# Patient Record
Sex: MALE | Race: BLACK OR AFRICAN AMERICAN | Employment: OTHER | URBAN - METROPOLITAN AREA
[De-identification: names, ages, dates, MRNs, and addresses within clinical notes are randomized per-mention and may not be internally consistent; named-entity substitution may affect disease eponyms.]

---

## 2017-03-28 ENCOUNTER — OFFICE VISIT (OUTPATIENT)
Dept: INTERNAL MEDICINE CLINIC | Age: 72
End: 2017-03-28

## 2017-03-28 VITALS
HEIGHT: 71 IN | HEART RATE: 72 BPM | DIASTOLIC BLOOD PRESSURE: 76 MMHG | OXYGEN SATURATION: 96 % | BODY MASS INDEX: 20.3 KG/M2 | SYSTOLIC BLOOD PRESSURE: 135 MMHG | TEMPERATURE: 98.1 F | WEIGHT: 145 LBS | RESPIRATION RATE: 18 BRPM

## 2017-03-28 DIAGNOSIS — Z01.818 PRE-OP EVALUATION: Primary | ICD-10-CM

## 2017-03-28 NOTE — MR AVS SNAPSHOT
Visit Information Date & Time Provider Department Dept. Phone Encounter #  
 3/28/2017 11:00 AM Erwin Mosquera, [address] [id number] Follow-up Instructions Return if symptoms worsen or fail to improve. Your Appointments 3/28/2017 11:00 AM  
PHYSICAL PRE OP with MD DAYTON [facility]) Appt Note: CPE/Pre-Op surg. Clearance//Cat. surg. 4/5/17 & 4/12/17//$15 CP-BAM-3/16/17  
 [facility] [place] [address] [address]  
[phone]  
  
   
 [place] [address]  [address] [address]  
  
    
 6/16/2017 11:45 AM  
ROUTINE CARE with MD DAYTON [facility]) Appt Note: 6 month follow up htn  
 [facility] [address] [address]  
[address]  [address] [facility] Upcoming Health Maintenance Date Due  
 GLAUCOMA SCREENING Q2Y 3/25/2009 COLONOSCOPY 9/13/2017 MEDICARE YEARLY EXAM 6/17/2017 DTaP/Tdap/Td series (2 - Td) 12/16/2026 Allergies as of 3/28/2017  Review Complete On: 3/28/2017 By: Esther Hong Severity Noted Reaction Type Reactions Lisinopril High 08/06/2012   Systemic Angioedema Entire face swells Current Immunizations  Reviewed on 11/16/2012 Name Date Influenza Vaccine 2/26/2016, 9/23/2014 Pneumococcal Polysaccharide (PPSV-23) 9/23/2014 Pneumococcal Vaccine (Unspecified Type) 10/6/2006 Not reviewed this visit You Were Diagnosed With   
  
 Codes Comments Pre-op evaluation    -  Primary ICD-10-CM: R91.280 ICD-9-CM: V72.84 Vitals BP Pulse Temp Resp Height(growth percentile) Weight(growth percentile) 135/76 (BP 1 Location: Left arm, BP Patient Position: Sitting) 72 98.1 °F (36.7 °C) (Oral) 18 5' 11\" (1.803 m) 145 lb (65.8 kg) SpO2 BMI Smoking Status 96% 20.22 kg/m2 Former Smoker BMI and BSA Data Body Mass Index Body Surface Area  
 20.22 kg/m 2 1.82 m 2 Preferred Pharmacy Pharmacy Name Phone [facility] [address] [place] [address] [address] [phone] Your Updated Medication List  
  
   
This list is accurate as of: 3/28/17 10:53 AM.  Always use your most recent med list.  
  
  
  
  
 albuterol 90 mcg/actuation inhaler Commonly known as:  PROVENTIL HFA, VENTOLIN HFA, PROAIR HFA Take  by inhalation. amLODIPine 10 mg tablet Commonly known as:  Tunica Citron TAKE 1 TABLET EVERY DAY  
  
 aspirin 81 mg chewable tablet Take 1 Tab by mouth daily. atorvastatin 10 mg tablet Commonly known as:  LIPITOR  
TAKE 1 TABLET  NIGHTLY CENTRUM SILVER Tab tablet Generic drug:  multivitamins-minerals-lutein Take 1 Tab by mouth daily. methocarbamol 750 mg tablet Commonly known as:  ROBAXIN Take 1 Tab by mouth four (4) times daily. raNITIdine 150 mg tablet Commonly known as:  ZANTAC Take 150 mg by mouth daily. Indications: GASTROESOPHAGEAL REFLUX  
  
 SYMBICORT 160-4.5 mcg/actuation HFA inhaler Generic drug:  budesonide-formoterol INHALE 1 PUFF TWICE DAILY  
  
 TYLENOL EXTRA STRENGTH 500 mg tablet Generic drug:  acetaminophen Take 1,000 mg by mouth every six (6) hours as needed. VITAMIN B-12 1,000 mcg tablet Generic drug:  cyanocobalamin Take 2,500 mcg by mouth daily. We Performed the Following AMB POC EKG ROUTINE W/ 12 LEADS, INTER & REP [17946 CPT(R)] Follow-up Instructions Return if symptoms worsen or fail to improve. Introducing [facility]! Dominique Fox introduces Sap[employer] patient portal. Now you can access parts of your medical record, email your doctor's office, and request medication refills online. 1. In your internet browser, go to [email or web address] 2. Click on the First Time User? Click Here link in the Sign In box.  You will see the New Member Sign Up page. 3. Enter your Tarena Access Code exactly as it appears below. You will not need to use this code after youve completed the sign-up process. If you do not sign up before the expiration date, you must request a new code. · Tarena Access Code: [email or web address] Expires: 6/26/2017 10:53 AM 
 
4. Enter the last four digits of your Social Security Number (xxxx) and Date of Birth (mm/dd/yyyy) as indicated and click Submit. You will be taken to the next sign-up page. 5. Create a Tarena ID. This will be your Tarena login ID and cannot be changed, so think of one that is secure and easy to remember. 6. Create a Tarena password. You can change your password at any time. 7. Enter your Password Reset Question and Answer. This can be used at a later time if you forget your password. 8. Enter your e-mail address. You will receive e-mail notification when new information is available in [address] 9. Click Sign Up. You can now view and download portions of your medical record. 10. Click the Download Summary menu link to download a portable copy of your medical information. If you have questions, please visit the Frequently Asked Questions section of the Tarena website. Remember, Tarena is NOT to be used for urgent needs. For medical emergencies, dial 911. Now available from your iPhone and Android! Please provide this summary of care documentation to your next provider. Your primary care clinician is listed as Jemal Parsons. If you have any questions after today's visit, please call [phone].

## 2017-03-28 NOTE — PROGRESS NOTES
Reviewed record in preparation for visit and have obtained necessary documentation. Identified pt with two pt identifiers(name and ). Chief Complaint   Patient presents with    Pre-op Exam     pt is here for a pre-op exam for cataract surgery on right eye schedule for 17       Health Maintenance Due   Topic Date Due    GLAUCOMA SCREENING Q2Y  2009    COLONOSCOPY  2017       Coordination of Care Questionnaire:  :     1. Have you been to the ER, urgent care clinic since your last visit? Hospitalized since your last visit?no    2. Have you seen or consulted any other health care providers outside of the [address] [place] [address] since your last visit? Include any pap smears or colon screening.  no

## 2017-03-28 NOTE — PROGRESS NOTES
SUBJECTIVE:   Boy Johnson is a 67 y.o. male who is here for a pre operative exam.     Surgery: cataract  Date of Surgery: right 4/5/17 and left 4/12/17  Surgeon: Mariola Vasquez D.O.    Pt's BP is well controlled at 135/76 today. Pt states he has stomach testing that did not have abnormal results. Pt specifically denies cp or SOB. At this time, she is otherwise doing well and has brought no other complaints to my attention today. For a list of the medical issues addressed today, see the assessment and plan below. PMH:   Past Medical History:   Diagnosis Date    Cancer [facility]) 2012    right lung SCC stage I    COPD     SIRS    Dyspepsia and other specified disorders of function of stomach     Essential hypertension     GERD (gastroesophageal reflux disease)     HTN (hypertension) 8/4/2012    Hypercholesterolemia     Hyperlipidemia     Prediabetes 8/26/2015    Pseudogout 8/6/2012    Squamous cell carcinoma lung ([facility] [address] 09/26/2012       PSH:  has a past surgical history that includes chest surgery procedure unlisted; other surgical (10 26 12); other surgical (11/13/12); hernia repair (2011); and hernia repair (5/20/13). Allergies: is allergic to lisinopril. Meds:   Current Outpatient Prescriptions   Medication Sig    atorvastatin (LIPITOR) 10 mg tablet TAKE 1 TABLET  NIGHTLY    amLODIPine (NORVASC) 10 mg tablet TAKE 1 TABLET EVERY DAY    SYMBICORT 160-4.5 mcg/actuation HFA inhaler INHALE 1 PUFF TWICE DAILY    cyanocobalamin (VITAMIN B-12) 1,000 mcg tablet Take 2,500 mcg by mouth daily.  ranitidine (ZANTAC) 150 mg tablet Take 150 mg by mouth daily. Indications: GASTROESOPHAGEAL REFLUX    multivitamins-minerals-lutein (CENTRUM SILVER) Tab Take 1 Tab by mouth daily.  acetaminophen (TYLENOL EXTRA STRENGTH) 500 mg tablet Take 1,000 mg by mouth every six (6) hours as needed.  aspirin 81 mg chewable tablet Take 1 Tab by mouth daily.     methocarbamol (ROBAXIN) 750 mg tablet Take 1 Tab by mouth four (4) times daily.  albuterol (PROVENTIL HFA, VENTOLIN HFA) 90 mcg/actuation inhaler Take  by inhalation. No current facility-administered medications for this visit. Fam hx: family history is not on file. Soc hx:  reports that he quit smoking about 4 years ago. He has a 67.50 pack-year smoking history. He has never used smokeless tobacco. He reports that he does not drink alcohol or use illicit drugs. Review of Systems - History obtained from the patient  General ROS: negative  Psychological ROS: negative  Ophthalmic ROS: negative  ENT ROS: negative  Respiratory ROS: no cough, shortness of breath, or wheezing  Cardiovascular ROS: no chest pain or dyspnea on exertion  Gastrointestinal ROS: no abdominal pain, change in bowel habits, or black or bloody stools  Genito-Urinary ROS: negative  Musculoskeletal ROS: negative  Neurological ROS: negative  Dermatological ROS: negative    OBJECTIVE:   Vitals:   Visit Vitals    /76 (BP 1 Location: Left arm, BP Patient Position: Sitting)    Pulse 72    Temp 98.1 °F (36.7 °C) (Oral)    Resp 18    Ht 5' 11\" (1.803 m)    Wt 145 lb (65.8 kg)    SpO2 96%    BMI 20.22 kg/m2     Gen: Pleasant 67 y.o. male in NAD. HEENT: PERRLA. EOMI. OP moist and pink. EARS: TMs normal and canals equal bilaterally. NECK: Supple. No LAD. No thyromegaly. HEART: RRR, No M/G/R.   LUNGS: CTAB No W/R. ABDOMEN: S, NT, ND, BS+. EXTREMITIES: Warm. No C/C/E.  MUSCULOSKELETAL: Normal ROM, muscle strength 5/5 all groups. NEURO: Alert and oriented x 3. Cranial nerves grossly intact. No focal sensory or motor deficits noted. SKIN: Warm. Dry. No rashes or other lesions noted. ASSESSMENT/ PLAN:     Ashley Mayes was seen today for pre-op exam.    Diagnoses and all orders for this visit:    Pre-op evaluation  -     AMB POC EKG ROUTINE W/ 12 LEADS, INTER & REP      Ashley Hollis's pre operative exam was normal. I cleared the pt for surgery scheduled for 4/5/17. I will fax over the appropriate paperwork to the surgeon. The pt will follow up PRN. Follow-up Disposition:  Return if symptoms worsen or fail to improve. I have reviewed the patient's medications and risks/side effects/benefits were discussed. Diagnosis(-es) explained to patient and questions answered. Literature provided where appropriate.      Written by Emir Rodriguez, as dictated by Cari Waters MD.

## 2017-04-03 RX ORDER — MOXIFLOXACIN 5 MG/ML
1 SOLUTION/ DROPS OPHTHALMIC 4 TIMES DAILY
COMMUNITY
End: 2017-06-16 | Stop reason: ALTCHOICE

## 2017-04-03 NOTE — PERIOP NOTES
[facility]  Ambulatory Surgery Unit  Pre-operative Instructions    Surgery/Procedure Date  4/5/17            Tentative Arrival Time TBA      1. On the day of your surgery/procedure, please report to the Ambulatory Surgery Unit Registration Desk and sign in at your designated time. The Ambulatory Surgery Unit is located in [facility] on the [facility] side of the [facility] across from the [facility] [place]. Please have all of your health insurance cards and a photo ID. 2. You must have someone with you to drive you home, as you should not drive a car for 24 hours following anesthesia. Please make arrangements for a responsible adult friend or family member to stay with you for at least the first 24 hours after your surgery. 3. Do not have anything to eat or drink (including water, gum, mints, coffee, juice) after midnight   4/4/17. This may not apply to medications prescribed by your physician. (Please note below the special instructions with medications to take the morning of surgery, if applicable.)    4. We recommend you do not drink any alcoholic beverages for 24 hours before and after your surgery. 5. Stop all Aspirin, non-steroidal anti-inflammatory drugs (i.e. Advil, Aleve), vitamins, and supplements as directed by your surgeon's office. **If you are currently taking Plavix, Coumadin, or other blood-thinning agents, contact your surgeon for instructions. **    6. In an effort to help prevent surgical site infection, we ask that you shower with an anti-bacterial soap (i.e. Dial or Safeguard) on the morning of surgery. Do not apply any lotions, powders, or deodorants after the shower on the day of your procedure. If applicable, please do not shave the operative site for 48 hours prior to surgery. 7. Wear comfortable clothes. Wear glasses instead of contacts. Do not bring any jewelry or money (other than copays or fees as instructed).  Do not wear make-up, particularly master, the morning of your surgery. Do not wear nail polish, particularly if you are having foot /hand surgery. Wear your hair loose or down, no ponytails, buns, martin pins or clips. All body piercings must be removed. 8. You should understand that if you do not follow these instructions your surgery may be cancelled. If your physical condition changes (i.e. fever, cold or flu) please contact your surgeon as soon as possible. 9. It is important that you be on time. If a situation occurs where you may be late, or if you have any questions or problems, please call [phone].    10. Your surgery time may be subject to change. You will receive a phone call the day prior to surgery to confirm your arrival time. 11. Pediatric patients: please bring a change of clothes, diapers, bottle/sippy cup, pacifier, etc.      Special Instructions:    MEDICATIONS TO TAKE THE MORNING OF SURGERY WITH A SIP OF WATER: amlodipine, inhaler, vigamox eye drops as instructed      I understand a pre-operative phone call will be made to verify my surgery time. In the event that I am not available, I give permission for a message to be left on my answering service and/or with another person?       Yes     (instructions given verbally during phone assessment- pt voiced understanding)     ___________________      ___________________      ________________  (Signature of Patient)          (Witness)                   (Date and Time)

## 2017-04-04 ENCOUNTER — ANESTHESIA EVENT (OUTPATIENT)
Dept: SURGERY | Age: 72
End: 2017-04-04
Payer: COMMERCIAL

## 2017-04-04 PROBLEM — H25.811 COMBINED FORMS OF AGE-RELATED CATARACT OF RIGHT EYE: Status: ACTIVE | Noted: 2017-04-04

## 2017-04-05 ENCOUNTER — ANESTHESIA (OUTPATIENT)
Dept: SURGERY | Age: 72
End: 2017-04-05
Payer: COMMERCIAL

## 2017-04-05 ENCOUNTER — SURGERY (OUTPATIENT)
Age: 72
End: 2017-04-05

## 2017-04-05 ENCOUNTER — HOSPITAL ENCOUNTER (OUTPATIENT)
Age: 72
Setting detail: OUTPATIENT SURGERY
Discharge: HOME | End: 2017-04-05
Attending: OPHTHALMOLOGY | Admitting: OPHTHALMOLOGY
Payer: COMMERCIAL

## 2017-04-05 VITALS
OXYGEN SATURATION: 95 % | DIASTOLIC BLOOD PRESSURE: 73 MMHG | WEIGHT: 146.4 LBS | BODY MASS INDEX: 20.5 KG/M2 | TEMPERATURE: 97.7 F | HEIGHT: 71 IN | RESPIRATION RATE: 20 BRPM | SYSTOLIC BLOOD PRESSURE: 118 MMHG | HEART RATE: 71 BPM

## 2017-04-05 PROCEDURE — 76210000046 HC AMBSU PH II REC FIRST 0.5 HR: Performed by: OPHTHALMOLOGY

## 2017-04-05 PROCEDURE — 74011250636 HC RX REV CODE- 250/636: Performed by: OPHTHALMOLOGY

## 2017-04-05 PROCEDURE — 74011250636 HC RX REV CODE- 250/636

## 2017-04-05 PROCEDURE — V2632 POST CHMBR INTRAOCULAR LENS: HCPCS | Performed by: OPHTHALMOLOGY

## 2017-04-05 PROCEDURE — 77030013854 HC PK PHACO KT ALCN -C: Performed by: OPHTHALMOLOGY

## 2017-04-05 PROCEDURE — 77030018846 HC SOL IRR STRL H20 ICUM -A: Performed by: OPHTHALMOLOGY

## 2017-04-05 PROCEDURE — 74011000250 HC RX REV CODE- 250: Performed by: OPHTHALMOLOGY

## 2017-04-05 PROCEDURE — 76030000002 HC AMB SURG OR TIME FIRST 0.: Performed by: OPHTHALMOLOGY

## 2017-04-05 PROCEDURE — 76060000073 HC AMB SURG ANES FIRST 0.5 HR: Performed by: OPHTHALMOLOGY

## 2017-04-05 DEVICE — LENS IOL POST 1-PC 6X13 25.5 -- ACRYSOF: Type: IMPLANTABLE DEVICE | Site: EYE | Status: FUNCTIONAL

## 2017-04-05 RX ORDER — ONDANSETRON 2 MG/ML
4 INJECTION INTRAMUSCULAR; INTRAVENOUS AS NEEDED
Status: DISCONTINUED | OUTPATIENT
Start: 2017-04-05 | End: 2017-04-05 | Stop reason: HOSPADM

## 2017-04-05 RX ORDER — SODIUM CHLORIDE 0.9 % (FLUSH) 0.9 %
5-10 SYRINGE (ML) INJECTION AS NEEDED
Status: DISCONTINUED | OUTPATIENT
Start: 2017-04-05 | End: 2017-04-05 | Stop reason: HOSPADM

## 2017-04-05 RX ORDER — LIDOCAINE HYDROCHLORIDE 10 MG/ML
0.1 INJECTION, SOLUTION EPIDURAL; INFILTRATION; INTRACAUDAL; PERINEURAL AS NEEDED
Status: DISCONTINUED | OUTPATIENT
Start: 2017-04-05 | End: 2017-04-05 | Stop reason: HOSPADM

## 2017-04-05 RX ORDER — SODIUM CHLORIDE, SODIUM LACTATE, POTASSIUM CHLORIDE, CALCIUM CHLORIDE 600; 310; 30; 20 MG/100ML; MG/100ML; MG/100ML; MG/100ML
25 INJECTION, SOLUTION INTRAVENOUS CONTINUOUS
Status: DISCONTINUED | OUTPATIENT
Start: 2017-04-05 | End: 2017-04-05 | Stop reason: HOSPADM

## 2017-04-05 RX ORDER — SODIUM CHLORIDE 9 MG/ML
25 INJECTION, SOLUTION INTRAVENOUS CONTINUOUS
Status: DISCONTINUED | OUTPATIENT
Start: 2017-04-05 | End: 2017-04-05 | Stop reason: HOSPADM

## 2017-04-05 RX ORDER — GENTAMICIN SULFATE 0.3 %
0.5 OINTMENT (GRAM) OPHTHALMIC (EYE) 3 TIMES DAILY
Status: DISCONTINUED | OUTPATIENT
Start: 2017-04-05 | End: 2017-04-05 | Stop reason: HOSPADM

## 2017-04-05 RX ORDER — CYCLOPENTOLATE HYDROCHLORIDE 20 MG/ML
1 SOLUTION/ DROPS OPHTHALMIC
Status: COMPLETED | OUTPATIENT
Start: 2017-04-05 | End: 2017-04-05

## 2017-04-05 RX ORDER — PHENYLEPHRINE HYDROCHLORIDE 25 MG/ML
1 SOLUTION/ DROPS OPHTHALMIC
Status: COMPLETED | OUTPATIENT
Start: 2017-04-05 | End: 2017-04-05

## 2017-04-05 RX ORDER — MIDAZOLAM HYDROCHLORIDE 1 MG/ML
INJECTION, SOLUTION INTRAMUSCULAR; INTRAVENOUS AS NEEDED
Status: DISCONTINUED | OUTPATIENT
Start: 2017-04-05 | End: 2017-04-05 | Stop reason: HOSPADM

## 2017-04-05 RX ORDER — PROPARACAINE HYDROCHLORIDE 5 MG/ML
1 SOLUTION/ DROPS OPHTHALMIC
Status: COMPLETED | OUTPATIENT
Start: 2017-04-05 | End: 2017-04-05

## 2017-04-05 RX ORDER — FENTANYL CITRATE 50 UG/ML
25 INJECTION, SOLUTION INTRAMUSCULAR; INTRAVENOUS
Status: DISCONTINUED | OUTPATIENT
Start: 2017-04-05 | End: 2017-04-05 | Stop reason: HOSPADM

## 2017-04-05 RX ORDER — SODIUM CHLORIDE 0.9 % (FLUSH) 0.9 %
5-10 SYRINGE (ML) INJECTION EVERY 8 HOURS
Status: DISCONTINUED | OUTPATIENT
Start: 2017-04-05 | End: 2017-04-05 | Stop reason: HOSPADM

## 2017-04-05 RX ORDER — LIDOCAINE HYDROCHLORIDE 40 MG/ML
3 INJECTION, SOLUTION RETROBULBAR; TOPICAL ONCE
Status: COMPLETED | OUTPATIENT
Start: 2017-04-05 | End: 2017-04-05

## 2017-04-05 RX ORDER — DICLOFENAC SODIUM 1 MG/ML
1 SOLUTION/ DROPS OPHTHALMIC
Status: COMPLETED | OUTPATIENT
Start: 2017-04-05 | End: 2017-04-05

## 2017-04-05 RX ORDER — DIPHENHYDRAMINE HYDROCHLORIDE 50 MG/ML
12.5 INJECTION, SOLUTION INTRAMUSCULAR; INTRAVENOUS AS NEEDED
Status: DISCONTINUED | OUTPATIENT
Start: 2017-04-05 | End: 2017-04-05 | Stop reason: HOSPADM

## 2017-04-05 RX ORDER — PREDNISOLONE ACETATE 10 MG/ML
1 SUSPENSION/ DROPS OPHTHALMIC 2 TIMES DAILY
Status: DISCONTINUED | OUTPATIENT
Start: 2017-04-05 | End: 2017-04-05 | Stop reason: HOSPADM

## 2017-04-05 RX ORDER — LIDOCAINE HYDROCHLORIDE 10 MG/ML
0.5 INJECTION, SOLUTION EPIDURAL; INFILTRATION; INTRACAUDAL; PERINEURAL ONCE
Status: COMPLETED | OUTPATIENT
Start: 2017-04-05 | End: 2017-04-05

## 2017-04-05 RX ADMIN — LIDOCAINE HYDROCHLORIDE 0.5 ML: 10 INJECTION, SOLUTION EPIDURAL; INFILTRATION; INTRACAUDAL; PERINEURAL at 08:36

## 2017-04-05 RX ADMIN — PROPARACAINE HYDROCHLORIDE 1 DROP: 5 SOLUTION/ DROPS OPHTHALMIC at 08:06

## 2017-04-05 RX ADMIN — CYCLOPENTOLATE HYDROCHLORIDE 1 DROP: 20 SOLUTION/ DROPS OPHTHALMIC at 07:53

## 2017-04-05 RX ADMIN — CYCLOPENTOLATE HYDROCHLORIDE 1 DROP: 20 SOLUTION/ DROPS OPHTHALMIC at 07:48

## 2017-04-05 RX ADMIN — PROPARACAINE HYDROCHLORIDE 1 DROP: 5 SOLUTION/ DROPS OPHTHALMIC at 07:48

## 2017-04-05 RX ADMIN — DICLOFENAC SODIUM 1 DROP: 1 SOLUTION/ DROPS OPHTHALMIC at 07:48

## 2017-04-05 RX ADMIN — PHENYLEPHRINE HYDROCHLORIDE 1 DROP: 25 SOLUTION/ DROPS OPHTHALMIC at 07:48

## 2017-04-05 RX ADMIN — ACETYLCHOLINE CHLORIDE 10 MG: KIT at 08:36

## 2017-04-05 RX ADMIN — PROPARACAINE HYDROCHLORIDE 1 DROP: 5 SOLUTION/ DROPS OPHTHALMIC at 08:04

## 2017-04-05 RX ADMIN — MIDAZOLAM HYDROCHLORIDE 1 MG: 1 INJECTION, SOLUTION INTRAMUSCULAR; INTRAVENOUS at 08:37

## 2017-04-05 RX ADMIN — PREDNISOLONE ACETATE 1 DROP: 10 SUSPENSION/ DROPS OPHTHALMIC at 08:37

## 2017-04-05 RX ADMIN — LIDOCAINE HYDROCHLORIDE 3 ML: 40 INJECTION, SOLUTION RETROBULBAR; TOPICAL at 08:36

## 2017-04-05 RX ADMIN — SODIUM HYALURONATE 0.85 ML: 10 INJECTION INTRAOCULAR at 08:37

## 2017-04-05 RX ADMIN — PHENYLEPHRINE HYDROCHLORIDE 1 DROP: 25 SOLUTION/ DROPS OPHTHALMIC at 07:53

## 2017-04-05 RX ADMIN — DICLOFENAC SODIUM 1 DROP: 1 SOLUTION/ DROPS OPHTHALMIC at 08:05

## 2017-04-05 RX ADMIN — CYCLOPENTOLATE HYDROCHLORIDE 1 DROP: 20 SOLUTION/ DROPS OPHTHALMIC at 08:05

## 2017-04-05 RX ADMIN — APRACLONIDINE HYDROCHLORIDE 1 DROP: 10 SOLUTION/ DROPS OPHTHALMIC at 08:36

## 2017-04-05 RX ADMIN — GENTAMICIN SULFATE 0.5 INCH: 3 OINTMENT OPHTHALMIC at 08:36

## 2017-04-05 RX ADMIN — PROPARACAINE HYDROCHLORIDE 1 DROP: 5 SOLUTION/ DROPS OPHTHALMIC at 07:53

## 2017-04-05 RX ADMIN — Medication 1 ML: at 08:36

## 2017-04-05 RX ADMIN — SODIUM CHLORIDE 25 ML/HR: 900 INJECTION, SOLUTION INTRAVENOUS at 07:47

## 2017-04-05 RX ADMIN — VANCOMYCIN HYDROCHLORIDE: 1 INJECTION, POWDER, LYOPHILIZED, FOR SOLUTION INTRAVENOUS at 08:37

## 2017-04-05 RX ADMIN — PHENYLEPHRINE HYDROCHLORIDE 1 DROP: 25 SOLUTION/ DROPS OPHTHALMIC at 07:59

## 2017-04-05 RX ADMIN — PROPARACAINE HYDROCHLORIDE 1 DROP: 5 SOLUTION/ DROPS OPHTHALMIC at 07:59

## 2017-04-05 RX ADMIN — CYCLOPENTOLATE HYDROCHLORIDE 1 DROP: 20 SOLUTION/ DROPS OPHTHALMIC at 07:59

## 2017-04-05 RX ADMIN — BALANCED SALT SOLUTION 1 BOTTLE: 6.4; .75; .48; .3; 3.9; 1.7 SOLUTION OPHTHALMIC at 08:36

## 2017-04-05 RX ADMIN — PHENYLEPHRINE HYDROCHLORIDE 1 DROP: 25 SOLUTION/ DROPS OPHTHALMIC at 08:05

## 2017-04-05 RX ADMIN — DICLOFENAC SODIUM 1 DROP: 1 SOLUTION/ DROPS OPHTHALMIC at 07:53

## 2017-04-05 RX ADMIN — DICLOFENAC SODIUM 1 DROP: 1 SOLUTION/ DROPS OPHTHALMIC at 07:59

## 2017-04-05 NOTE — OP NOTES
Name: Martine Allen MASC-4        updated 3/1/2013  Description:  Dianna Garcia with intraocular lens implant    PREOPERATIVE DIAGNOSIS: Visually impairing combined cataract, Right eye. POSTOPERATIVE DIAGNOSIS: Visually impairing combined   cataract,Right eye. OPERATION: Procedure(s):  CATARACT EXTRACTION WITH INTRA OCULAR LENS IMPLANT RIGHT EYE    ANESTHESIA: Topical with intravenous sedation    TYPE OF LENS IMPLANT USED:   Implant Name Type Inv. Item Serial No.  Lot No. LRB No. Used Action   LENS IOL POST 1-PC 6X13 25.5 -- [employer] - [id number]   LENS IOL POST 1-PC 6X13 25.5 -- [employer] [id number] [employer] NA Right 1 Implanted       PHACO TIME:   77 seconds at an average power of 21%. Estimated blood loss: None    Complications:None    Specimen removed: None    DESCRIPTION OF PROCEDURE:  The patient was brought to the holding area, where an IV was begun at the keep open rate. The patient received several instillations of David-Synephrine 2.5%, Cyclogyl 2%, and tetracaine 0.5% until adequate pupillary dilatation was achieved. The patient was connected to cardiovascular monitoring. Prior to being brought back to the operating suite, the patient received 2 drops of Betadine 5% solution into the inferior cul-de-sac of the operated eye. The patient was then brought back to the operating suite, and prepped and draped in the usual sterile manner after being re-connnected to cardiovascular monitoring. One drop of 4% Xylocaine was then instilled onto the eye. The lid speculum was set into position and the operating microscope was focused on the patient. Two drops of 4% Xylocaine were again instilled onto the eye. Using the fixation ring, the sharp 15-degree blade was used to create a paracentesis site and 1% MPF Xylocaine was instilled into the anterior chamber for anesthesia. Viscoelastic was then instilled into the anterior chamber.  The 2.4 mm keratome was then utilized to create a clear corneal incision, and a circular capsulorrhexis was performed. BSS was utilized to perform careful hydrodissection of the lens. Viscoelastic was then instilled into the anterior chamber to protect the corneal endothelium. Phacoemulsification was then carried out. Any remaining cortical material was removed in irrigation/aspiration mode. Viscoelastic was then instilled into the capsular bag. The lens implant was inspected for any defects. After finding none, the lens was placed in its injector and inserted into the capsular bag. The lens implant was centered on the patient's visual/astigmatic axis. The viscoelastic was then removed from the eye. Miochol was instilled posterior to the iris plane to facilitate pupillary miosis. The wound was checked for any leaks, after finding none, Iopidine and Pred Forte drops were instilled into the inferior cul-de-sac, and gentamicin ointment was placed over the cornea. A semi-pressure dressing and shield were then placed for the patient. The patient tolerated the procedure very well, and was brought to the recovery room in an alert and stable and satisfactory postoperative condition. Instructions were given to the patient and their family for their immediate postoperative care.   [address] [place] [address]  4/5/2017

## 2017-04-05 NOTE — DISCHARGE INSTRUCTIONS
Xenia Hampton D.O., P.CMiya  [facility].  [phone]    Post-Operative Instructions for Cataract Surgery     Remove your eye shield and bandage at 12 noon the same day as surgery and start your eye drops. THROW AWAY THE GAUZE UNDER THE SHIELD.  Place the blue eye shield back on for one week while asleep, even if napping in the recliner. Use the tape included in your bag.  DO NOT RUB YOUR EYE EVER! DO NOT WIPE YOUR EYE! ONLY WIPE ON YOUR CHEEK!                DO NOT WEAR EYE MAKEUP FOR 1 WEEK!  You may take a bath today and you can shower starting tomorrow.  You may resume your previous diet.  If you use glaucoma drops in the operative eye, continue them as directed.  Common symptoms after surgery include a scratchy feeling, slight headache, red eye, and/or blurred vision. You may use Advil or Tylenol for any discomfort.  Avoid strenuous activities and driving until you see Dr. Sylwia Matias tomorrow. Please use care when walking, your depth perception may be altered.  Bring your bag with your drops to your follow up appointment. Below are Instructions On How To Use Your Eye Drops. ON THE DAY OF SURGERY:     Use all three eye drops at 12 noon, 4pm, and 8pm.  Wait 10 minutes between drops. THE DAY AFTER SURGERY:     You will use the drops 4 times a day at 8am, 12 noon, 4pm, and 8pm.   Use the drops every day until bottles are empty. Vigamox (tan top) Put 1 drop in at 8am, 12 noon, 4pm, and 8pm.    Pred Acetate (pink top) Put 1 drop in at 8:10am, 12:10pm, 4:10pm, and 8:10pm. Shake before using. Diclofenac Put 1 drop in at 8:20am, 12:20pm, 4:20pm, 8:20pm.    CONTINUE DROPS UNTIL ALL BOTTLES ARE EMPTY! Follow-up appointment tomorrow at Dr. Herb John office:___845 am_________________    Please call the office at [phone] if you experience severe pain or nausea.        DISCHARGE SUMMARY from Nurse    The following personal items collected during your admission are returned to you:   Dental Appliance: Dental Appliances: Lowers, Partials, Uppers, With patient  Vision: Visual Aid: Glasses, With patient  Hearing Aid:    Jewelry:    Clothing:    Other Valuables:    Valuables sent to safe:      PATIENT INSTRUCTIONS:    After general anesthesia or intravenous sedation, for 24 hours or while taking prescription arcotics:  · Someone should be with you for the next 24 hours. · For your own safety, a responsible adult must drive you home. · Limit your activities  · Recommended activity: Rest today and no driving until after your appointment with Dr. Jason Terrell tomorrow. · Do not drive and operate hazardous machinery  · Do not make important personal or business decisions  · Do  not drink alcoholic beverages  · If you have not urinated within 8 hours after discharge, please contact your surgeon on call. Report the following to your surgeon:  · Excessive pain, swelling, redness or odor of or around the surgical area  · Temperature over 100.5  · Any nausea and vomiting   ·   · You will receive a Post Operative Call from one of the Recovery Room Nurses on the day after your surgery to check on you. It is very important for us to know how you are recovering after your surgery. · You may receive an e-mail or letter in the mail from Fernando Valdes regarding your experience with us in the Ambulatory Surgery Unit. Your feedback is valuable to us and we appreciate your participation in the survey. ·   ·     · If the above instructions are not adequate, please contact Kayleigh Silva RN, [facility] anesthesia Nurse Manager or our Anesthesiologist, at [phone]. ·   · We wish you a speedy recovery ? What to do at Home:    *  Please give a list of your current medications to your Primary Care Provider.     *  Please update this list whenever your medications are discontinued, doses are      changed, or new medications (including over-the-counter products) are added. *  Please carry medication information at all times in case of emergency situations. These are general instructions for a healthy lifestyle:    No smoking/ No tobacco products/ Avoid exposure to second hand smoke    Surgeon General's Warning:  Quitting smoking now greatly reduces serious risk to your health. Obesity, smoking, and sedentary lifestyle greatly increases your risk for illness    A healthy diet, regular physical exercise & weight monitoring are important for maintaining a healthy lifestyle    You may be retaining fluid if you have a history of heart failure or if you experience any of the following symptoms:  Weight gain of 3 pounds or more overnight or 5 pounds in a week, increased swelling in our hands or feet or shortness of breath while lying flat in bed. Please call your doctor as soon as you notice any of these symptoms; do not wait until your next office visit. Recognize signs and symptoms of STROKE:    F-face looks uneven    A-arms unable to move or move even    S-speech slurred or non-existent    T-time-call 911 as soon as signs and symptoms begin-DO NOT go       Back to bed or wait to see if you get better-TIME IS BRAIN. If you have not had your influenza or pneumococcal vaccines, please follow up with your primary care physician. The discharge information has been reviewed with the patient and caregiver. The patient and caregiver verbalized understanding.

## 2017-04-05 NOTE — ANESTHESIA PREPROCEDURE EVALUATION
Anesthetic History   No history of anesthetic complications            Review of Systems / Medical History  Patient summary reviewed, nursing notes reviewed and pertinent labs reviewed    Pulmonary    COPD               Neuro/Psych   Within defined limits           Cardiovascular    Hypertension          Hyperlipidemia    Exercise tolerance: >4 METS     GI/Hepatic/Renal     GERD: well controlled           Endo/Other        Cancer (lung, 2012)     Other Findings   Comments: Prediabetes  pseudogout           Physical Exam    Airway  Mallampati: I  TM Distance: 4 - 6 cm  Neck ROM: decreased range of motion   Mouth opening: Normal     Cardiovascular    Rhythm: regular  Rate: normal      Pertinent negatives: No murmur   Dental    Dentition: Full upper dentures and Lower partial plate     Pulmonary  Breath sounds clear to auscultation               Abdominal  GI exam deferred       Other Findings            Anesthetic Plan    ASA: 2  Anesthesia type: MAC          Induction: Intravenous  Anesthetic plan and risks discussed with: Patient

## 2017-04-05 NOTE — IP AVS SNAPSHOT
[address] [facility] 
[phone] Patient: Cecilio Fletcher MRN: [id number] UC You are allergic to the following Allergen Reactions Lisinopril Angioedema Entire face swells Recent Documentation Height Weight BMI Smoking Status 1.803 m 65.8 kg 20.22 kg/m2 Former Smoker Emergency Contacts Name Discharge Info Relation Home Work Mobile [address]  Spouse [3] [phone] HollisIrma edgarn DISCHARGE CAREGIVER [3] Child [2] [phone] About your hospitalization You were admitted on:  2017 You last received care in the:  [facility] You were discharged on:  2017 Unit phone number:  [phone] Why you were hospitalized Your primary diagnosis was:  Combined Forms Of Age-Related Cataract Of Right Eye  
  
  
 
  
  
Providers Seen During Your Hospitalizations Provider Role Specialty Primary office phone Bay Brand DO Attending Provider Ophthalmology [phone] Your Primary Care Physician (PCP) Primary Care Physician Office Phone Office Fax [facility] [phone] Follow-up Information Follow up With Details Comments Contact Info Frankey Level, MD   [address] [place] [address] [address] [facility] 
[phone] Your Appointments 2017 CATARACT EXTRACTION WITH INTRA OCULAR LENS IMPLANT with Bay Brand DO  
[facility] AMB SURGERY UNIT (RI OR PRE ASSESSMENT) [address] [facility]  
[phone] Current Discharge Medication List  
  
ASK your doctor about these medications Dose & Instructions Dispensing Information Comments Morning Noon Evening Bedtime  
 albuterol 90 mcg/actuation inhaler Commonly known as:  PROVENTIL HFA, VENTOLIN HFA, PROAIR HFA  
   
 Your last dose was: Your next dose is:    
   
   
 Dose:  1 Puff Take 1 Puff by inhalation every four (4) hours as needed. Refills:  0  
     
   
   
   
  
 amLODIPine 10 mg tablet Commonly known as:  Joe Ramirez Your last dose was: Your next dose is: TAKE 1 TABLET EVERY DAY Quantity:  90 Tab Refills:  1  
     
   
   
   
  
 aspirin 81 mg chewable tablet Your last dose was: Your next dose is:    
   
   
 Dose:  81 mg Take 1 Tab by mouth daily. Quantity:  30 Tab Refills:  1  
     
   
   
   
  
 atorvastatin 10 mg tablet Commonly known as:  LIPITOR Your last dose was: Your next dose is: TAKE 1 TABLET  NIGHTLY Quantity:  90 Tab Refills:  3 CENTRUM SILVER Tab tablet Generic drug:  multivitamins-minerals-lutein Your last dose was: Your next dose is:    
   
   
 Dose:  1 Tab Take 1 Tab by mouth daily. Refills:  0  
     
   
   
   
  
 raNITIdine 150 mg tablet Commonly known as:  ZANTAC Your last dose was: Your next dose is:    
   
   
 Dose:  150 mg Take 150 mg by mouth daily. Indications: GASTROESOPHAGEAL REFLUX Refills:  0  
     
   
   
   
  
 SYMBICORT 160-4.5 mcg/actuation HFA inhaler Generic drug:  budesonide-formoterol Your last dose was: Your next dose is:    
   
   
 INHALE 1 PUFF TWICE DAILY Quantity:  2 Inhaler Refills:  5  
     
   
   
   
  
 TYLENOL EXTRA STRENGTH 500 mg tablet Generic drug:  acetaminophen Your last dose was: Your next dose is:    
   
   
 Dose:  1000 mg Take 1,000 mg by mouth every six (6) hours as needed. Refills:  0  
     
   
   
   
  
 VIGAMOX 0.5 % ophthalmic solution Generic drug:  moxifloxacin Your last dose was: Your next dose is:    
   
   
 Dose:  1 Drop Administer 1 Drop to right eye four (4) times daily. Refills:  0 VITAMIN B-12 1,000 mcg tablet Generic drug:  cyanocobalamin Your last dose was: Your next dose is:    
   
   
 Dose:  2500 mcg Take 2,500 mcg by mouth daily. Refills:  0 Discharge Instructions Erna Clifford D.O., P.CMiya 
[facility]. 
[phone] Post-Operative Instructions for Cataract Surgery ? Remove your eye shield and bandage at 12 noon the same day as surgery and start your eye drops. THROW AWAY THE GAUZE UNDER THE SHIELD. ? Place the blue eye shield back on for one week while asleep, even if napping in the recliner. Use the tape included in your bag.   
       
 
? DO NOT RUB YOUR EYE EVER! DO NOT WIPE YOUR EYE! ONLY WIPE ON YOUR CHEEK! 
 
            DO NOT WEAR EYE MAKEUP FOR 1 WEEK! 
 
 
? You may take a bath today and you can shower starting tomorrow. ? You may resume your previous diet. ? If you use glaucoma drops in the operative eye, continue them as directed. ? Common symptoms after surgery include a scratchy feeling, slight headache, red eye, and/or blurred vision. You may use Advil or Tylenol for any discomfort. ? Avoid strenuous activities and driving until you see Dr. Jocy Loredo tomorrow. Please use care when walking, your depth perception may be altered. ? Bring your bag with your drops to your follow up appointment. Below are Instructions On How To Use Your Eye Drops. ON THE DAY OF SURGERY: 
 
? Use all three eye drops at 12 noon, 4pm, and 8pm.  Wait 10 minutes between drops. THE DAY AFTER SURGERY: 
 
? You will use the drops 4 times a day at 8am, 12 noon, 4pm, and 8pm. 
? Use the drops every day until bottles are empty. Vigamox (tan top) Put 1 drop in at 8am, 12 noon, 4pm, and 8pm. 
 
Pred Acetate (pink top) Put 1 drop in at 8:10am, 12:10pm, 4:10pm, and 8:10pm. Shake before using.  
 
Diclofenac Put 1 drop in at 8:20am, 12:20pm, 4:20pm, 8:20pm. 
 
 CONTINUE DROPS UNTIL ALL BOTTLES ARE EMPTY! Follow-up appointment tomorrow at Dr. Atwood office:___845 am_________________ Please call the office at [phone] if you experience severe pain or nausea. DISCHARGE SUMMARY from Nurse The following personal items collected during your admission are returned to you:  
Dental Appliance: Dental Appliances: Lowers, Partials, Uppers, With patient Vision: Visual Aid: Glasses, With patient Hearing Aid:   
Jewelry:   
Clothing:   
Other Valuables:   
Valuables sent to safe:   
 
PATIENT INSTRUCTIONS: 
 
After general anesthesia or intravenous sedation, for 24 hours or while taking prescription arcotics: · Someone should be with you for the next 24 hours. · For your own safety, a responsible adult must drive you home. · Limit your activities · Recommended activity: Rest today and no driving until after your appointment with Dr. Jocy Loredo tomorrow. · Do not drive and operate hazardous machinery · Do not make important personal or business decisions · Do  not drink alcoholic beverages · If you have not urinated within 8 hours after discharge, please contact your surgeon on call. Report the following to your surgeon: 
· Excessive pain, swelling, redness or odor of or around the surgical area · Temperature over 100.5 · Any nausea and vomiting ·  
· You will receive a Post Operative Call from one of the Recovery Room Nurses on the day after your surgery to check on you. It is very important for us to know how you are recovering after your surgery. · You may receive an e-mail or letter in the mail from [place] regarding your experience with us in the Ambulatory Surgery Unit. Your feedback is valuable to us and we appreciate your participation in the survey. ·  
·  
 
· If the above instructions are not adequate, please contact Rudy Verduzco RN, [facility] anesthesia Nurse Manager or our Anesthesiologist, at [phone].  
·  
 · We wish you a speedy recovery ? What to do at Home: *  Please give a list of your current medications to your Primary Care Provider. *  Please update this list whenever your medications are discontinued, doses are 
    changed, or new medications (including over-the-counter products) are added. *  Please carry medication information at all times in case of emergency situations. These are general instructions for a healthy lifestyle: No smoking/ No tobacco products/ Avoid exposure to second hand smoke Surgeon General's Warning:  Quitting smoking now greatly reduces serious risk to your health. Obesity, smoking, and sedentary lifestyle greatly increases your risk for illness A healthy diet, regular physical exercise & weight monitoring are important for maintaining a healthy lifestyle You may be retaining fluid if you have a history of heart failure or if you experience any of the following symptoms:  Weight gain of 3 pounds or more overnight or 5 pounds in a week, increased swelling in our hands or feet or shortness of breath while lying flat in bed. Please call your doctor as soon as you notice any of these symptoms; do not wait until your next office visit. Recognize signs and symptoms of STROKE: 
 
F-face looks uneven A-arms unable to move or move even S-speech slurred or non-existent T-time-call 911 as soon as signs and symptoms begin-DO NOT go Back to bed or wait to see if you get better-TIME IS BRAIN. If you have not had your influenza or pneumococcal vaccines, please follow up with your primary care physician. The discharge information has been reviewed with the patient and caregiver. The patient and caregiver verbalized understanding. Discharge Orders None Introducing [facility] SERVICES!    
 [place] introduces [employer] patient portal. Now you can access parts of your medical record, email your doctor's office, and request medication refills online. 1. In your internet browser, go to [email or web address] 2. Click on the First Time User? Click Here link in the Sign In box. You will see the New Member Sign Up page. 3. Enter your [employer] Access Code exactly as it appears below. You will not need to use this code after youve completed the sign-up process. If you do not sign up before the expiration date, you must request a new code. · [employer] Access Code: [email or web address] Expires: 6/26/2017 10:53 AM 
 
4. Enter the last four digits of your Social Security Number (xxxx) and Date of Birth (mm/dd/yyyy) as indicated and click Submit. You will be taken to the next sign-up page. 5. Create a Seekly ID. This will be your Seek[employer] login ID and cannot be changed, so think of one that is secure and easy to remember. 6. Create a [employer] password. You can change your password at any time. 7. Enter your Password Reset Question and Answer. This can be used at a later time if you forget your password. 8. Enter your e-mail address. You will receive e-mail notification when new information is available in [address] 9. Click Sign Up. You can now view and download portions of your medical record. 10. Click the Download Summary menu link to download a portable copy of your medical information. If you have questions, please visit the Frequently Asked Questions section of the [employer] website. Remember, [employer] is NOT to be used for urgent needs. For medical emergencies, dial 911. Now available from your iPhone and Android! General Information Please provide this summary of care documentation to your next provider. Patient Signature:  ____________________________________________________________ Date:  ____________________________________________________________  
  
[facility]  Provider Signature: ____________________________________________________________ Date:  ____________________________________________________________

## 2017-04-05 NOTE — ANESTHESIA POSTPROCEDURE EVALUATION
Post-Anesthesia Evaluation and Assessment    Patient: Rno Dennis MRN: [id number]  SSN: [id number]    YOB: 1945  Age: 67 y.o. Sex: male       Cardiovascular Function/Vital Signs  Visit Vitals    /73    Pulse 71    Temp 36.5 °C (97.7 °F)    Resp 20    Ht 5' 11\" (1.803 m)    Wt 66.4 kg (146 lb 6.4 oz)    SpO2 95%    BMI 20.42 kg/m2       Patient is status post MAC anesthesia for Procedure(s):  CATARACT EXTRACTION WITH INTRA OCULAR LENS IMPLANT RIGHT EYE. Nausea/Vomiting: None    Postoperative hydration reviewed and adequate. Pain:  Pain Scale 1: Numeric (0 - 10) (04/05/17 0854)  Pain Intensity 1: 0 (04/05/17 0854)   Managed    Neurological Status:   Neuro (WDL): Exceptions to WDL (04/05/17 0854)  Neuro  Neurologic State: Alert (04/05/17 0854)  LUE Motor Response: Purposeful (04/05/17 0854)  LLE Motor Response: Purposeful (04/05/17 0854)  RUE Motor Response: Purposeful (04/05/17 0854)  RLE Motor Response: Purposeful (04/05/17 0854)   At baseline    Mental Status and Level of Consciousness: Arousable    Pulmonary Status:   O2 Device: Room air (04/05/17 0854)   Adequate oxygenation and airway patent    Complications related to anesthesia: None    Post-anesthesia assessment completed.  No concerns    Signed By: Shannan Crowell MD     April 5, 2017

## 2017-04-05 NOTE — PERIOP NOTES
Javier Cabrera  1945  [id number]    Situation:  Verbal report given from: [address] CRNA  Procedure: Procedure(s):  CATARACT EXTRACTION WITH INTRA OCULAR LENS IMPLANT RIGHT EYE    Background:    Preoperative diagnosis: Combined form of age-related cataract, right eye [H25.811]    Postoperative diagnosis: Combined form of age-related cataract, right eye [H25.811]    :  Dr. Puneet Flores    Assistant(s): Circ-1: Burney Lanes, RN  Scrub Tech-1: Irene Cramer. Adam    Specimens: * No specimens in log *    Assessment:  Intra-procedure medications         Anesthesia gave intra-procedure sedation and medications, see anesthesia flow sheet     Intravenous fluids: LR@ KVO     Vital signs stable       Recommendation:    Permission to share finding with family or friend yes    0902 D/C to home via w/c,accomp[anied to car per RN. All belongings with pt.

## 2017-04-06 PROBLEM — H25.812 COMBINED FORMS OF AGE-RELATED CATARACT OF LEFT EYE: Status: ACTIVE | Noted: 2017-04-06

## 2017-04-10 RX ORDER — MOXIFLOXACIN 5 MG/ML
1 SOLUTION/ DROPS OPHTHALMIC 4 TIMES DAILY
COMMUNITY
End: 2017-06-16 | Stop reason: ALTCHOICE

## 2017-04-10 RX ORDER — PREDNISOLONE ACETATE 10 MG/ML
1 SUSPENSION/ DROPS OPHTHALMIC 4 TIMES DAILY
COMMUNITY
Start: 2017-04-05 | End: 2017-06-16 | Stop reason: ALTCHOICE

## 2017-04-10 RX ORDER — DICLOFENAC SODIUM 1 MG/ML
1 SOLUTION/ DROPS OPHTHALMIC 4 TIMES DAILY
COMMUNITY
End: 2017-06-16 | Stop reason: ALTCHOICE

## 2017-04-10 NOTE — PERIOP NOTES
[facility]  Ambulatory Surgery Unit  Pre-operative Instructions    Surgery/Procedure Date  04/12/2017            Tentative Arrival Time will call day before with exact TOA      1. On the day of your surgery/procedure, please report to the Ambulatory Surgery Unit Registration Desk and sign in at your designated time. The Ambulatory Surgery Unit is located in [facility] on the [facility] side of the [facility] [place] across from the [address] [place] [address]. Please have all of your health insurance cards and a photo ID. 2. You must have someone with you to drive you home, as you should not drive a car for 24 hours following anesthesia. Please make arrangements for a responsible adult friend or family member to stay with you for at least the first 24 hours after your surgery. 3. Do not have anything to eat or drink (including water, gum, mints, coffee, juice) after midnight   04/11/2017  . This may not apply to medications prescribed by your physician. (Please note below the special instructions with medications to take the morning of surgery, if applicable.)    4. We recommend you do not drink any alcoholic beverages for 24 hours before and after your surgery. 5. Stop all Aspirin and non-steroidal anti-inflammatory drugs (i.e. Advil, Aleve) as directed by your surgeon's office. Stop all vitamins and herbal supplements seven days prior to your surgery. If you are currently taking Plavix, Coumadin, or other blood-thinning agents, contact your surgeon for instructions. 6. In an effort to help prevent surgical site infection, we ask that you shower with an anti-bacterial soap (i.e. Dial or Safeguard) for 3 days prior to and on the morning of surgery, using a fresh towel after each shower. Do not apply any lotions, powders or deodorants after the shower on the day of your procedure. If applicable, please do not shave the operative site for 48 hours prior to surgery.      7. Wear comfortable clothes. Wear glasses instead of contacts. Do not bring any money or jewelry. Do not wear make-up, particularly mascara, the morning of your surgery. Do not wear nail polish, particularly if you are having foot /hand surgery. Wear your hair loose or down, no ponytails, buns, martin pins or clips. All body piercings must be removed. 8. You should understand that if you do not follow these instructions your surgery may be cancelled. If your physical condition changes (i.e. fever, cold or flu) please contact your surgeon as soon as possible. 9. It is important that you be on time. If a situation occurs where you may be late, or if you have any questions or problems, please call [phone].    10. Your surgery time may be subject to change. You will receive a phone call the day prior to surgery to confirm your arrival time. 11. Pediatric patients: please bring a change of clothes, diapers, bottle/sippy cup, pacifier, etc.      Special Instructions:    MEDICATIONS TO TAKE THE MORNING OF SURGERY WITH A SIP OF WATER: eye drops, inhalers, norvasc, zantac      I understand a pre-operative phone call will be made to verify my surgery time. In the event that I am not available, I give permission for a message to be left on my answering service and/or with another person? yes         ___________________      ___________________  _________  Pt verbalized understanding of preop instructions via telephone.   (Signature of Patient)          (Witness)                              (Date and Time)

## 2017-04-11 ENCOUNTER — ANESTHESIA EVENT (OUTPATIENT)
Dept: SURGERY | Age: 72
End: 2017-04-11
Payer: COMMERCIAL

## 2017-04-12 ENCOUNTER — ANESTHESIA (OUTPATIENT)
Dept: SURGERY | Age: 72
End: 2017-04-12
Payer: COMMERCIAL

## 2017-04-12 ENCOUNTER — HOSPITAL ENCOUNTER (OUTPATIENT)
Age: 72
Setting detail: OUTPATIENT SURGERY
Discharge: HOME | End: 2017-04-12
Attending: OPHTHALMOLOGY | Admitting: OPHTHALMOLOGY
Payer: COMMERCIAL

## 2017-04-12 ENCOUNTER — SURGERY (OUTPATIENT)
Age: 72
End: 2017-04-12

## 2017-04-12 VITALS
SYSTOLIC BLOOD PRESSURE: 129 MMHG | HEIGHT: 71 IN | OXYGEN SATURATION: 97 % | RESPIRATION RATE: 17 BRPM | TEMPERATURE: 97.8 F | DIASTOLIC BLOOD PRESSURE: 77 MMHG | HEART RATE: 69 BPM | WEIGHT: 147 LBS | BODY MASS INDEX: 20.58 KG/M2

## 2017-04-12 PROBLEM — H59.88 INTRAOPERATIVE MIOSIS: Status: ACTIVE | Noted: 2017-04-12

## 2017-04-12 PROBLEM — H57.03 INTRAOPERATIVE MIOSIS: Status: ACTIVE | Noted: 2017-04-12

## 2017-04-12 PROCEDURE — V2632 POST CHMBR INTRAOCULAR LENS: HCPCS | Performed by: OPHTHALMOLOGY

## 2017-04-12 PROCEDURE — 74011250636 HC RX REV CODE- 250/636

## 2017-04-12 PROCEDURE — 74011250636 HC RX REV CODE- 250/636: Performed by: ANESTHESIOLOGY

## 2017-04-12 PROCEDURE — 74011250636 HC RX REV CODE- 250/636: Performed by: OPHTHALMOLOGY

## 2017-04-12 PROCEDURE — 77030019981 HC RETRCTR IRIS DISP ALCN -B: Performed by: OPHTHALMOLOGY

## 2017-04-12 PROCEDURE — 77030013854 HC PK PHACO KT ALCN -C: Performed by: OPHTHALMOLOGY

## 2017-04-12 PROCEDURE — 77030018846 HC SOL IRR STRL H20 ICUM -A: Performed by: OPHTHALMOLOGY

## 2017-04-12 PROCEDURE — 76030000000 HC AMB SURG OR TIME 0.5 TO 1: Performed by: OPHTHALMOLOGY

## 2017-04-12 PROCEDURE — 76060000061 HC AMB SURG ANES 0.5 TO 1 HR: Performed by: OPHTHALMOLOGY

## 2017-04-12 PROCEDURE — 74011000250 HC RX REV CODE- 250: Performed by: OPHTHALMOLOGY

## 2017-04-12 PROCEDURE — 76210000046 HC AMBSU PH II REC FIRST 0.5 HR: Performed by: OPHTHALMOLOGY

## 2017-04-12 DEVICE — LENS IOL POST 1-PC 6X13 25.0 -- ACRYSOF: Type: IMPLANTABLE DEVICE | Site: EYE | Status: FUNCTIONAL

## 2017-04-12 RX ORDER — MIDAZOLAM HYDROCHLORIDE 1 MG/ML
INJECTION, SOLUTION INTRAMUSCULAR; INTRAVENOUS AS NEEDED
Status: DISCONTINUED | OUTPATIENT
Start: 2017-04-12 | End: 2017-04-12 | Stop reason: HOSPADM

## 2017-04-12 RX ORDER — LIDOCAINE HYDROCHLORIDE 40 MG/ML
3 INJECTION, SOLUTION RETROBULBAR; TOPICAL ONCE
Status: DISCONTINUED | OUTPATIENT
Start: 2017-04-12 | End: 2017-04-12 | Stop reason: HOSPADM

## 2017-04-12 RX ORDER — SODIUM CHLORIDE 0.9 % (FLUSH) 0.9 %
5-10 SYRINGE (ML) INJECTION EVERY 8 HOURS
Status: DISCONTINUED | OUTPATIENT
Start: 2017-04-12 | End: 2017-04-12 | Stop reason: HOSPADM

## 2017-04-12 RX ORDER — CYCLOPENTOLATE HYDROCHLORIDE 20 MG/ML
1 SOLUTION/ DROPS OPHTHALMIC
Status: COMPLETED | OUTPATIENT
Start: 2017-04-12 | End: 2017-04-12

## 2017-04-12 RX ORDER — SODIUM CHLORIDE 9 MG/ML
25 INJECTION, SOLUTION INTRAVENOUS CONTINUOUS
Status: DISCONTINUED | OUTPATIENT
Start: 2017-04-12 | End: 2017-04-12 | Stop reason: HOSPADM

## 2017-04-12 RX ORDER — PROPARACAINE HYDROCHLORIDE 5 MG/ML
1 SOLUTION/ DROPS OPHTHALMIC
Status: COMPLETED | OUTPATIENT
Start: 2017-04-12 | End: 2017-04-12

## 2017-04-12 RX ORDER — SODIUM CHLORIDE, SODIUM LACTATE, POTASSIUM CHLORIDE, CALCIUM CHLORIDE 600; 310; 30; 20 MG/100ML; MG/100ML; MG/100ML; MG/100ML
25 INJECTION, SOLUTION INTRAVENOUS CONTINUOUS
Status: DISCONTINUED | OUTPATIENT
Start: 2017-04-12 | End: 2017-04-12 | Stop reason: HOSPADM

## 2017-04-12 RX ORDER — SODIUM CHLORIDE 0.9 % (FLUSH) 0.9 %
5-10 SYRINGE (ML) INJECTION AS NEEDED
Status: DISCONTINUED | OUTPATIENT
Start: 2017-04-12 | End: 2017-04-12 | Stop reason: HOSPADM

## 2017-04-12 RX ORDER — ONDANSETRON 2 MG/ML
4 INJECTION INTRAMUSCULAR; INTRAVENOUS AS NEEDED
Status: DISCONTINUED | OUTPATIENT
Start: 2017-04-12 | End: 2017-04-12 | Stop reason: HOSPADM

## 2017-04-12 RX ORDER — LIDOCAINE HYDROCHLORIDE 10 MG/ML
0.5 INJECTION, SOLUTION EPIDURAL; INFILTRATION; INTRACAUDAL; PERINEURAL ONCE
Status: COMPLETED | OUTPATIENT
Start: 2017-04-12 | End: 2017-04-12

## 2017-04-12 RX ORDER — FENTANYL CITRATE 50 UG/ML
INJECTION, SOLUTION INTRAMUSCULAR; INTRAVENOUS AS NEEDED
Status: DISCONTINUED | OUTPATIENT
Start: 2017-04-12 | End: 2017-04-12 | Stop reason: HOSPADM

## 2017-04-12 RX ORDER — PHENYLEPHRINE HYDROCHLORIDE 25 MG/ML
1 SOLUTION/ DROPS OPHTHALMIC
Status: COMPLETED | OUTPATIENT
Start: 2017-04-12 | End: 2017-04-12

## 2017-04-12 RX ORDER — DICLOFENAC SODIUM 1 MG/ML
1 SOLUTION/ DROPS OPHTHALMIC
Status: COMPLETED | OUTPATIENT
Start: 2017-04-12 | End: 2017-04-12

## 2017-04-12 RX ORDER — GENTAMICIN SULFATE 0.3 %
0.5 OINTMENT (GRAM) OPHTHALMIC (EYE) 3 TIMES DAILY
Status: DISCONTINUED | OUTPATIENT
Start: 2017-04-12 | End: 2017-04-12 | Stop reason: HOSPADM

## 2017-04-12 RX ORDER — FENTANYL CITRATE 50 UG/ML
25 INJECTION, SOLUTION INTRAMUSCULAR; INTRAVENOUS
Status: DISCONTINUED | OUTPATIENT
Start: 2017-04-12 | End: 2017-04-12 | Stop reason: HOSPADM

## 2017-04-12 RX ORDER — PREDNISOLONE ACETATE 10 MG/ML
1 SUSPENSION/ DROPS OPHTHALMIC 2 TIMES DAILY
Status: DISCONTINUED | OUTPATIENT
Start: 2017-04-12 | End: 2017-04-12 | Stop reason: HOSPADM

## 2017-04-12 RX ORDER — LIDOCAINE HYDROCHLORIDE 10 MG/ML
0.1 INJECTION, SOLUTION EPIDURAL; INFILTRATION; INTRACAUDAL; PERINEURAL AS NEEDED
Status: DISCONTINUED | OUTPATIENT
Start: 2017-04-12 | End: 2017-04-12 | Stop reason: HOSPADM

## 2017-04-12 RX ORDER — DIPHENHYDRAMINE HYDROCHLORIDE 50 MG/ML
12.5 INJECTION, SOLUTION INTRAMUSCULAR; INTRAVENOUS AS NEEDED
Status: DISCONTINUED | OUTPATIENT
Start: 2017-04-12 | End: 2017-04-12 | Stop reason: HOSPADM

## 2017-04-12 RX ADMIN — PHENYLEPHRINE HYDROCHLORIDE 1 DROP: 25 SOLUTION/ DROPS OPHTHALMIC at 08:01

## 2017-04-12 RX ADMIN — MIDAZOLAM HYDROCHLORIDE 1 MG: 1 INJECTION, SOLUTION INTRAMUSCULAR; INTRAVENOUS at 08:36

## 2017-04-12 RX ADMIN — CYCLOPENTOLATE HYDROCHLORIDE 1 DROP: 20 SOLUTION/ DROPS OPHTHALMIC at 08:01

## 2017-04-12 RX ADMIN — PHENYLEPHRINE HYDROCHLORIDE 1 DROP: 25 SOLUTION/ DROPS OPHTHALMIC at 07:49

## 2017-04-12 RX ADMIN — PHENYLEPHRINE HYDROCHLORIDE 1 DROP: 25 SOLUTION/ DROPS OPHTHALMIC at 07:42

## 2017-04-12 RX ADMIN — DICLOFENAC SODIUM 1 DROP: 1 SOLUTION/ DROPS OPHTHALMIC at 07:50

## 2017-04-12 RX ADMIN — DICLOFENAC SODIUM 1 DROP: 1 SOLUTION/ DROPS OPHTHALMIC at 08:01

## 2017-04-12 RX ADMIN — CYCLOPENTOLATE HYDROCHLORIDE 1 DROP: 20 SOLUTION/ DROPS OPHTHALMIC at 07:42

## 2017-04-12 RX ADMIN — PROPARACAINE HYDROCHLORIDE 1 DROP: 5 SOLUTION/ DROPS OPHTHALMIC at 07:54

## 2017-04-12 RX ADMIN — PROPARACAINE HYDROCHLORIDE 1 DROP: 5 SOLUTION/ DROPS OPHTHALMIC at 08:08

## 2017-04-12 RX ADMIN — CYCLOPENTOLATE HYDROCHLORIDE 1 DROP: 20 SOLUTION/ DROPS OPHTHALMIC at 07:49

## 2017-04-12 RX ADMIN — GENTAMICIN SULFATE 0.5 INCH: 3 OINTMENT OPHTHALMIC at 08:46

## 2017-04-12 RX ADMIN — BALANCED SALT SOLUTION 1 BOTTLE: 6.4; .75; .48; .3; 3.9; 1.7 SOLUTION OPHTHALMIC at 08:46

## 2017-04-12 RX ADMIN — VANCOMYCIN HYDROCHLORIDE: 1 INJECTION, POWDER, LYOPHILIZED, FOR SOLUTION INTRAVENOUS at 08:46

## 2017-04-12 RX ADMIN — APRACLONIDINE HYDROCHLORIDE 1 DROP: 10 SOLUTION/ DROPS OPHTHALMIC at 08:46

## 2017-04-12 RX ADMIN — FENTANYL CITRATE 50 MCG: 50 INJECTION, SOLUTION INTRAMUSCULAR; INTRAVENOUS at 08:43

## 2017-04-12 RX ADMIN — PROPARACAINE HYDROCHLORIDE 1 DROP: 5 SOLUTION/ DROPS OPHTHALMIC at 07:49

## 2017-04-12 RX ADMIN — PROPARACAINE HYDROCHLORIDE 1 DROP: 5 SOLUTION/ DROPS OPHTHALMIC at 08:01

## 2017-04-12 RX ADMIN — ONDANSETRON HYDROCHLORIDE 4 MG: 2 INJECTION, SOLUTION INTRAVENOUS at 08:53

## 2017-04-12 RX ADMIN — Medication 1 ML: at 08:46

## 2017-04-12 RX ADMIN — PHENYLEPHRINE HYDROCHLORIDE 1 DROP: 25 SOLUTION/ DROPS OPHTHALMIC at 07:54

## 2017-04-12 RX ADMIN — LIDOCAINE HYDROCHLORIDE 0.5 ML: 10 INJECTION, SOLUTION EPIDURAL; INFILTRATION; INTRACAUDAL; PERINEURAL at 08:46

## 2017-04-12 RX ADMIN — PROPARACAINE HYDROCHLORIDE 1 DROP: 5 SOLUTION/ DROPS OPHTHALMIC at 07:42

## 2017-04-12 RX ADMIN — SODIUM CHLORIDE 25 ML/HR: 900 INJECTION, SOLUTION INTRAVENOUS at 07:51

## 2017-04-12 RX ADMIN — DICLOFENAC SODIUM 1 DROP: 1 SOLUTION/ DROPS OPHTHALMIC at 07:42

## 2017-04-12 RX ADMIN — CYCLOPENTOLATE HYDROCHLORIDE 1 DROP: 20 SOLUTION/ DROPS OPHTHALMIC at 07:54

## 2017-04-12 RX ADMIN — PREDNISOLONE ACETATE 1 DROP: 10 SUSPENSION/ DROPS OPHTHALMIC at 08:46

## 2017-04-12 RX ADMIN — ACETYLCHOLINE CHLORIDE 20 MG: KIT at 08:46

## 2017-04-12 RX ADMIN — DICLOFENAC SODIUM 1 DROP: 1 SOLUTION/ DROPS OPHTHALMIC at 07:54

## 2017-04-12 NOTE — OP NOTES
Name: Ashley Canales MASC-6       updated 3/1/2013  Description:  Complex Kelman phacoemulsification with use of intraoperative iris hooks. PREOPERATIVE DIAGNOSES:  1. Visually impairing combined cataract, Left eye.  2. 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 POSTOPERATIVE DIAGNOSES:   1. Visually impairing combined cataract, Left eye.  2. 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 OPERATION: Complex Kelman phacoemulsification with use of intraoperative iris hooks. Procedure(s):  CATARACT EXTRACTION WITH INTRA OCULAR LENS IMPLANT LEFT EYE, COMPLEX WITH IRIS HOOKS    ANESTHESIA: Topical with intravenous sedation    TYPE OF LENS IMPLANT USED:   Implant Name Type Inv. Item Serial No.  Lot No. LRB No. Used Action   LENS IOL POST 1-PC 6X13 25.0 -- [employer] - [id number]   LENS IOL POST 1-PC 6X13 25.0 -- [employer] [id number] [id number] Left 1 Implanted       PHACO TIME:  76 seconds at an average power of  24.2%. Estimated blood loss: None    Complications: None    Specimen removed: None    DESCRIPTION OF PROCEDURE: The patient was brought to the holding area where an IV was begun at a  keep open rate. The patient received several instillations of Cyclogyl 2% and David-Synephrine 2.5% and proparacaine 0.5% until best pupillary dilatation was achieved. The patient was connected to cardiovascular monitoring. The patient was then brought back to the operating suite and then prepped and draped in the usual manner for ocular surgery. Cardiovascular monitoring was reestablished. The patient received several instillations of Betadine in the inferior conjunctival cul-de-sac along with 4% Xylocaine. The operating microscope was brought into position and the lid speculum was set into position. A paracentesis was created and 1% preservative-free Xylocaine was instilled into the anterior chamber followed by Viscoelastic.  The Iris hooks were placed strategically at 2,4,8 and 10 o'clock to facilitate pupillary mydriasis. The 2.4 mm keratome was utilized to create the corneal incision. The capsulorrhexis was performed in a circular fashion. The hard nucleus of the lens was hydrodissected away from the capsule using BSS solution. Viscoelastic was then instilled into the anterior chamber to protect the corneal endothelium. Phacoemulsification was then carried out followed by irrigation and aspiration of any remaining cortical material. Following this, the sterile lens was removed from the sterile container and inserted into the lens injector. It was inserted into the eye without complications and positioned appropriately on the patient's visual/astigmatic axis. The iris hooks were removed without complications. The Viscoelastic was then removed from the posterior chamber as well as the anterior chamber of the eye, and Miochol was instilled posterior to the iris plane to facilitate pupillary miosis. A single interrupted 10-0 nylon suture was placed at any  sites that required it for secure would closure. Once anterior chamber stability was ensured, the patient received several instillations of Iopidine and pred forte followed by instillation of gentamycin ophthalmic ointment. The lid speculum was removed and the patient was patched with a semi-pressure dressing and shield and was brought to the recovery room in alert and stable and satisfactory postoperative condition. Instructions were given to the patients family for their immediate postoperative care, and the patient is to follow up with me in the office tomorrow.     [address] [place] [address]  4/12/2017

## 2017-04-12 NOTE — ANESTHESIA POSTPROCEDURE EVALUATION
Post-Anesthesia Evaluation and Assessment    Patient: Angelina Alberto MRN: [id number]  SSN: [id number]    YOB: 1945  Age: 67 y.o. Sex: male       Cardiovascular Function/Vital Signs  Visit Vitals    /77 (BP 1 Location: Right arm, BP Patient Position: At rest)    Pulse 69    Temp 36.6 °C (97.8 °F)    Resp 17    Ht 5' 11\" (1.803 m)    Wt 66.7 kg (147 lb)    SpO2 97%    BMI 20.5 kg/m2       Patient is status post MAC anesthesia for Procedure(s):  CATARACT EXTRACTION WITH INTRA OCULAR LENS IMPLANT LEFT EYE, COMPLEX WITH IRIS HOOKS. Nausea/Vomiting: None    Postoperative hydration reviewed and adequate. Pain:  Pain Scale 1: Numeric (0 - 10) (04/12/17 0902)  Pain Intensity 1: 0 (04/12/17 0902)   Managed    Neurological Status:   Neuro (WDL): Within Defined Limits (04/12/17 0902)   At baseline    Mental Status and Level of Consciousness: Arousable    Pulmonary Status:   O2 Device: Room air (04/12/17 0902)   Adequate oxygenation and airway patent    Complications related to anesthesia: None    Post-anesthesia assessment completed.  No concerns    Signed By: Tami Galdamez MD     April 12, 2017

## 2017-04-12 NOTE — PERIOP NOTES
Patient: Cee Ojeda MRN: [id number]  SSN: [id number]   YOB: 1945  Age: 67 y.o. Sex: male     Patient is status post Procedure(s):  CATARACT EXTRACTION WITH INTRA OCULAR LENS IMPLANT LEFT EYE, COMPLEX WITH IRIS HOOKS. Surgeon(s) and Role:     * [address] [place] [address], DO - Primary    Local/Dose/Irrigation:  SEE MAR                  Peripheral IV 04/12/17 Left Arm (Active)   Site Assessment Clean, dry, & intact; Swelling 4/12/2017  7:33 AM   Phlebitis Assessment 0 4/12/2017  7:33 AM   Infiltration Assessment 0 4/12/2017  7:33 AM   Dressing Status New 4/12/2017  7:33 AM   Dressing Type Tape;Transparent 4/12/2017  7:33 AM   Hub Color/Line Status Blue; Infusing 4/12/2017  7:33 AM                           Dressing/Packing:  Wound Eye Left-DRESSING TYPE: Eye patch; Eye shield (SECURED WITH TAPE BY DOCTOR) (04/12/17 0700)  Splint/Cast:  ]

## 2017-04-12 NOTE — IP AVS SNAPSHOT
[address] [facility] 
[phone] Patient: Chandana Valerio MRN: [id number] QQA:[id number] You are allergic to the following Allergen Reactions Lisinopril Angioedema Entire face swells Recent Documentation Height Weight BMI Smoking Status 1.803 m 66.7 kg 20.5 kg/m2 Former Smoker Emergency Contacts Name Discharge Info Relation Home Work Mobile [address] [facility]  Spouse [3] [phone] HollisDenae DISCHARGE CAREGIVER [3] Child [2] [phone] About your hospitalization You were admitted on:  April 12, 2017 You last received care in the:  [facility] You were discharged on:  April 12, 2017 Unit phone number:  [phone] Why you were hospitalized Your primary diagnosis was:  Combined Forms Of Age-Related Cataract Of Left Eye  
  
  
 
  
  
Providers Seen During Your Hospitalizations Provider Role Specialty Primary office phone Maile Medel DO Attending Provider Ophthalmology [phone] Your Primary Care Physician (PCP) Primary Care Physician Office Phone Office Fax Marly Diaz [phone] Follow-up Information Follow up With Details Comments Contact Info Charli Bernal MD   [facility] [address] [facility] 
[phone] Your Appointments Wednesday April 12, 2017 CATARACT EXTRACTION WITH INTRA OCULAR LENS IMPLANT with Maile Medel DO  
[facility] AMB SURGERY UNIT (RI OR PRE ASSESSMENT) [address] [facility]  
[phone] Current Discharge Medication List  
  
ASK your doctor about these medications Dose & Instructions Dispensing Information Comments Morning Noon Evening Bedtime  
 albuterol 90 mcg/actuation inhaler Commonly known as:  PROVENTIL HFA, VENTOLIN HFA, PROAIR HFA  
   
 Your last dose was: Your next dose is:    
   
   
 Dose:  1 Puff Take 1 Puff by inhalation every four (4) hours as needed. Refills:  0  
     
   
   
   
  
 amLODIPine 10 mg tablet Commonly known as:  Christie Half Your last dose was: Your next dose is: TAKE 1 TABLET EVERY DAY Quantity:  90 Tab Refills:  1  
     
   
   
   
  
 aspirin 81 mg chewable tablet Your last dose was: Your next dose is:    
   
   
 Dose:  81 mg Take 1 Tab by mouth daily. Quantity:  30 Tab Refills:  1  
     
   
   
   
  
 atorvastatin 10 mg tablet Commonly known as:  LIPITOR Your last dose was: Your next dose is: TAKE 1 TABLET  NIGHTLY Quantity:  90 Tab Refills:  3 CENTRUM SILVER Tab tablet Generic drug:  multivitamins-minerals-lutein Your last dose was: Your next dose is:    
   
   
 Dose:  1 Tab Take 1 Tab by mouth daily. Refills:  0  
     
   
   
   
  
 diclofenac 0.1 % ophthalmic solution Commonly known as:  VOLTAREN Your last dose was: Your next dose is:    
   
   
 Dose:  1 Drop Administer 1 Drop to both eyes four (4) times daily. Indications: POST-OP OCULAR INFLAMMATION Refills:  0  
     
   
   
   
  
 prednisoLONE acetate 1 % ophthalmic suspension Commonly known as:  PRED FORTE Your last dose was: Your next dose is:    
   
   
 Dose:  1 Drop Administer 1 Drop to both eyes four (4) times daily. Refills:  0  
     
   
   
   
  
 raNITIdine 150 mg tablet Commonly known as:  ZANTAC Your last dose was: Your next dose is:    
   
   
 Dose:  150 mg Take 150 mg by mouth daily. Indications: GASTROESOPHAGEAL REFLUX Refills:  0  
     
   
   
   
  
 SYMBICORT 160-4.5 mcg/actuation HFA inhaler Generic drug:  budesonide-formoterol Your last dose was: Your next dose is: INHALE 1 PUFF TWICE DAILY Quantity:  2 Inhaler Refills:  5  
     
   
   
   
  
 TYLENOL EXTRA STRENGTH 500 mg tablet Generic drug:  acetaminophen Your last dose was: Your next dose is:    
   
   
 Dose:  1000 mg Take 1,000 mg by mouth every six (6) hours as needed. Refills:  0  
     
   
   
   
  
 * VIGAMOX 0.5 % ophthalmic solution Generic drug:  moxifloxacin Your last dose was: Your next dose is:    
   
   
 Dose:  1 Drop Administer 1 Drop to right eye four (4) times daily. Refills:  0  
     
   
   
   
  
 * VIGAMOX 0.5 % ophthalmic solution Generic drug:  moxifloxacin Your last dose was: Your next dose is:    
   
   
 Dose:  1 Drop Administer 1 Drop to left eye four (4) times daily. Refills:  0  
     
   
   
   
  
 VITAMIN B-12 1,000 mcg tablet Generic drug:  cyanocobalamin Your last dose was: Your next dose is:    
   
   
 Dose:  2500 mcg Take 2,500 mcg by mouth daily. Refills:  0  
     
   
   
   
  
 * Notice: This list has 2 medication(s) that are the same as other medications prescribed for you. Read the directions carefully, and ask your doctor or other care provider to review them with you. Discharge Instructions Chela Brennan D.O., P.CMiya 
[facility]. 
[phone] Post-Operative Instructions for Cataract Surgery ? Remove your eye shield and bandage at 12 noon the same day as surgery and start your eye drops. THROW AWAY THE GAUZE UNDER THE SHIELD. ? Place the blue eye shield back on for one week while asleep, even if napping in the recliner. Use the tape included in your bag.   
       
 
? DO NOT RUB YOUR EYE EVER! DO NOT WIPE YOUR EYE! ONLY WIPE ON YOUR CHEEK! 
 
            DO NOT WEAR EYE MAKEUP FOR 1 WEEK! 
 
 
? You may take a bath today and you can shower starting tomorrow. ? You may resume your previous diet. ? If you use glaucoma drops in the operative eye, continue them as directed. ? Common symptoms after surgery include a scratchy feeling, slight headache, red eye, and/or blurred vision. You may use Advil or Tylenol for any discomfort. ? Avoid strenuous activities and driving until you see Dr. Jason Terrell tomorrow. Please use care when walking, your depth perception may be altered. ? Bring your bag with your drops to your follow up appointment. Below are Instructions On How To Use Your Eye Drops. ON THE DAY OF SURGERY: 
 
? Use all three eye drops at 12 noon, 4pm, and 8pm.  Wait 10 minutes between drops. THE DAY AFTER SURGERY: 
 
? You will use the drops 4 times a day at 8am, 12 noon, 4pm, and 8pm. 
? Use the drops every day until bottles are empty. Vigamox (tan top) Put 1 drop in at 8am, 12 noon, 4pm, and 8pm. 
 
Pred Acetate (pink top) Put 1 drop in at 8:10am, 12:10pm, 4:10pm, and 8:10pm. Shake before using. Diclofenac Put 1 drop in at 8:20am, 12:20pm, 4:20pm, 8:20pm. 
 
CONTINUE DROPS UNTIL ALL BOTTLES ARE EMPTY! Follow-up appointment tomorrow at Dr. Magan Pichardo office:_____845 am_______________ Please call the office at [phone] if you experience severe pain or nausea. DISCHARGE SUMMARY from Nurse The following personal items collected during your admission are returned to you:  
Dental Appliance: Dental Appliances: Lowers, Uppers Vision: Visual Aid: None Hearing Aid:   
Jewelry: Jewelry: None Clothing: Clothing: Pants, Shirt, Socks, Undergarments, With patient Other Valuables: Other Valuables: Cell Phone, LINA (on person) Valuables sent to safe:   
 
PATIENT INSTRUCTIONS: 
 
After general anesthesia or intravenous sedation, for 24 hours or while taking prescription arcotics: · Someone should be with you for the next 24 hours. · For your own safety, a responsible adult must drive you home. · Limit your activities · Recommended activity: Rest today and no driving until after your appointment with Dr. Jason Terrell tomorrow. · Do not drive and operate hazardous machinery · Do not make important personal or business decisions · Do  not drink alcoholic beverages · If you have not urinated within 8 hours after discharge, please contact your surgeon on call. Report the following to your surgeon: 
· Excessive pain, swelling, redness or odor of or around the surgical area · Temperature over 100.5 · Any nausea and vomiting ·  
· You will receive a Post Operative Call from one of the Recovery Room Nurses on the day after your surgery to check on you. It is very important for us to know how you are recovering after your surgery. · You may receive an e-mail or letter in the mail from [place] regarding your experience with us in the Ambulatory Surgery Unit. Your feedback is valuable to us and we appreciate your participation in the survey. ·  
·  
 
· If the above instructions are not adequate, please contact Kayleigh Silva RN, [facility] anesthesia Nurse Manager or our Anesthesiologist, at [phone]. ·  
· We wish you a speedy recovery ? What to do at Home: *  Please give a list of your current medications to your Primary Care Provider. *  Please update this list whenever your medications are discontinued, doses are 
    changed, or new medications (including over-the-counter products) are added. *  Please carry medication information at all times in case of emergency situations. These are general instructions for a healthy lifestyle: No smoking/ No tobacco products/ Avoid exposure to second hand smoke Surgeon General's Warning:  Quitting smoking now greatly reduces serious risk to your health. Obesity, smoking, and sedentary lifestyle greatly increases your risk for illness A healthy diet, regular physical exercise & weight monitoring are important for maintaining a healthy lifestyle You may be retaining fluid if you have a history of heart failure or if you experience any of the following symptoms:  Weight gain of 3 pounds or more overnight or 5 pounds in a week, increased swelling in our hands or feet or shortness of breath while lying flat in bed. Please call your doctor as soon as you notice any of these symptoms; do not wait until your next office visit. Recognize signs and symptoms of STROKE: 
 
F-face looks uneven A-arms unable to move or move even S-speech slurred or non-existent T-time-call 911 as soon as signs and symptoms begin-DO NOT go Back to bed or wait to see if you get better-TIME IS BRAIN. If you have not had your influenza or pneumococcal vaccines, please follow up with your primary care physician. The discharge information has been reviewed with the patient and caregiver. The patient and caregiver verbalized understanding. Discharge Orders None Introducing hospitals & [facility] SERVICES! [employer] introduces [employer]orStyle patient portal. Now you can access parts of your medical record, email your doctor's office, and request medication refills online. 1. In your internet browser, go to [email or web address] 2. Click on the First Time User? Click Here link in the Sign In box. You will see the New Member Sign Up page. 3. Enter your AdorStyle Access Code exactly as it appears below. You will not need to use this code after youve completed the sign-up process. If you do not sign up before the expiration date, you must request a new code. · AdorStyle Access Code: [email or web address] Expires: 6/26/2017 10:53 AM 
 
4. Enter the last four digits of your Social Security Number (xxxx) and Date of Birth (mm/dd/yyyy) as indicated and click Submit. You will be taken to the next sign-up page. 5. Create a AdorStyle ID. This will be your AdorStyle login ID and cannot be changed, so think of one that is secure and easy to remember. 6. Create a Collisionable password. You can change your password at any time. 7. Enter your Password Reset Question and Answer. This can be used at a later time if you forget your password. 8. Enter your e-mail address. You will receive e-mail notification when new information is available in [address] 9. Click Sign Up. You can now view and download portions of your medical record. 10. Click the Download Summary menu link to download a portable copy of your medical information. If you have questions, please visit the Frequently Asked Questions section of the Collisionable website. Remember, Collisionable is NOT to be used for urgent needs. For medical emergencies, dial 911. Now available from your iPhone and Android! General Information Please provide this summary of care documentation to your next provider. Patient Signature:  ____________________________________________________________ Date:  ____________________________________________________________  
  
Deleon Ledger Provider Signature:  ____________________________________________________________ Date:  ____________________________________________________________

## 2017-04-12 NOTE — DISCHARGE INSTRUCTIONS
Bryce Whitman D.O., PMiyaCMiya  [facility] [address].  [phone]    Post-Operative Instructions for Cataract Surgery     Remove your eye shield and bandage at 12 noon the same day as surgery and start your eye drops. THROW AWAY THE GAUZE UNDER THE SHIELD.  Place the blue eye shield back on for one week while asleep, even if napping in the recliner. Use the tape included in your bag.  DO NOT RUB YOUR EYE EVER! DO NOT WIPE YOUR EYE! ONLY WIPE ON YOUR CHEEK!                DO NOT WEAR EYE MAKEUP FOR 1 WEEK!  You may take a bath today and you can shower starting tomorrow.  You may resume your previous diet.  If you use glaucoma drops in the operative eye, continue them as directed.  Common symptoms after surgery include a scratchy feeling, slight headache, red eye, and/or blurred vision. You may use Advil or Tylenol for any discomfort.  Avoid strenuous activities and driving until you see Dr. Mary Carmen Smith tomorrow. Please use care when walking, your depth perception may be altered.  Bring your bag with your drops to your follow up appointment. Below are Instructions On How To Use Your Eye Drops. ON THE DAY OF SURGERY:     Use all three eye drops at 12 noon, 4pm, and 8pm.  Wait 10 minutes between drops. THE DAY AFTER SURGERY:     You will use the drops 4 times a day at 8am, 12 noon, 4pm, and 8pm.   Use the drops every day until bottles are empty. Vigamox (tan top) Put 1 drop in at 8am, 12 noon, 4pm, and 8pm.    Pred Acetate (pink top) Put 1 drop in at 8:10am, 12:10pm, 4:10pm, and 8:10pm. Shake before using. Diclofenac Put 1 drop in at 8:20am, 12:20pm, 4:20pm, 8:20pm.    CONTINUE DROPS UNTIL ALL BOTTLES ARE EMPTY! Follow-up appointment tomorrow at Dr. Despina Heimlich office:_____845 am_______________    Please call the office at [phone] if you experience severe pain or nausea.        DISCHARGE SUMMARY from Nurse    The following personal items collected during your admission are returned to you:   Dental Appliance: Dental Appliances: Lowers, Uppers  Vision: Visual Aid: None  Hearing Aid:    Jewelry: Jewelry: None  Clothing: Clothing: Pants, Shirt, Socks, Undergarments, With patient  Other Valuables: Other Valuables: Cell Phone, Marlen Honor (on person)  Valuables sent to safe:      PATIENT INSTRUCTIONS:    After general anesthesia or intravenous sedation, for 24 hours or while taking prescription arcotics:  · Someone should be with you for the next 24 hours. · For your own safety, a responsible adult must drive you home. · Limit your activities  · Recommended activity: Rest today and no driving until after your appointment with Dr. Lee Chen tomorrow. · Do not drive and operate hazardous machinery  · Do not make important personal or business decisions  · Do  not drink alcoholic beverages  · If you have not urinated within 8 hours after discharge, please contact your surgeon on call. Report the following to your surgeon:  · Excessive pain, swelling, redness or odor of or around the surgical area  · Temperature over 100.5  · Any nausea and vomiting   ·   · You will receive a Post Operative Call from one of the Recovery Room Nurses on the day after your surgery to check on you. It is very important for us to know how you are recovering after your surgery. · You may receive an e-mail or letter in the mail from [place] regarding your experience with us in the Ambulatory Surgery Unit. Your feedback is valuable to us and we appreciate your participation in the survey. ·   ·     · If the above instructions are not adequate, please contact Damien Garzon RN, [facility] anesthesia Nurse Manager or our Anesthesiologist, at [phone]. ·   · We wish you a speedy recovery ? What to do at Home:    *  Please give a list of your current medications to your Primary Care Provider.     *  Please update this list whenever your medications are discontinued, doses are      changed, or new medications (including over-the-counter products) are added. *  Please carry medication information at all times in case of emergency situations. These are general instructions for a healthy lifestyle:    No smoking/ No tobacco products/ Avoid exposure to second hand smoke    Surgeon General's Warning:  Quitting smoking now greatly reduces serious risk to your health. Obesity, smoking, and sedentary lifestyle greatly increases your risk for illness    A healthy diet, regular physical exercise & weight monitoring are important for maintaining a healthy lifestyle    You may be retaining fluid if you have a history of heart failure or if you experience any of the following symptoms:  Weight gain of 3 pounds or more overnight or 5 pounds in a week, increased swelling in our hands or feet or shortness of breath while lying flat in bed. Please call your doctor as soon as you notice any of these symptoms; do not wait until your next office visit. Recognize signs and symptoms of STROKE:    F-face looks uneven    A-arms unable to move or move even    S-speech slurred or non-existent    T-time-call 911 as soon as signs and symptoms begin-DO NOT go       Back to bed or wait to see if you get better-TIME IS BRAIN. If you have not had your influenza or pneumococcal vaccines, please follow up with your primary care physician. The discharge information has been reviewed with the patient and caregiver. The patient and caregiver verbalized understanding.

## 2017-06-12 RX ORDER — AMLODIPINE BESYLATE 10 MG/1
TABLET ORAL
Qty: 90 TAB | Refills: 1 | Status: SHIPPED | OUTPATIENT
Start: 2017-06-12 | End: 2017-12-19 | Stop reason: SDUPTHER

## 2017-06-16 ENCOUNTER — OFFICE VISIT (OUTPATIENT)
Dept: INTERNAL MEDICINE CLINIC | Age: 72
End: 2017-06-16

## 2017-06-16 VITALS
RESPIRATION RATE: 18 BRPM | HEIGHT: 71 IN | OXYGEN SATURATION: 97 % | HEART RATE: 74 BPM | DIASTOLIC BLOOD PRESSURE: 81 MMHG | SYSTOLIC BLOOD PRESSURE: 138 MMHG | WEIGHT: 149.2 LBS | BODY MASS INDEX: 20.89 KG/M2 | TEMPERATURE: 97.7 F

## 2017-06-16 DIAGNOSIS — E78.00 HYPERCHOLESTEROLEMIA: ICD-10-CM

## 2017-06-16 DIAGNOSIS — R73.09 ELEVATED HEMOGLOBIN A1C: ICD-10-CM

## 2017-06-16 DIAGNOSIS — I10 ESSENTIAL HYPERTENSION: Primary | ICD-10-CM

## 2017-06-16 DIAGNOSIS — R73.03 PREDIABETES: ICD-10-CM

## 2017-06-16 RX ORDER — ATORVASTATIN CALCIUM 10 MG/1
TABLET, FILM COATED ORAL
Qty: 90 TAB | Refills: 3 | Status: SHIPPED | OUTPATIENT
Start: 2017-06-16 | End: 2018-06-27 | Stop reason: SDUPTHER

## 2017-06-16 NOTE — PROGRESS NOTES
SUBJECTIVE:   Mr. Corby Saunders is a 67 y.o. male who is here for follow up of htn. Pt's BP is elevated at 142/80 and 138/81 upon recheck today. Pt reports he has not been checking his BP at home, but denies having any recent problems with BP. Pt states he does consume salt, such as wren and salt on his eggs. Pt reports his \"old lady\" says he uses too much salt. Pt states he drinks 2-3 cans of Sprite daily. Pt states he stopped drinking 3-4 Pepsi's daily several years ago. Pt claims he drinks water now as well. Pt c/o numbness and tingling in L hand at night. Pt states he regains feeling after moving his hand around. Pt states he sleeps on his L side with his hand by his side. Pt denies numbness or tingling during the day. Pt c/o persistent muscle cramps. Pt states he has tried many things without relief. Pt states he has been doing yard work and asked if this may cause the cramping. Pt reports he quit smoking (2 packs daily) several years ago. Pt states [employer] calls every few months to check in and discuss doctor visits, etc.     At this time, he is otherwise doing well and has brought no other complaints to my attention today. For a list of the medical issues addressed today, see the assessment and plan below. PMH:   Past Medical History:   Diagnosis Date    [facility]) 2012    right lung SCC stage I    COPD     SIRS    Dyspepsia and other specified disorders of function of stomach     Essential hypertension     GERD (gastroesophageal reflux disease)     Hypercholesterolemia     Hyperlipidemia     Prediabetes 8/26/2015    Pseudogout 8/6/2012     PSH:  has a past surgical history that includes chest surgery procedure unlisted; other surgical (10 26 12); other surgical (11/13/12); hernia repair (2011); and hernia repair (5/20/13). All: is allergic to lisinopril.    MEDS:   Current Outpatient Prescriptions   Medication Sig    atorvastatin (LIPITOR) 10 mg tablet TAKE 1 TABLET NIGHTLY    amLODIPine (NORVASC) 10 mg tablet TAKE 1 TABLET EVERY DAY    SYMBICORT 160-4.5 mcg/actuation HFA inhaler INHALE 1 PUFF TWICE DAILY    cyanocobalamin (VITAMIN B-12) 1,000 mcg tablet Take 2,500 mcg by mouth daily.  ranitidine (ZANTAC) 150 mg tablet Take 150 mg by mouth daily. Indications: GASTROESOPHAGEAL REFLUX    multivitamins-minerals-lutein (CENTRUM SILVER) Tab Take 1 Tab by mouth daily.  acetaminophen (TYLENOL EXTRA STRENGTH) 500 mg tablet Take 1,000 mg by mouth every six (6) hours as needed.  aspirin 81 mg chewable tablet Take 1 Tab by mouth daily.  albuterol (PROVENTIL HFA, VENTOLIN HFA) 90 mcg/actuation inhaler Take 1 Puff by inhalation every four (4) hours as needed. No current facility-administered medications for this visit. FH: family history is not on file. SH:  reports that he quit smoking about 4 years ago. He has a 67.50 pack-year smoking history. He has never used smokeless tobacco. He reports that he does not drink alcohol or use illicit drugs. Review of Systems - History obtained from the patient  General ROS: negative  Psychological ROS: negative  Ophthalmic ROS: negative  ENT ROS: negative  Respiratory ROS: no cough, shortness of breath, or wheezing  Cardiovascular ROS: no chest pain or dyspnea on exertion  Gastrointestinal ROS: no abdominal pain, change in bowel habits, or black or bloody stools  Genito-Urinary ROS: negative  Musculoskeletal ROS: +occasional muscle cramps, otherwise negative  Neurological ROS: negative  Dermatological ROS: negative    OBJECTIVE:   Vitals:   Visit Vitals    /81    Pulse 74    Temp 97.7 °F (36.5 °C) (Oral)    Resp 18    Ht 5' 11\" (1.803 m)    Wt 149 lb 3.2 oz (67.7 kg)    SpO2 97%    BMI 20.81 kg/m2      Gen: Pleasant 67 y.o.  male in NAD. HEENT: NC/AT. HEART: RRR, No M/G/R. LUNGS: CTAB No W/R. EXTREMITIES: Warm. No C/C/E. NEURO: Alert and oriented x 3. Cranial nerves grossly intact.   No focal sensory or motor deficits noted. SKIN: Warm. Dry. No rashes or other lesions noted. ASSESSMENT/ PLAN:   Erica Long was seen today for hypertension and hand pain. Diagnoses and all orders for this visit:    Essential hypertension  -     METABOLIC PANEL, COMPREHENSIVE    Hypercholesterolemia  -     atorvastatin (LIPITOR) 10 mg tablet; TAKE 1 TABLET  NIGHTLY  -     LIPID PANEL  -     METABOLIC PANEL, COMPREHENSIVE    Prediabetes    Elevated hemoglobin W1O  -     METABOLIC PANEL, COMPREHENSIVE  -     HEMOGLOBIN A1C WITH EAG      Pt will return for fasting labs. This patient's blood pressure is stable and controlled on the current medication. Continue the current regimen. I ordered a CMP lab for continued monitoring of htn. I ordered a Hgb A1C for monitoring of elevated Hgb A1C. I ordered a lipid panel and CMP lab for continued monitoring of hypercholesterolemia. I refilled Lipitor 10mg for continued management of hypercholesterolemia. I advised pt to reduce his sodium intake by not adding salt to foods and eating salty foods less frequently. Pt was advised to reduce sugar intake by only drinking 2 sodas per day. I advised pt that 1 tsp of sugar in his coffee in the am was ok. Pt was advised to stretch and massage his legs before bed to decrease occurrence of muscle cramps. Pt will f/u in 6 months htn and hld. Follow-up Disposition:  Return in about 6 months (around 12/16/2017) for follow up htn and hld. I have reviewed the patient's medications and risks/side effects/benefits were discussed. Diagnosis(-es) explained to patient and questions answered. Literature provided where appropriate.      Written by Gary Salcedo, as dictated by Mary Iverson MD.

## 2017-06-16 NOTE — MR AVS SNAPSHOT
Visit Information Date & Time Provider Department Dept. Phone Encounter #  
 6/16/2017 11:45 AM Nisha Claudio, [address] [phone] [id number] Follow-up Instructions Return in about 6 months (around 12/16/2017) for follow up htn and hld. Upcoming Health Maintenance Date Due  
 GLAUCOMA SCREENING Q2Y 3/25/2009 COLONOSCOPY 9/13/2017 MEDICARE YEARLY EXAM 6/17/2017 INFLUENZA AGE 9 TO ADULT 8/1/2017 DTaP/Tdap/Td series (2 - Td) 12/16/2026 Allergies as of 6/16/2017  Review Complete On: 6/16/2017 By: Nisha Claudio MD  
  
 Severity Noted Reaction Type Reactions Lisinopril High 08/06/2012   Systemic Angioedema Entire face swells Current Immunizations  Reviewed on 11/16/2012 Name Date Influenza Vaccine 2/26/2016, 9/23/2014 Pneumococcal Polysaccharide (PPSV-23) 9/23/2014 Pneumococcal Vaccine (Unspecified Type) 10/6/2006 Not reviewed this visit You Were Diagnosed With   
  
 Codes Comments Essential hypertension    -  Primary ICD-10-CM: I10 
ICD-9-CM: 401.9 Hypercholesterolemia     ICD-10-CM: E78.00 ICD-9-CM: 272.0 Prediabetes     ICD-10-CM: R73.03 
ICD-9-CM: 790.29 Elevated hemoglobin A1c     ICD-10-CM: R73.09 
ICD-9-CM: 790.29 Vitals BP Pulse Temp Resp Height(growth percentile) Weight(growth percentile) 142/80 74 97.7 °F (36.5 °C) (Oral) 18 5' 11\" (1.803 m) 149 lb 3.2 oz (67.7 kg) SpO2 BMI Smoking Status 97% 20.81 kg/m2 Former Smoker BMI and BSA Data Body Mass Index Body Surface Area  
 20.81 kg/m 2 1.84 m 2 Preferred Pharmacy Pharmacy Name Phone [address] [phone] Your Updated Medication List  
  
   
This list is accurate as of: 6/16/17 12:46 PM.  Always use your most recent med list.  
  
  
  
  
 albuterol 90 mcg/actuation inhaler Commonly known as:  PROVENTIL HFA, VENTOLIN HFA, PROAIR HFA Take 1 Puff by inhalation every four (4) hours as needed. amLODIPine 10 mg tablet Commonly known as:  Mackenzie Dural TAKE 1 TABLET EVERY DAY  
  
 aspirin 81 mg chewable tablet Take 1 Tab by mouth daily. atorvastatin 10 mg tablet Commonly known as:  LIPITOR  
TAKE 1 TABLET  NIGHTLY CENTRUM SILVER Tab tablet Generic drug:  multivitamins-minerals-lutein Take 1 Tab by mouth daily. raNITIdine 150 mg tablet Commonly known as:  ZANTAC Take 150 mg by mouth daily. Indications: GASTROESOPHAGEAL REFLUX  
  
 SYMBICORT 160-4.5 mcg/actuation HFA inhaler Generic drug:  budesonide-formoterol INHALE 1 PUFF TWICE DAILY  
  
 TYLENOL EXTRA STRENGTH 500 mg tablet Generic drug:  acetaminophen Take 1,000 mg by mouth every six (6) hours as needed. VITAMIN B-12 1,000 mcg tablet Generic drug:  cyanocobalamin Take 2,500 mcg by mouth daily. Prescriptions Sent to Pharmacy Refills  
 atorvastatin (LIPITOR) 10 mg tablet 3 Sig: TAKE 1 TABLET  NIGHTLY Class: Normal  
 Pharmacy: [address] [place] [address], [address] [place] [address] #: [phone] We Performed the Following HEMOGLOBIN A1C WITH EAG [54402 CPT(R)] LIPID PANEL [23678 CPT(R)] METABOLIC PANEL, COMPREHENSIVE [29702 CPT(R)] Follow-up Instructions Return in about 6 months (around 12/16/2017) for follow up htn and hld. Introducing [facility]! [employer] introduces [employer] patient portal. Now you can access parts of your medical record, email your doctor's office, and request medication refills online. 1. In your internet browser, go to [email or web address] 2. Click on the First Time User? Click Here link in the Sign In box. You will see the New Member Sign Up page. 3. Enter your FounderSync Access Code exactly as it appears below.  You will not need to use this code after youve completed the sign-up process. If you do not sign up before the expiration date, you must request a new code. · [employer] Access Code: [email or web address] Expires: 6/26/2017 10:53 AM 
 
4. Enter the last four digits of your Social Security Number (xxxx) and Date of Birth (mm/dd/yyyy) as indicated and click Submit. You will be taken to the next sign-up page. 5. Create a Underground Solutions ID. This will be your Underground Solutions login ID and cannot be changed, so think of one that is secure and easy to remember. 6. Create a [employer] Solutions password. You can change your password at any time. 7. Enter your Password Reset Question and Answer. This can be used at a later time if you forget your password. 8. Enter your e-mail address. You will receive e-mail notification when new information is available in [address] 9. Click Sign Up. You can now view and download portions of your medical record. 10. Click the Download Summary menu link to download a portable copy of your medical information. If you have questions, please visit the Frequently Asked Questions section of the Underground Solutions website. Remember, [employer] Solutions is NOT to be used for urgent needs. For medical emergencies, dial 911. Now available from your iPhone and Android! Please provide this summary of care documentation to your next provider. Your primary care clinician is listed as Sharon Neumann. If you have any questions after today's visit, please call [phone].

## 2017-06-16 NOTE — PROGRESS NOTES
1. Have you been to the ER, urgent care clinic since your last visit? Hospitalized since your last visit? No    2. Have you seen or consulted any other health care providers outside of the [address] [place] [address] since your last visit? Include any pap smears or colon screening.  No

## 2017-06-24 LAB
ALBUMIN SERPL-MCNC: 4.5 G/DL (ref 3.5–4.8)
ALBUMIN/GLOB SERPL: 1.8 {RATIO} (ref 1.2–2.2)
ALP SERPL-CCNC: 11 IU/L (ref 39–117)
ALT SERPL-CCNC: 17 IU/L (ref 0–44)
AST SERPL-CCNC: 22 IU/L (ref 0–40)
BILIRUB SERPL-MCNC: 0.6 MG/DL (ref 0–1.2)
BUN SERPL-MCNC: 13 MG/DL (ref 8–27)
BUN/CREAT SERPL: 15 (ref 10–24)
CALCIUM SERPL-MCNC: 9.4 MG/DL (ref 8.6–10.2)
CHLORIDE SERPL-SCNC: 100 MMOL/L (ref 96–106)
CHOLEST SERPL-MCNC: 171 MG/DL (ref 100–199)
CO2 SERPL-SCNC: 24 MMOL/L (ref 18–29)
CREAT SERPL-MCNC: 0.87 MG/DL (ref 0.76–1.27)
EST. AVERAGE GLUCOSE BLD GHB EST-MCNC: 123 MG/DL
GLOBULIN SER CALC-MCNC: 2.5 G/DL (ref 1.5–4.5)
GLUCOSE SERPL-MCNC: 119 MG/DL (ref 65–99)
HBA1C MFR BLD: 5.9 % (ref 4.8–5.6)
HDLC SERPL-MCNC: 39 MG/DL
LDLC SERPL CALC-MCNC: 109 MG/DL (ref 0–99)
POTASSIUM SERPL-SCNC: 4.5 MMOL/L (ref 3.5–5.2)
PROT SERPL-MCNC: 7 G/DL (ref 6–8.5)
SODIUM SERPL-SCNC: 141 MMOL/L (ref 134–144)
TRIGL SERPL-MCNC: 116 MG/DL (ref 0–149)
VLDLC SERPL CALC-MCNC: 23 MG/DL (ref 5–40)

## 2017-06-28 NOTE — PROGRESS NOTES
A1c-Your current hgbA1c is lower than your last level of 6.1. Keep up the good work! Continue to work on following a diet low in simple carbohydrates and exercise. Recheck this test: hgbA1c  in  3 months. Continue with current  medications. Lipid panel-Improved  Your current lab results reveal a elevated ldl. Your total cholesterol should be under 200 and your ldl under 100. Work on following a low fat diet and exercise at least three times a week. CMP-Normal electrolyte levels except for a elevation in glucose levels, normal renal, and liver function.

## 2017-12-20 RX ORDER — AMLODIPINE BESYLATE 10 MG/1
TABLET ORAL
Qty: 90 TAB | Refills: 1 | Status: SHIPPED | OUTPATIENT
Start: 2017-12-20 | End: 2018-06-27 | Stop reason: SDUPTHER

## 2018-02-08 ENCOUNTER — ANESTHESIA EVENT (OUTPATIENT)
Dept: ENDOSCOPY | Age: 73
End: 2018-02-08
Payer: MEDICARE

## 2018-02-08 ENCOUNTER — ANESTHESIA (OUTPATIENT)
Dept: ENDOSCOPY | Age: 73
End: 2018-02-08
Payer: MEDICARE

## 2018-02-08 ENCOUNTER — HOSPITAL ENCOUNTER (OUTPATIENT)
Age: 73
Setting detail: OUTPATIENT SURGERY
Discharge: HOME | End: 2018-02-08
Attending: SPECIALIST | Admitting: SPECIALIST
Payer: MEDICARE

## 2018-02-08 VITALS
TEMPERATURE: 98.2 F | HEART RATE: 71 BPM | SYSTOLIC BLOOD PRESSURE: 132 MMHG | OXYGEN SATURATION: 97 % | WEIGHT: 150.38 LBS | DIASTOLIC BLOOD PRESSURE: 89 MMHG | BODY MASS INDEX: 21.05 KG/M2 | HEIGHT: 71 IN | RESPIRATION RATE: 11 BRPM

## 2018-02-08 PROCEDURE — 74011250636 HC RX REV CODE- 250/636: Performed by: SPECIALIST

## 2018-02-08 PROCEDURE — 74011250637 HC RX REV CODE- 250/637: Performed by: SPECIALIST

## 2018-02-08 PROCEDURE — 77030013992 HC SNR POLYP ENDOSC BSC -B: Performed by: SPECIALIST

## 2018-02-08 PROCEDURE — 76060000031 HC ANESTHESIA FIRST 0.5 HR: Performed by: SPECIALIST

## 2018-02-08 PROCEDURE — 76040000019: Performed by: SPECIALIST

## 2018-02-08 PROCEDURE — 77030009426 HC FCPS BIOP ENDOSC BSC -B: Performed by: SPECIALIST

## 2018-02-08 PROCEDURE — 74011250636 HC RX REV CODE- 250/636

## 2018-02-08 PROCEDURE — 74011000250 HC RX REV CODE- 250

## 2018-02-08 PROCEDURE — 88305 TISSUE EXAM BY PATHOLOGIST: CPT | Performed by: SPECIALIST

## 2018-02-08 RX ORDER — DEXTROMETHORPHAN/PSEUDOEPHED 2.5-7.5/.8
1.2 DROPS ORAL
Status: DISCONTINUED | OUTPATIENT
Start: 2018-02-08 | End: 2018-02-08 | Stop reason: HOSPADM

## 2018-02-08 RX ORDER — MIDAZOLAM HYDROCHLORIDE 1 MG/ML
.25-5 INJECTION, SOLUTION INTRAMUSCULAR; INTRAVENOUS
Status: DISCONTINUED | OUTPATIENT
Start: 2018-02-08 | End: 2018-02-08 | Stop reason: HOSPADM

## 2018-02-08 RX ORDER — SODIUM CHLORIDE 0.9 % (FLUSH) 0.9 %
5-10 SYRINGE (ML) INJECTION AS NEEDED
Status: DISCONTINUED | OUTPATIENT
Start: 2018-02-08 | End: 2018-02-08 | Stop reason: HOSPADM

## 2018-02-08 RX ORDER — SODIUM CHLORIDE 9 MG/ML
100 INJECTION, SOLUTION INTRAVENOUS CONTINUOUS
Status: DISCONTINUED | OUTPATIENT
Start: 2018-02-08 | End: 2018-02-08 | Stop reason: HOSPADM

## 2018-02-08 RX ORDER — FLUMAZENIL 0.1 MG/ML
0.2 INJECTION INTRAVENOUS
Status: DISCONTINUED | OUTPATIENT
Start: 2018-02-08 | End: 2018-02-08 | Stop reason: HOSPADM

## 2018-02-08 RX ORDER — EPINEPHRINE 0.1 MG/ML
1 INJECTION INTRACARDIAC; INTRAVENOUS
Status: DISCONTINUED | OUTPATIENT
Start: 2018-02-08 | End: 2018-02-08 | Stop reason: HOSPADM

## 2018-02-08 RX ORDER — PROPOFOL 10 MG/ML
INJECTION, EMULSION INTRAVENOUS AS NEEDED
Status: DISCONTINUED | OUTPATIENT
Start: 2018-02-08 | End: 2018-02-08 | Stop reason: HOSPADM

## 2018-02-08 RX ORDER — SODIUM CHLORIDE 0.9 % (FLUSH) 0.9 %
5-10 SYRINGE (ML) INJECTION EVERY 8 HOURS
Status: DISCONTINUED | OUTPATIENT
Start: 2018-02-08 | End: 2018-02-08 | Stop reason: HOSPADM

## 2018-02-08 RX ORDER — NALOXONE HYDROCHLORIDE 0.4 MG/ML
0.4 INJECTION, SOLUTION INTRAMUSCULAR; INTRAVENOUS; SUBCUTANEOUS
Status: DISCONTINUED | OUTPATIENT
Start: 2018-02-08 | End: 2018-02-08 | Stop reason: HOSPADM

## 2018-02-08 RX ORDER — FENTANYL CITRATE 50 UG/ML
25 INJECTION, SOLUTION INTRAMUSCULAR; INTRAVENOUS
Status: DISCONTINUED | OUTPATIENT
Start: 2018-02-08 | End: 2018-02-08 | Stop reason: HOSPADM

## 2018-02-08 RX ORDER — ATROPINE SULFATE 0.1 MG/ML
0.5 INJECTION INTRAVENOUS
Status: DISCONTINUED | OUTPATIENT
Start: 2018-02-08 | End: 2018-02-08 | Stop reason: HOSPADM

## 2018-02-08 RX ORDER — MIDAZOLAM HYDROCHLORIDE 1 MG/ML
1-2 INJECTION, SOLUTION INTRAMUSCULAR; INTRAVENOUS
Status: DISCONTINUED | OUTPATIENT
Start: 2018-02-08 | End: 2018-02-08 | Stop reason: HOSPADM

## 2018-02-08 RX ORDER — LIDOCAINE HYDROCHLORIDE 20 MG/ML
INJECTION, SOLUTION EPIDURAL; INFILTRATION; INTRACAUDAL; PERINEURAL AS NEEDED
Status: DISCONTINUED | OUTPATIENT
Start: 2018-02-08 | End: 2018-02-08 | Stop reason: HOSPADM

## 2018-02-08 RX ORDER — SODIUM CHLORIDE 9 MG/ML
INJECTION, SOLUTION INTRAVENOUS
Status: DISCONTINUED | OUTPATIENT
Start: 2018-02-08 | End: 2018-02-08 | Stop reason: HOSPADM

## 2018-02-08 RX ADMIN — PROPOFOL 50 MG: 10 INJECTION, EMULSION INTRAVENOUS at 09:30

## 2018-02-08 RX ADMIN — PROPOFOL 20 MG: 10 INJECTION, EMULSION INTRAVENOUS at 09:37

## 2018-02-08 RX ADMIN — SIMETHICONE 80 MG: 20 SUSPENSION/ DROPS ORAL at 09:35

## 2018-02-08 RX ADMIN — LIDOCAINE HYDROCHLORIDE 60 MG: 20 INJECTION, SOLUTION EPIDURAL; INFILTRATION; INTRACAUDAL; PERINEURAL at 09:22

## 2018-02-08 RX ADMIN — PROPOFOL 50 MG: 10 INJECTION, EMULSION INTRAVENOUS at 09:24

## 2018-02-08 RX ADMIN — SODIUM CHLORIDE: 9 INJECTION, SOLUTION INTRAVENOUS at 09:11

## 2018-02-08 RX ADMIN — PROPOFOL 50 MG: 10 INJECTION, EMULSION INTRAVENOUS at 09:22

## 2018-02-08 RX ADMIN — PROPOFOL 20 MG: 10 INJECTION, EMULSION INTRAVENOUS at 09:33

## 2018-02-08 RX ADMIN — PROPOFOL 50 MG: 10 INJECTION, EMULSION INTRAVENOUS at 09:27

## 2018-02-08 RX ADMIN — SODIUM CHLORIDE 100 ML/HR: 900 INJECTION, SOLUTION INTRAVENOUS at 08:33

## 2018-02-08 NOTE — IP AVS SNAPSHOT
[address] [facility] 
[phone] Patient: Bernardino Calderon MRN: [id number] OAK:[birth year] About your hospitalization You were admitted on:  February 8, 2018 You last received care in the:  [facility] END[facility] You were discharged on:  February 8, 2018 Why you were hospitalized Your primary diagnosis was:  Not on File Your diagnoses also included:  History Of Colonic Polyps Follow-up Information Follow up With Details Comments Contact Info Ole Lloyd MD   . [address] [place] [address] 
[phone] Your Scheduled Appointments Monday February 26, 2018 11:45 AM EST  
ROUTINE CARE with Ole Lloyd MD  
[address]  
[phone] Discharge Orders None A check trang indicates which time of day the medication should be taken. My Medications CONTINUE taking these medications Instructions Each Dose to Equal  
 Morning Noon Evening Bedtime  
 amLODIPine 10 mg tablet Commonly known as:  Deborha Cords Your last dose was: Your next dose is: TAKE 1 TABLET EVERY DAY  
     
   
   
   
  
 aspirin 81 mg chewable tablet Your last dose was: Your next dose is: Take 1 Tab by mouth daily. 81 mg  
    
   
   
   
  
 atorvastatin 10 mg tablet Commonly known as:  LIPITOR Your last dose was: Your next dose is: TAKE 1 TABLET  NIGHTLY CENTRUM SILVER Tab tablet Generic drug:  multivitamins-minerals-lutein Your last dose was: Your next dose is: Take 1 Tab by mouth daily. 1 Tab  
    
   
   
   
  
 raNITIdine 150 mg tablet Commonly known as:  ZANTAC Your last dose was: Your next dose is: Take 150 mg by mouth daily. Indications: GASTROESOPHAGEAL REFLUX  
 150 mg  
    
   
   
   
  
 SYMBICORT 160-4.5 mcg/actuation Hfaa Generic drug:  budesonide-formoterol Your last dose was: Your next dose is:    
   
   
 INHALE 1 PUFF TWICE DAILY  
     
   
   
   
  
 TYLENOL EXTRA STRENGTH 500 mg tablet Generic drug:  acetaminophen Your last dose was: Your next dose is: Take 1,000 mg by mouth every six (6) hours as needed. 1000 mg  
    
   
   
   
  
 VITAMIN B-12 1,000 mcg tablet Generic drug:  cyanocobalamin Your last dose was: Your next dose is: Take 2,500 mcg by mouth daily. 2500 mcg Discharge Instructions Allison Delgado [id number] 
1945 Procedure  Discharge Instructions:   
 
Discomfort: 
Redness at IV site- apply warm compress to area; if redness or soreness persist- contact your physician There may be a slight amount of blood passed from the rectum Gaseous discomfort- walking, belching will help relieve any discomfort You may not operate a vehicle for 12 hours You may not engage in an occupation involving machinery or appliances for rest of today You may not drink alcoholic beverages for at least 12 hours Avoid making any critical decisions for at least 24 hour DIET: 
 You may resume your normal diet today. You should not overeat or \"feast\" today as your abdomen may become distended or uncomfortable. MEDICATIONS: 
 I reconciled this list from the list you gave us when you came today for the procedure. Please clarify with me, your primary care physician and the nurse who is discharging you if we have any discrepancies. Aspirin and or non-steroidal medication (Ibuprofen, Motrin, naproxen, etc.) is ok in limited quantities.    
  
 
ACTIVITY: 
You may resume your normal daily activities it is recommended that you spend the remainder of the day resting -  avoid any strenuous activity. CALL M.D. ANY SIGN OF: Increasing pain, nausea, vomiting Abdominal distension (swelling) New increased bleeding (oral or rectal) Fever (chills) Follow-up Instructions: 
 Call Dr. Bhumika Garcia for the results of  biopsy in approximately one week Telephone #  [phone] Follow up visit as needed; recall 5 years Ewa Jimenez MD 
9:47 AM 
2018 WIB Activation Thank you for requesting access to WIB. Please follow the instructions below to securely access and download your online medical record. WIB allows you to send messages to your doctor, view your test results, renew your prescriptions, schedule appointments, and more. How Do I Sign Up? 1. In your internet browser, go to [email or web address] 
2. Click on the First Time User? Click Here link in the Sign In box. You will be redirect to the New Member Sign Up page. 3. Enter your WIB Access Code exactly as it appears below. You will not need to use this code after youve completed the sign-up process. If you do not sign up before the expiration date, you must request a new code. WIB Access Code: [email or web address] Expires: 2018  7:05 AM (This is the date your WIB access code will ) 4. Enter the last four digits of your Social Security Number (xxxx) and Date of Birth (mm/dd/yyyy) as indicated and click Submit. You will be taken to the next sign-up page. 5. Create a WIB ID. This will be your WIB login ID and cannot be changed, so think of one that is secure and easy to remember. 6. Create a WIB password. You can change your password at any time. 7. Enter your Password Reset Question and Answer. This can be used at a later time if you forget your password. 8. Enter your e-mail address. You will receive e-mail notification when new information is available in [address] 9. Click Sign Up. You can now view and download portions of your medical record. 10. Click the Download Summary menu link to download a portable copy of your medical information. Additional Information If you have questions, please visit the Frequently Asked Questions section of the [employer] website at [email or web address] Remember, [employer] is NOT to be used for urgent needs. For medical emergencies, dial 911. Introducing [facility]! [facility] introduces [employer] patient portal. Now you can access parts of your medical record, email your doctor's office, and request medication refills online. 1. In your internet browser, go to [email or web address] 2. Click on the First Time User? Click Here link in the Sign In box. You will see the New Member Sign Up page. 3. Enter your [employer] Access Code exactly as it appears below. You will not need to use this code after youve completed the sign-up process. If you do not sign up before the expiration date, you must request a new code. · [employer] Access Code: [facility] Expires: 5/9/2018  7:05 AM 
 
4. Enter the last four digits of your Social Security Number (xxxx) and Date of Birth (mm/dd/yyyy) as indicated and click Submit. You will be taken to the next sign-up page. 5. Create a Element [employer]ot ID. This will be your [employer] login ID and cannot be changed, so think of one that is secure and easy to remember. 6. Create a [employer] password. You can change your password at any time. 7. Enter your Password Reset Question and Answer. This can be used at a later time if you forget your password. 8. Enter your e-mail address. You will receive e-mail notification when new information is available in [address] 9. Click Sign Up. You can now view and download portions of your medical record. 10. Click the Download Summary menu link to download a portable copy of your medical information. If you have questions, please visit the Frequently Asked Questions section of the MyChart website. Remember, MyChart is NOT to be used for urgent needs. For medical emergencies, dial 911. Now available from your iPhone and Android! Providers Seen During Your Hospitalization Provider Specialty Primary office phone Yony Lainez MD Gastroenterology [phone] Your Primary Care Physician (PCP) Primary Care Physician Office Phone Office Fax Mateo France [phone] You are allergic to the following Allergen Reactions Lisinopril Angioedema Entire face swells Recent Documentation Height Weight BMI Smoking Status 1.803 m 68.2 kg 20.97 kg/m2 Former Smoker Emergency Contacts Name Discharge Info Relation Home Work Mobile 46 Jackelyn Garcia CAREGIVER [3] Spouse [3] [phone] Denae Hollis DISCHARGE CAREGIVER [3] Child [2] [phone] Patient Belongings The following personal items are in your possession at time of discharge: 
  Dental Appliances: None  Visual Aid: None Please provide this summary of care documentation to your next provider. Signatures-by signing, you are acknowledging that this After Visit Summary has been reviewed with you and you have received a copy. Patient Signature:  ____________________________________________________________ Date:  ____________________________________________________________  
  
Coleen Marie Provider Signature:  ____________________________________________________________ Date:  ____________________________________________________________

## 2018-02-08 NOTE — ANESTHESIA PREPROCEDURE EVALUATION
Anesthetic History   No history of anesthetic complications            Review of Systems / Medical History  Patient summary reviewed, nursing notes reviewed and pertinent labs reviewed    Pulmonary    COPD: mild               Neuro/Psych   Within defined limits           Cardiovascular    Hypertension              Exercise tolerance: >4 METS     GI/Hepatic/Renal     GERD           Endo/Other  Within defined limits           Other Findings              Physical Exam    Airway  Mallampati: II  TM Distance: 4 - 6 cm  Neck ROM: normal range of motion   Mouth opening: Normal     Cardiovascular  Regular rate and rhythm,  S1 and S2 normal,  no murmur, click, rub, or gallop             Dental    Dentition: Full upper dentures     Pulmonary  Breath sounds clear to auscultation               Abdominal  GI exam deferred       Other Findings            Anesthetic Plan    ASA: 2  Anesthesia type: total IV anesthesia and MAC          Induction: Intravenous  Anesthetic plan and risks discussed with: Patient

## 2018-02-08 NOTE — ANESTHESIA POSTPROCEDURE EVALUATION
Post-Anesthesia Evaluation and Assessment    Patient: Kassie Burnette MRN: [id number]  SSN: [id number]    YOB: 1945  Age: 67 y.o. Sex: male       Cardiovascular Function/Vital Signs  Visit Vitals    /89    Pulse 71    Temp 36.8 °C (98.2 °F)    Resp 11    Ht 5' 11\" (1.803 m)    Wt 68.2 kg (150 lb 6 oz)    SpO2 97%    BMI 20.97 kg/m2       Patient is status post total IV anesthesia, MAC anesthesia for Procedure(s):  COLONOSCOPY  ENDOSCOPIC POLYPECTOMY. Nausea/Vomiting: None    Postoperative hydration reviewed and adequate. Pain:  Pain Scale 1: Numeric (0 - 10) (02/08/18 1010)  Pain Intensity 1: 0 (02/08/18 1010)   Managed    Neurological Status: At baseline    Mental Status and Level of Consciousness: Arousable    Pulmonary Status:   O2 Device: Room air (02/08/18 1010)   Adequate oxygenation and airway patent    Complications related to anesthesia: None    Post-anesthesia assessment completed.  No concerns    Signed By: Nick Garcia MD     February 8, 2018

## 2018-02-08 NOTE — PROCEDURES
Colonoscopy    Indications: history colon polyps    Pre-operative Diagnosis: see above    Medications:  See anesthesia form    Post-operative Diagnosis:  Descending polyp      Procedure Details   Prior to the procedure its objectives, risks, consequences and alternatives were discussed with the patient who then elected to proceed. All questions were answered. Digital Rectal Exam:  was normal     The Olympus videocolonoscope was inserted in the rectum and advanced to the cecum. The cecum was identified by typical landmarks. The colonoscope was slowly and carefully withdrawn as the mucosa was inspected. At the proximal descending colon a six mm polyp was cold snared. I removed remnant tissue with cold forceps. No other abnormalities were noted. Retroflexion in the rectum was negative. Photos to document the ileocecal valve, appendiceal orifice and retroflexion exam were obtained. The preparation was adequate      Estimated Blood Loss:  none    Specimens:  Descending polyp    Findings:  One polyp    Complications:  none    Repeat colonoscopy is recommended in:  Five years.                Ellie Vickers MD  9:42 AM  2/8/2018

## 2018-02-08 NOTE — DISCHARGE INSTRUCTIONS
Desire Atkinson  [id number]  1945              Procedure  Discharge Instructions:      Discomfort:  Redness at IV site- apply warm compress to area; if redness or soreness persist- contact your physician  There may be a slight amount of blood passed from the rectum  Gaseous discomfort- walking, belching will help relieve any discomfort  You may not operate a vehicle for 12 hours  You may not engage in an occupation involving machinery or appliances for rest of today  You may not drink alcoholic beverages for at least 12 hours  Avoid making any critical decisions for at least 24 hour  DIET:   You may resume your normal diet today. You should not overeat or \"feast\" today as your abdomen may become distended or uncomfortable. MEDICATIONS:   I reconciled this list from the list you gave us when you came today for the procedure. Please clarify with me, your primary care physician and the nurse who is discharging you if we have any discrepancies. Aspirin and or non-steroidal medication (Ibuprofen, Motrin, naproxen, etc.) is ok in limited quantities. ACTIVITY:  You may resume your normal daily activities it is recommended that you spend the remainder of the day resting -  avoid any strenuous activity. CALL M.D. ANY SIGN OF:  Increasing pain, nausea, vomiting  Abdominal distension (swelling)  New increased bleeding (oral or rectal)  Fever (chills)        Follow-up Instructions:   Call Dr. Marixa Herrera for the results of  biopsy in approximately one week  Telephone #  [phone]  Follow up visit as needed; recall 5 years  Kandy Marrufo MD  9:47 AM  2/8/2018   [employer] Activation    Thank you for requesting access to [address] [place] [address]. Please follow the instructions below to securely access and download your online medical record. SVXR allows you to send messages to your doctor, view your test results, renew your prescriptions, schedule appointments, and more. How Do I Sign Up? 1.  In your internet browser, go to [email or web address]  2. Click on the First Time User? Click Here link in the Sign In box. You will be redirect to the New Member Sign Up page. 3. Enter your PeopLease Access Code exactly as it appears below. You will not need to use this code after youve completed the sign-up process. If you do not sign up before the expiration date, you must request a new code. PeopLease Access Code: [email or web address]  Expires: 2018  7:05 AM (This is the date your PeopLease access code will )    4. Enter the last four digits of your Social Security Number (xxxx) and Date of Birth (mm/dd/yyyy) as indicated and click Submit. You will be taken to the next sign-up page. 5. Create a PeopLease ID. This will be your PeopLease login ID and cannot be changed, so think of one that is secure and easy to remember. 6. Create a PeopLease password. You can change your password at any time. 7. Enter your Password Reset Question and Answer. This can be used at a later time if you forget your password. 8. Enter your e-mail address. You will receive e-mail notification when new information is available in [address] 9. Click Sign Up. You can now view and download portions of your medical record. 10. Click the Download Summary menu link to download a portable copy of your medical information. Additional Information    If you have questions, please visit the Frequently Asked Questions section of the PeopLease website at [email or web address] Remember, PeopLease is NOT to be used for urgent needs. For medical emergencies, dial 911.

## 2018-02-08 NOTE — ROUTINE PROCESS
Abdiel Medina  1945  [id number]    Situation:  Verbal report received from: Zev Smith RN  Procedure: Procedure(s):  COLONOSCOPY  ENDOSCOPIC POLYPECTOMY    Background:    Preoperative diagnosis: BENIGN ADENOMATOUS NEOPLASM  Postoperative diagnosis: polyp    :  Dr. Megan Garcias  Assistant(s): Endoscopy Technician-1: Pasha Gacria  Endoscopy RN-1: Shyla Hensley RN    Specimens:   ID Type Source Tests Collected by Time Destination   1 : descending colon polyp Preservative Colon, Descending  Jc Alberto MD 2/8/2018 0935 Pathology     H. Pylori  no    Assessment:  Intra-procedure medications   Anesthesia gave intra-procedure sedation and medications, see anesthesia flow sheet     Intravenous fluids: NS@ KVO     Vital signs stable     Abdominal assessment: round and soft     Recommendation:  Discharge patient per MD order.   Family or Friend   Permission to share finding with family or friend yes

## 2018-02-08 NOTE — PERIOP NOTES
Anesthesia reports 240mg Propofol, 50mg Lidocaine and 700mL NS given during procedure. Received report from anesthesia staff on vital signs and status of patient.

## 2018-02-08 NOTE — H&P
Pre-endoscopy H and P    The patient was seen and examined in the endoscopy suite. The airway was assessed and docuemented. The problem list, past medical history, and medications were reviewed. 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 Social History Main Topics    Smoking status: Former Smoker     Packs/day: 1.50     Years: 45.00     Quit date: 8/2/2012    Smokeless tobacco: Never Used    Alcohol use No    Drug use: No    Sexual activity: No     Other Topics Concern    Not on file     Social History Narrative     Past Medical History:   Diagnosis Date    Cancer [facility]) 2012    right lung SCC stage I    COPD     SIRS    Dyspepsia and other specified disorders of function of stomach     Essential hypertension     GERD (gastroesophageal reflux disease)     Hypercholesterolemia     Hyperlipidemia     Prediabetes 8/26/2015    Pseudogout 8/6/2012     The patient has a family history of negative for polyps and for cancer. Prior to Admission Medications   Prescriptions Last Dose Informant Patient Reported? Taking?    SYMBICORT 160-4.5 mcg/actuation HFA inhaler 2/7/2018 at Unknown time  No Yes   Sig: INHALE 1 PUFF TWICE DAILY   acetaminophen (TYLENOL EXTRA STRENGTH) 500 mg tablet 2/1/2018 at Unknown time  Yes Yes   Sig: Take 1,000 mg by mouth every six (6) hours as needed. amLODIPine (NORVASC) 10 mg tablet 2/6/2018  No Yes   Sig: TAKE 1 TABLET EVERY DAY   aspirin 81 mg chewable tablet 2/5/2018  No Yes   Sig: Take 1 Tab by mouth daily. atorvastatin (LIPITOR) 10 mg tablet 2/5/2018  No Yes   Sig: TAKE 1 TABLET  NIGHTLY   cyanocobalamin (VITAMIN B-12) 1,000 mcg tablet 1/8/2018 at Unknown time  Yes Yes   Sig: Take 2,500 mcg by mouth daily. multivitamins-minerals-lutein (CENTRUM SILVER) Tab 2/5/2018  Yes Yes   Sig: Take 1 Tab by mouth daily. ranitidine (ZANTAC) 150 mg tablet 2/1/2018 at Unknown time  Yes Yes   Sig: Take 150 mg by mouth daily. Indications: GASTROESOPHAGEAL REFLUX      Facility-Administered Medications: None           The review of systems is:  negative for shortness of breath or chest pain      The heart, lungs, and mental status were satisfactory for the administration of anesthesia sedation and for the procedure. I discussed with the patient the objectives, risks, consequences and alternatives to the procedure.       Kandy Marrufo MD  2/8/2018  9:19 AM

## 2018-02-26 ENCOUNTER — OFFICE VISIT (OUTPATIENT)
Dept: INTERNAL MEDICINE CLINIC | Age: 73
End: 2018-02-26

## 2018-02-26 VITALS
TEMPERATURE: 97.9 F | SYSTOLIC BLOOD PRESSURE: 120 MMHG | RESPIRATION RATE: 16 BRPM | HEIGHT: 71 IN | WEIGHT: 152.8 LBS | HEART RATE: 81 BPM | BODY MASS INDEX: 21.39 KG/M2 | DIASTOLIC BLOOD PRESSURE: 74 MMHG | OXYGEN SATURATION: 95 %

## 2018-02-26 DIAGNOSIS — E78.00 HYPERCHOLESTEROLEMIA: ICD-10-CM

## 2018-02-26 DIAGNOSIS — R35.0 FREQUENCY OF MICTURITION: ICD-10-CM

## 2018-02-26 DIAGNOSIS — I10 ESSENTIAL HYPERTENSION: ICD-10-CM

## 2018-02-26 DIAGNOSIS — J44.9 CHRONIC OBSTRUCTIVE PULMONARY DISEASE, UNSPECIFIED COPD TYPE (HCC): ICD-10-CM

## 2018-02-26 DIAGNOSIS — Z13.31 SCREENING FOR DEPRESSION: ICD-10-CM

## 2018-02-26 DIAGNOSIS — Z00.00 MEDICARE ANNUAL WELLNESS VISIT, SUBSEQUENT: Primary | ICD-10-CM

## 2018-02-26 DIAGNOSIS — Z13.39 SCREENING FOR ALCOHOLISM: ICD-10-CM

## 2018-02-26 DIAGNOSIS — Z85.9 HISTORY OF CANCER: ICD-10-CM

## 2018-02-26 DIAGNOSIS — R73.09 ELEVATED HEMOGLOBIN A1C: ICD-10-CM

## 2018-02-26 DIAGNOSIS — Z12.5 SPECIAL SCREENING FOR MALIGNANT NEOPLASM OF PROSTATE: ICD-10-CM

## 2018-02-26 DIAGNOSIS — R73.03 PREDIABETES: ICD-10-CM

## 2018-02-26 RX ORDER — BUDESONIDE AND FORMOTEROL FUMARATE DIHYDRATE 160; 4.5 UG/1; UG/1
AEROSOL RESPIRATORY (INHALATION)
Qty: 2 INHALER | Refills: 5 | Status: SHIPPED | OUTPATIENT
Start: 2018-02-26 | End: 2019-08-06 | Stop reason: SDUPTHER

## 2018-02-26 NOTE — PROGRESS NOTES
This is a Subsequent Medicare Annual Wellness Exam (AWV) (Performed 12 months after IPPE or effective date of Medicare Part B enrollment)    I have reviewed the patient's medical history in detail and updated the computerized patient record. History     Past Medical History:   Diagnosis Date    Cancer [facility]) 2012    right lung SCC stage I    COPD     SIRS    Dyspepsia and other specified disorders of function of stomach     Essential hypertension     GERD (gastroesophageal reflux disease)     Hypercholesterolemia     Hyperlipidemia     Prediabetes 8/26/2015    Pseudogout 8/6/2012      Past Surgical History:   Procedure Laterality Date    CHEST SURGERY PROCEDURE UNLISTED      see history under other below    COLONOSCOPY N/A 2/8/2018    COLONOSCOPY performed by Melinda Kern MD at [address] [place] [address]  2/8/2018         [address]  2011    gene.  HX HERNIA REPAIR  5/20/13     BILATERAL OPEN INGUINAL HERNIA REPAIR WITH MESH    HX OTHER SURGICAL  10 26 12    EBUS    HX OTHER SURGICAL  11/13/12    BRONCHSCOPY,RIGHT THORACOSCOPY,THORACOSCOPIC RIGHT UPPER LUNG  LOBE RESECTION, MEDIASTINAL LYMPH NODE DISSECTION; INTERCOSTAL NERVE BLOCK     Current Outpatient Prescriptions   Medication Sig Dispense Refill    budesonide-formoterol (SYMBICORT) 160-4.5 mcg/actuation HFAA INHALE 1 PUFF TWICE DAILY 2 Inhaler 5    amLODIPine (NORVASC) 10 mg tablet TAKE 1 TABLET EVERY DAY 90 Tab 1    atorvastatin (LIPITOR) 10 mg tablet TAKE 1 TABLET  NIGHTLY 90 Tab 3    cyanocobalamin (VITAMIN B-12) 1,000 mcg tablet Take 2,500 mcg by mouth daily.  ranitidine (ZANTAC) 150 mg tablet Take 150 mg by mouth daily. Indications: GASTROESOPHAGEAL REFLUX      multivitamins-minerals-lutein (CENTRUM SILVER) Tab Take 1 Tab by mouth daily.  acetaminophen (TYLENOL EXTRA STRENGTH) 500 mg tablet Take 1,000 mg by mouth every six (6) hours as needed.       aspirin 81 mg chewable tablet Take 1 Tab by mouth daily. 30 Tab 1     Allergies   Allergen Reactions    Lisinopril Angioedema     Entire face swells     Family History   Problem Relation Age of Onset    Dementia Mother     Cancer Father      lung    Cancer Brother      throat     Social History   Substance Use Topics    Smoking status: Former Smoker     Packs/day: 1.50     Years: 45.00     Quit date: 8/2/2012    Smokeless tobacco: Never Used    Alcohol use No     Patient Active Problem List   Diagnosis Code    SIRS (systemic inflammatory response syndrome) ([facility]) R65.10    Cellulitis L03.90    HTN (hypertension) I10    Smoking F17.200    Pseudogout M11.20    History of colonic polyps Z86.010    Squamous cell carcinoma lung ([facility]) C34.90    Hypercholesterolemia E78.00    Inguinal hernia K40.90    Prediabetes R73.03    Combined forms of age-related cataract of right eye H25.811    Combined forms of age-related cataract of left eye H25.812    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 Functional Ability and Level of Safety:   Hearing Loss  Hearing is good. Activities of Daily Living  The home contains: no safety equipment. Patient does total self care    Fall Risk  Fall Risk Assessment, last 12 mths 2/26/2018   Able to walk? Yes   Fall in past 12 months?  No       Abuse Screen  Patient is not abused    Cognitive Screening   Evaluation of Cognitive Function:  Has your family/caregiver stated any concerns about your memory: no  Normal    Patient Care Team   Patient Care Team:  Frank Bedolla MD as PCP - General (Family Practice)  Reynaldo Seaman MD (General Surgery)  Joshua Dawson MD (Gastroenterology)  Mai Soto, MD (Thoracic (non-cardiac) Surgery)  Scott Marsh MD (Hematology and Oncology)  Renato Nissen, RN as Nurse Chester Cosmo Taylor, DO (Ophthalmology)    Assessment/Plan   Education and counseling provided:  Are appropriate based on today's review and evaluation  End-of-Life planning (with patient's consent)  Prostate cancer screening tests (PSA, covered annually)    Diagnoses and all orders for this visit:    1. Medicare annual wellness visit, subsequent    2. Essential hypertension  -     METABOLIC PANEL, COMPREHENSIVE    3. Hypercholesterolemia  -     LIPID PANEL  -     METABOLIC PANEL, COMPREHENSIVE    4. Prediabetes  -     METABOLIC PANEL, COMPREHENSIVE  -     HEMOGLOBIN A1C WITH EAG    5. Chronic obstructive pulmonary disease, unspecified COPD type (HCC)  -     budesonide-formoterol (SYMBICORT) 160-4.5 mcg/actuation HFAA; INHALE 1 PUFF TWICE DAILY    6. Elevated hemoglobin A1c  -     HEMOGLOBIN A1C WITH EAG    7. Screening for alcoholism  -     Annual  Alcohol Screen 15 min ()    8. Screening for depression  -     Depression Screen Annual    9. Special screening for malignant neoplasm of prostate  -     Digital Rectal Exam ()        Health Maintenance Due   Topic Date Due    GLAUCOMA SCREENING Q2Y  03/25/2009    MEDICARE YEARLY EXAM  06/17/2017       Encounter Diagnoses   Name Primary?  Routine general medical examination at a health care facility Yes    Screening for depression        1. ADL's and support. Patient lives independently at home. Lives in an .  States that  has friends and family nearby for support when needed.     ADL Assessment 8/1/2016   Feeding yourself No Help Needed   Getting from bed to chair No Help Needed   Getting dressed No Help Needed   Bathing or showering No Help Needed   Walk across the room (includes cane/walker) No Help Needed   Using the telphone No Help Needed   Taking your medications No Help Needed   Preparing meals No Help Needed   Managing money (expenses/bills) No Help Needed   Moderately strenuous housework (laundry) No Help Needed   Shopping for personal items (toiletries/medicines) No Help Needed   Shopping for groceries No Help Needed   Driving No Help Needed   Climbing a flight of stairs No Help Needed   Getting to places beyond walking distances No Help Needed      2.  Cardiovascular blood tests. Lipid panel ordered today. 3.  Colorectal cancer screening. Last colonoscopy  with Dr. Sheree Ott on 2/8/18. A 5 year follow up colonoscopy was recommended by GI.       4.  Diabetes screening tests. .  Repeat A1c ordered today     5.  Glaucoma screenings.       6. Flu vaccine. Last done fall 2017.     7. Pneumonia vaccine. Declines pneumonia vaccines.     8. PSA- ordered today      9. Advance care planning. Patient does have a ACP. Information was given to patient and his wife.     10. Bone density. N/A        11. Mammography. N/A     12. Other immunizations.      Brief history and med reconciliation completed by MA/LPN.   Reviewed by MD.

## 2018-02-26 NOTE — PATIENT INSTRUCTIONS

## 2018-02-26 NOTE — MR AVS SNAPSHOT
[address] 
[phone] Patient: Arvind Azevedo MRN: [id number] RSI:9/41/2063 Visit Information Date & Time Provider Department Dept. Phone Encounter #  
 2/26/2018 11:45 AM Denise Veliz, [address] [place] [address],[address] [phone] [id number] Follow-up Instructions Return in about 6 months (around 8/26/2018) for follow up htn and prediabetes. Upcoming Health Maintenance Date Due  
 GLAUCOMA SCREENING Q2Y 3/25/2009 MEDICARE YEARLY EXAM 6/17/2017 COLONOSCOPY 2/8/2023 DTaP/Tdap/Td series (2 - Td) 12/16/2026 Allergies as of 2/26/2018  Review Complete On: 2/26/2018 By: Jayde Sue LPN Severity Noted Reaction Type Reactions Lisinopril High 08/06/2012   Systemic Angioedema Entire face swells Current Immunizations  Reviewed on 11/16/2012 Name Date Influenza Vaccine 2/26/2016, 9/23/2014 Pneumococcal Polysaccharide (PPSV-23) 9/23/2014 ZZZ-RETIRED (DO NOT USE) Pneumococcal Vaccine (Unspecified Type) 10/6/2006 Not reviewed this visit You Were Diagnosed With   
  
 Codes Comments Medicare annual wellness visit, subsequent    -  Primary ICD-10-CM: Z00.00 ICD-9-CM: V70.0 Essential hypertension     ICD-10-CM: I10 
ICD-9-CM: 401.9 Hypercholesterolemia     ICD-10-CM: E78.00 ICD-9-CM: 272.0 Prediabetes     ICD-10-CM: R73.03 
ICD-9-CM: 790.29 Chronic obstructive pulmonary disease, unspecified COPD type ([facility]     ICD-10-CM: J44.9 ICD-9-CM: 136 Elevated hemoglobin A1c     ICD-10-CM: R73.09 
ICD-9-CM: 790.29 Screening for alcoholism     ICD-10-CM: Z13.89 ICD-9-CM: V79.1 Screening for depression     ICD-10-CM: Z13.89 ICD-9-CM: V79.0 Special screening for malignant neoplasm of prostate     ICD-10-CM: Z12.5 ICD-9-CM: V76.44 Vitals BP Pulse Temp Resp Height(growth percentile) Weight(growth percentile) 120/74 (BP 1 Location: Left arm, BP Patient Position: Sitting) 81 97.9 °F (36.6 °C) (Oral) 16 5' 11\" (1.803 m) 152 lb 12.8 oz (69.3 kg) SpO2 BMI Smoking Status 95% 21.31 kg/m2 Former Smoker Vitals History BMI and BSA Data Body Mass Index Body Surface Area  
 21.31 kg/m 2 1.86 m 2 Preferred Pharmacy Pharmacy Name Phone [facility] [address] [place], [place] [address] [facility] [address] [place] [address] [phone] Your Updated Medication List  
  
   
This list is accurate as of 2/26/18 11:48 AM.  Always use your most recent med list. amLODIPine 10 mg tablet Commonly known as:  Frann Comfort TAKE 1 TABLET EVERY DAY  
  
 aspirin 81 mg chewable tablet Take 1 Tab by mouth daily. atorvastatin 10 mg tablet Commonly known as:  LIPITOR  
TAKE 1 TABLET  NIGHTLY  
  
 budesonide-formoterol 160-4.5 mcg/actuation Hfaa Commonly known as:  SYMBICORT  
INHALE 1 PUFF TWICE DAILY CENTRUM SILVER Tab tablet Generic drug:  multivitamins-minerals-lutein Take 1 Tab by mouth daily. raNITIdine 150 mg tablet Commonly known as:  ZANTAC Take 150 mg by mouth daily. Indications: GASTROESOPHAGEAL REFLUX  
  
 TYLENOL EXTRA STRENGTH 500 mg tablet Generic drug:  acetaminophen Take 1,000 mg by mouth every six (6) hours as needed. VITAMIN B-12 1,000 mcg tablet Generic drug:  cyanocobalamin Take 2,500 mcg by mouth daily. Prescriptions Sent to Pharmacy Refills  
 budesonide-formoterol (SYMBICORT) 160-4.5 mcg/actuation HFAA 5 Sig: INHALE 1 PUFF TWICE DAILY Class: Normal  
 Pharmacy: [address] [place] [address], [address] Ph #: [phone] We Performed the Following [address] [[id number] [facility]] HEMOGLOBIN A1C WITH EAG [79407 CPT(R)] LIPID PANEL [40721 CPT(R)] METABOLIC PANEL, COMPREHENSIVE [43162 CPT(R)] NM ANNUAL ALCOHOL SCREEN 15 MIN [id number] [facility]] IN PROSTATE CA SCREENING; MISAEL [ [facility]] Follow-up Instructions Return in about 6 months (around 8/26/2018) for follow up htn and prediabetes. Patient Instructions Medicare Wellness Visit, Male The best way to live healthy is to have a healthy lifestyle by eating a well-balanced diet, exercising regularly, limiting alcohol and stopping smoking. Regular physical exams and screening tests are another way to keep healthy. Preventive exams provided by your health care provider can find health problems before they become diseases or illnesses. Preventive services including immunizations, screening tests, monitoring and exams can help you take care of your own health. All people over age 72 should have a pneumovax  and and a prevnar shot to prevent pneumonia. These are once in a lifetime unless you and your provider decide differently. All people over 65 should have a yearly flu shot and a tetanus vaccine every 10 years. Screening for diabetes mellitus with a blood sugar test should be done every year. Glaucoma is a disease of the eye due to increased ocular pressure that can lead to blindness and it should be done every year by an eye professional. 
 
Cardiovascular screening tests that check for elevated lipids (fatty part of blood) which can lead to heart disease and strokes should be done every 5 years. Colorectal screening that evaluates for blood or polyps in your colon should be done yearly as a stool test or every five years as a flexible sigmoidoscope or every 10 years as a colonoscopy up to age 76. Men up to age 76 may need a screening blood test for prostate cancer at certain intervals, depending on their personal and family history. This decision is between the patient and his provider. If you have been a smoker or had family history of abdominal aortic aneurysms, you and your provider may decide to schedule an ultrasound test of your aorta. Hepatitis C screening is also recommended for anyone born between [birth year] through [address]. A shingles vaccine is also recommended once in a lifetime after age 61. Your Medicare Wellness Exam is recommended annually. Here is a list of your current Health Maintenance items with a due date: 
Health Maintenance Due Topic Date Due  Glaucoma Screening   03/25/2009 [facility] Annual Well Visit  06/17/2017 Introducing [facility]! Jessica Vizcarra introduces [employer] patient portal. Now you can access parts of your medical record, email your doctor's office, and request medication refills online. 1. In your internet browser, go to [email or web address] 2. Click on the First Time User? Click Here link in the Sign In box. You will see the New Member Sign Up page. 3. Enter your [employer] Access Code exactly as it appears below. You will not need to use this code after youve completed the sign-up process. If you do not sign up before the expiration date, you must request a new code. · [employer] Access Code: [facility] Expires: 5/9/2018  7:05 AM 
 
4. Enter the last four digits of your Social Security Number (xxxx) and Date of Birth (mm/dd/yyyy) as indicated and click Submit. You will be taken to the next sign-up page. 5. Create a [employer] ID. This will be your [employer] login ID and cannot be changed, so think of one that is secure and easy to remember. 6. Create a [employer] password. You can change your password at any time. 7. Enter your Password Reset Question and Answer. This can be used at a later time if you forget your password. 8. Enter your e-mail address. You will receive e-mail notification when new information is available in [address] 9. Click Sign Up. You can now view and download portions of your medical record. 10. Click the Download Summary menu link to download a portable copy of your medical information. If you have questions, please visit the Frequently Asked Questions section of the [employer] website. Remember, [employer] is NOT to be used for urgent needs. For medical emergencies, dial 911. Now available from your iPhone and Android! Please provide this summary of care documentation to your next provider. Your primary care clinician is listed as Roshan Long. If you have any questions after today's visit, please call [phone].

## 2018-02-26 NOTE — PROGRESS NOTES
SUBJECTIVE:   Mr. Kelsie French is a 67 y.o. male who is here for follow up of routine medical issues. Pt reports he has been doing well. His only compliant is leg and foot cramps that present at night. HTN: Pt is compliant in taking amlodipine. Patient denies chest pain, RODRIGUEZ/SOB, edema, headache, visual changes, dizziness, palpitations or syncope. Pt's BP in the office today was normal at 120/74. Hypercholesteremia: Pt is compliant in taking atorvastatin. Patient denies abdominal pain, nausea, vomiting, diarrhea, arthralgias/myalgias due to the medication.     -Pt is compliant using Symbicort. -Pt was dismissed by his cancer doctor.   -Pt is complaint taking Centrum Silver for Men daily vitamins. Medicare Wellness Visit: Pt denies any changes to their hearing, vision, or memory. Pt reports they feel safe in their home and does not have any safety equipment. PREVENTIVE:   Ophthalmology: current, next due in 2019    At this time, he is otherwise doing well and has brought no other complaints to my attention today. For a list of the medical issues addressed today, see the assessment and plan below. PMH:   Past Medical History:   Diagnosis Date    Cancer [facility]) 2012    right lung SCC stage I    COPD     SIRS    Dyspepsia and other specified disorders of function of stomach     Essential hypertension     GERD (gastroesophageal reflux disease)     Hypercholesterolemia     Hyperlipidemia     Prediabetes 8/26/2015    Pseudogout 8/6/2012     PSH:  has a past surgical history that includes pr chest surgery procedure unlisted; hx other surgical (10 26 12); hx other surgical (11/13/12); hx hernia repair (2011); hx hernia repair (5/20/13); colonoscopy,nell galvez (2/8/2018); and colonoscopy (N/A, 2/8/2018). All: is allergic to lisinopril.    MEDS:   Current Outpatient Prescriptions   Medication Sig    budesonide-formoterol (SYMBICORT) 160-4.5 mcg/actuation HFAA INHALE 1 PUFF TWICE DAILY    amLODIPine (NORVASC) 10 mg tablet TAKE 1 TABLET EVERY DAY    atorvastatin (LIPITOR) 10 mg tablet TAKE 1 TABLET  NIGHTLY    cyanocobalamin (VITAMIN B-12) 1,000 mcg tablet Take 2,500 mcg by mouth daily.  ranitidine (ZANTAC) 150 mg tablet Take 150 mg by mouth daily. Indications: GASTROESOPHAGEAL REFLUX    multivitamins-minerals-lutein (CENTRUM SILVER) Tab Take 1 Tab by mouth daily.  acetaminophen (TYLENOL EXTRA STRENGTH) 500 mg tablet Take 1,000 mg by mouth every six (6) hours as needed.  aspirin 81 mg chewable tablet Take 1 Tab by mouth daily. No current facility-administered medications for this visit. FH: family history includes Cancer in his brother and father; Dementia in his mother. SH:  reports that he quit smoking about 5 years ago. He has a 67.50 pack-year smoking history. He has never used smokeless tobacco. He reports that he does not drink alcohol or use illicit drugs.      Review of Systems - History obtained from the patient  General ROS: no fever, chills, fatigue, body aches  Psychological ROS: no change in anxiety, depression, SI/HI  Ophthalmic ROS: no blurred vision, myopia, double vision  ENT ROS: no dysphagia, otalgia, otorrhea, rhinorrhea, post nasal drip  Respiratory ROS: no cough, shortness of breath, or wheezing  Cardiovascular ROS: no chest pain or dyspnea on exertion  Gastrointestinal ROS: no abdominal pain, change in bowel habits, or black or bloody stools  Genito-Urinary ROS: no frequency, urgency, incontinence, dysuria, hematouria  Musculoskeletal ROS: leg and foot cramps   Neurological ROS: no headaches, dizziness, lightheadedness, tremors, seizures  Dermatological ROS: no rash or lesions    OBJECTIVE:   Vitals:   Visit Vitals    /74 (BP 1 Location: Left arm, BP Patient Position: Sitting)    Pulse 81    Temp 97.9 °F (36.6 °C) (Oral)    Resp 16    Ht 5' 11\" (1.803 m)    Wt 152 lb 12.8 oz (69.3 kg)    SpO2 95%    BMI 21.31 kg/m2      Gen: Pleasant 67 y.o.  male in NAD. HEENT: PERRLA. EOMI. OP moist and pink. Neck: Supple. No LAD. HEART: RRR, No M/G/R.      LUNGS: CTAB No W/R. ABDOMEN: S, NT, ND, BS+. EXTREMITIES: Warm. No C/C/E.    MUSCULOSKELETAL: Normal ROM, muscle strength 5/5 all groups. NEURO: Alert and oriented x 3. Cranial nerves grossly intact. No focal sensory or motor deficits noted. SKIN: Warm. Dry. No rashes or other lesions noted. ASSESSMENT/ PLAN: Diagnoses and all orders for this visit:    1. Medicare annual wellness visit, subsequent    2. Essential hypertension  -     METABOLIC PANEL, COMPREHENSIVE    3. Hypercholesterolemia  -     LIPID PANEL  -     METABOLIC PANEL, COMPREHENSIVE    4. Prediabetes  -     METABOLIC PANEL, COMPREHENSIVE  -     HEMOGLOBIN A1C WITH EAG    5. Chronic obstructive pulmonary disease, unspecified COPD type (HCC)  -     budesonide-formoterol (SYMBICORT) 160-4.5 mcg/actuation HFAA; INHALE 1 PUFF TWICE DAILY    6. Elevated hemoglobin A1c  -     HEMOGLOBIN A1C WITH EAG    7. Screening for alcoholism  -     Annual  Alcohol Screen 15 min ()    8. Screening for depression  -     Depression Screen Annual    9. Special screening for malignant neoplasm of prostate  -     Digital Rectal Exam ()        ICD-10-CM ICD-9-CM    1. Medicare annual wellness visit, subsequent Z00.00 V70.0    2. Essential hypertension X39 857.1 METABOLIC PANEL, COMPREHENSIVE   3. Hypercholesterolemia E78.00 272.0 LIPID PANEL      METABOLIC PANEL, COMPREHENSIVE   4. Prediabetes H54.69 801.12 METABOLIC PANEL, COMPREHENSIVE      HEMOGLOBIN A1C WITH EAG   5. Chronic obstructive pulmonary disease, unspecified COPD type (HCC) J44.9 496 budesonide-formoterol (SYMBICORT) 160-4.5 mcg/actuation HFAA   6. Elevated hemoglobin A1c R73.09 790.29 HEMOGLOBIN A1C WITH EAG   7. Screening for alcoholism Z13.89 V79.1 [place] ANNUAL ALCOHOL SCREEN 15 MIN   8. Screening for depression Z13.89 V79.0 DEPRESSION SCREEN ANNUAL   9.  Special screening for malignant neoplasm of prostate Z12.5 V76.44 [place] PROSTATE CA SCREENING; MISAEL      1. Medicare Annual Wellness Visit  See attached note. 2. Hypertension  BP seems to be well controlled. I recommended continuing current dose of amlodipine, eating a low sodium diet, and increasing exercise. Recheck CMP. 3. Hypercholesteremia   Lipid panel shows cholesterol seems to be suboptimally controlled. I recommended continuing current dose of atorvastatin, eating a low fat diet, and increasing exercise. Recheck lipid panel. 4. Pre DM type 2  I advised pt to avoid sugars and starches, and to increase exercise when possible. Recheck hemoglobin A1c.     5. COPD  I advised pt to continue to use Symbicort (refill given). I encouraged pt to follow up with his pulmonologist.       6. Elevated HgA1c  I advised pt to avoid sugars and starches, and to increase exercise when possible. Recheck hemoglobin A1c.     7. Screening for Alcoholism   Pt denies any consumption of EtOH. 8. Screening for Depression  Pt denies any feelings of depression. 9. Screening for malignant neoplasm of prostate  Recheck PSA. Follow-up Disposition:  Return in about 6 months (around 8/26/2018) for follow up htn and prediabetes. I have reviewed the patient's medications and risks/side effects/benefits were discussed. Diagnosis(-es) explained to patient and questions answered. Literature provided where appropriate.      Written by Dayna Rose, as dictated by Jiles Merlin, MD.

## 2018-02-27 LAB
ALBUMIN SERPL-MCNC: 4.5 G/DL (ref 3.5–4.8)
ALBUMIN/GLOB SERPL: 1.7 {RATIO} (ref 1.2–2.2)
ALP SERPL-CCNC: 10 IU/L (ref 39–117)
ALT SERPL-CCNC: 17 IU/L (ref 0–44)
AST SERPL-CCNC: 22 IU/L (ref 0–40)
BILIRUB SERPL-MCNC: 0.7 MG/DL (ref 0–1.2)
BUN SERPL-MCNC: 13 MG/DL (ref 8–27)
BUN/CREAT SERPL: 14 (ref 10–24)
CALCIUM SERPL-MCNC: 9.5 MG/DL (ref 8.6–10.2)
CHLORIDE SERPL-SCNC: 99 MMOL/L (ref 96–106)
CHOLEST SERPL-MCNC: 202 MG/DL (ref 100–199)
CO2 SERPL-SCNC: 22 MMOL/L (ref 18–29)
CREAT SERPL-MCNC: 0.92 MG/DL (ref 0.76–1.27)
EST. AVERAGE GLUCOSE BLD GHB EST-MCNC: 128 MG/DL
GFR SERPLBLD CREATININE-BSD FMLA CKD-EPI: 82 ML/MIN/1.73
GFR SERPLBLD CREATININE-BSD FMLA CKD-EPI: 95 ML/MIN/1.73
GLOBULIN SER CALC-MCNC: 2.7 G/DL (ref 1.5–4.5)
GLUCOSE SERPL-MCNC: 101 MG/DL (ref 65–99)
HBA1C MFR BLD: 6.1 % (ref 4.8–5.6)
HDLC SERPL-MCNC: 36 MG/DL
LDLC SERPL CALC-MCNC: 137 MG/DL (ref 0–99)
POTASSIUM SERPL-SCNC: 4.4 MMOL/L (ref 3.5–5.2)
PROT SERPL-MCNC: 7.2 G/DL (ref 6–8.5)
PSA SERPL-MCNC: 1 NG/ML (ref 0–4)
SODIUM SERPL-SCNC: 142 MMOL/L (ref 134–144)
TRIGL SERPL-MCNC: 146 MG/DL (ref 0–149)
VLDLC SERPL CALC-MCNC: 29 MG/DL (ref 5–40)

## 2018-03-03 NOTE — PROGRESS NOTES
Lipids-Your current lab results reveal a elevated total cholesterol and ldl. Your total cholesterol should be under 200 and your ldl under 100. Work on following a low fat diet and exercise at least three times a week. A1c-Your current hgbA1c is higher than your last level of 5.9. Work on following a diabetic diet and exercise. Recheck this test: hgbA1c  in  3 months. Continue with current  medications. CMP-Normal electrolyte levels except for a elevation in glucose levels, normal renal, and liver function.

## 2018-03-05 ENCOUNTER — TELEPHONE (OUTPATIENT)
Dept: INTERNAL MEDICINE CLINIC | Age: 73
End: 2018-03-05

## 2018-06-27 DIAGNOSIS — E78.00 HYPERCHOLESTEROLEMIA: ICD-10-CM

## 2018-06-27 RX ORDER — AMLODIPINE BESYLATE 10 MG/1
TABLET ORAL
Qty: 90 TAB | Refills: 1 | Status: SHIPPED | OUTPATIENT
Start: 2018-06-27 | End: 2018-12-26 | Stop reason: SDUPTHER

## 2018-06-27 RX ORDER — ATORVASTATIN CALCIUM 10 MG/1
TABLET, FILM COATED ORAL
Qty: 90 TAB | Refills: 3 | Status: SHIPPED | OUTPATIENT
Start: 2018-06-27 | End: 2019-03-13 | Stop reason: SDUPTHER

## 2018-08-27 ENCOUNTER — OFFICE VISIT (OUTPATIENT)
Dept: INTERNAL MEDICINE CLINIC | Age: 73
End: 2018-08-27

## 2018-08-27 VITALS
WEIGHT: 158 LBS | HEIGHT: 71 IN | OXYGEN SATURATION: 96 % | RESPIRATION RATE: 16 BRPM | BODY MASS INDEX: 22.12 KG/M2 | DIASTOLIC BLOOD PRESSURE: 83 MMHG | SYSTOLIC BLOOD PRESSURE: 156 MMHG

## 2018-08-27 DIAGNOSIS — E78.00 HYPERCHOLESTEROLEMIA: ICD-10-CM

## 2018-08-27 DIAGNOSIS — R73.09 ELEVATED HEMOGLOBIN A1C: ICD-10-CM

## 2018-08-27 DIAGNOSIS — I10 ESSENTIAL HYPERTENSION: Primary | ICD-10-CM

## 2018-08-27 DIAGNOSIS — M62.838 NIGHT MUSCLE SPASMS: ICD-10-CM

## 2018-08-27 RX ORDER — TIZANIDINE 2 MG/1
2 TABLET ORAL DAILY
Qty: 30 TAB | Refills: 1 | Status: SHIPPED | OUTPATIENT
Start: 2018-08-27

## 2018-08-27 NOTE — MR AVS SNAPSHOT
[address] [facility] [address] [place] [address] [facility] 
[phone] Patient: Luis Santos MRN: [id number] RMB:[id number] Visit Information Date & Time Provider Department Dept. Phone Encounter #  
 8/27/2018 11:45 AM Christofer Levi, [address] [id number] Upcoming Health Maintenance Date Due  
 GLAUCOMA SCREENING Q2Y 3/25/2009 Influenza Age 5 to Adult 8/1/2018 MEDICARE YEARLY EXAM 2/27/2019 COLONOSCOPY 2/8/2023 DTaP/Tdap/Td series (2 - Td) 12/16/2026 Allergies as of 8/27/2018  Review Complete On: 8/27/2018 By: Christofer Leiv MD  
  
 Severity Noted Reaction Type Reactions Lisinopril High 08/06/2012   Systemic Angioedema Entire face swells Current Immunizations  Reviewed on 11/16/2012 Name Date Influenza Vaccine 2/26/2016, 9/23/2014 Pneumococcal Polysaccharide (PPSV-23) 9/23/2014 ZZZ-RETIRED (DO NOT USE) Pneumococcal Vaccine (Unspecified Type) 10/6/2006 Not reviewed this visit You Were Diagnosed With   
  
 Codes Comments Essential hypertension    -  Primary ICD-10-CM: I10 
ICD-9-CM: 401.9 Hypercholesterolemia     ICD-10-CM: E78.00 ICD-9-CM: 272.0 Elevated hemoglobin A1c     ICD-10-CM: R73.09 
ICD-9-CM: 790.29 Night muscle spasms     ICD-10-CM: I82.580 ICD-9-CM: 728.85 Vitals BP Resp Height(growth percentile) Weight(growth percentile) SpO2 BMI  
 156/83 16 5' 11\" (1.803 m) 158 lb (71.7 kg) 96% 22.04 kg/m2 Smoking Status Former Smoker BMI and BSA Data Body Mass Index Body Surface Area 22.04 kg/m 2 1.9 m 2 Preferred Pharmacy Pharmacy Name Phone [address] [phone] Your Updated Medication List  
  
   
This list is accurate as of 8/27/18 12:26 PM.  Always use your most recent med list. amLODIPine 10 mg tablet Commonly known as:  Elisa Croon TAKE 1 TABLET EVERY DAY  
  
 aspirin 81 mg chewable tablet Take 1 Tab by mouth daily. atorvastatin 10 mg tablet Commonly known as:  LIPITOR  
TAKE 1 TABLET  NIGHTLY  
  
 budesonide-formoterol 160-4.5 mcg/actuation Hfaa Commonly known as:  SYMBICORT  
INHALE 1 PUFF TWICE DAILY CENTRUM SILVER Tab tablet Generic drug:  multivitamins-minerals-lutein Take 1 Tab by mouth daily. raNITIdine 150 mg tablet Commonly known as:  ZANTAC Take 150 mg by mouth daily. Indications: GASTROESOPHAGEAL REFLUX  
  
 tiZANidine 2 mg tablet Commonly known as:  Elena Beau Take 1 Tab by mouth daily. TYLENOL EXTRA STRENGTH 500 mg tablet Generic drug:  acetaminophen Take 1,000 mg by mouth every six (6) hours as needed. VITAMIN B-12 1,000 mcg tablet Generic drug:  cyanocobalamin Take 2,500 mcg by mouth daily. Prescriptions Sent to Pharmacy Refills  
 tiZANidine (ZANAFLEX) 2 mg tablet 1 Sig: Take 1 Tab by mouth daily. Class: Normal  
 Pharmacy: [address] [place] [address] #: [phone] Route: Oral  
  
We Performed the Following HEMOGLOBIN A1C WITH EAG [54547 CPT(R)] LIPID PANEL [28459 CPT(R)] METABOLIC PANEL, COMPREHENSIVE [26875 CPT(R)] Introducing [facility]! [employer] introduces [employer] patient portal. Now you can access parts of your medical record, email your doctor's office, and request medication refills online. 1. In your internet browser, go to [email or web address] 2. Click on the First Time User? Click Here link in the Sign In box. You will see the New Member Sign Up page. 3. Enter your [employer] Access Code exactly as it appears below. You will not need to use this code after youve completed the sign-up process. If you do not sign up before the expiration date, you must request a new code. · My Computer Works Access Code: [email or web address] Expires: 11/25/2018 12:24 PM 
 
4. Enter the last four digits of your Social Security Number (xxxx) and Date of Birth (mm/dd/yyyy) as indicated and click Submit. You will be taken to the next sign-up page. 5. Create a My [employer] Works ID. This will be your My [employer] Works login ID and cannot be changed, so think of one that is secure and easy to remember. 6. Create a [employer] password. You can change your password at any time. 7. Enter your Password Reset Question and Answer. This can be used at a later time if you forget your password. 8. Enter your e-mail address. You will receive e-mail notification when new information is available in [address] 9. Click Sign Up. You can now view and download portions of your medical record. 10. Click the Download Summary menu link to download a portable copy of your medical information. If you have questions, please visit the Frequently Asked Questions section of the [employer] website. Remember, [employer] Works is NOT to be used for urgent needs. For medical emergencies, dial 911. Now available from your iPhone and Android! Please provide this summary of care documentation to your next provider. Your primary care clinician is listed as Andrew Lawrence. If you have any questions after today's visit, please call [phone].

## 2018-08-27 NOTE — PROGRESS NOTES
SUBJECTIVE:   Mr. Arleen Montaño is a 75 y.o. male who is here for follow up of routine medical issues. Pt is fasting in the office today. Pt c/o numbness in his hands at night that his alleviated when he stretches his hands behind his head. Pt reports he sleeps on his back or side. Pt reports a similar sensation after cutting the grass with a push-mower. Pt c/o occasional abdnomial cramps. Pt reports similar sx in his dad. Pt endorses eating foods with potassium. Pt's last CMP on 2/26/18 showed normal electrolyte levels. Pt reports an active lifestyle. HTN: Pt is compliant in taking amlodipine. Patient denies chest pain, RODRIGUEZ/SOB, edema, headache, visual changes, dizziness, palpitations or syncope. Pt's BP in the office today was elevated at 156/83. Pt denies checking his BP at home. HLD: Pt is compliant in taking lipitor. Patient denies abdominal pain, nausea, vomiting, diarrhea, arthralgias/myalgias due to the medication. At this time, he is otherwise doing well and has brought no other complaints to my attention today. For a list of the medical issues addressed today, see the assessment and plan below. PMH:   Past Medical History:   Diagnosis Date    Cancer [facility]) 2012    right lung SCC stage I    COPD     SIRS    Dyspepsia and other specified disorders of function of stomach     Essential hypertension     GERD (gastroesophageal reflux disease)     Hypercholesterolemia     Hyperlipidemia     Prediabetes 8/26/2015    Pseudogout 8/6/2012     PSH:  has a past surgical history that includes pr chest surgery procedure unlisted; hx other surgical (10 26 12); hx other surgical (11/13/12); hx hernia repair (2011); hx hernia repair (5/20/13); colonoscopy,leidy camacho,snare (2/8/2018); and colonoscopy (N/A, 2/8/2018). All: is allergic to lisinopril. MEDS:   Current Outpatient Prescriptions   Medication Sig    tiZANidine (ZANAFLEX) 2 mg tablet Take 1 Tab by mouth daily.     atorvastatin (LIPITOR) 10 mg tablet TAKE 1 TABLET  NIGHTLY    amLODIPine (NORVASC) 10 mg tablet TAKE 1 TABLET EVERY DAY    budesonide-formoterol (SYMBICORT) 160-4.5 mcg/actuation HFAA INHALE 1 PUFF TWICE DAILY    ranitidine (ZANTAC) 150 mg tablet Take 150 mg by mouth daily. Indications: GASTROESOPHAGEAL REFLUX    multivitamins-minerals-lutein (CENTRUM SILVER) Tab Take 1 Tab by mouth daily.  acetaminophen (TYLENOL EXTRA STRENGTH) 500 mg tablet Take 1,000 mg by mouth every six (6) hours as needed.  aspirin 81 mg chewable tablet Take 1 Tab by mouth daily.  cyanocobalamin (VITAMIN B-12) 1,000 mcg tablet Take 2,500 mcg by mouth daily. No current facility-administered medications for this visit. FH: family history includes Cancer in his brother and father; Dementia in his mother. SH:  reports that he quit smoking about 6 years ago. He has a 67.50 pack-year smoking history. He has never used smokeless tobacco. He reports that he does not drink alcohol or use illicit drugs.      Review of Systems - History obtained from the patient  General ROS: no fever, chills, fatigue, body aches  Psychological ROS: no change in anxiety, depression, SI/HI  Ophthalmic ROS: no blurred vision, myopia, double vision  ENT ROS: no dysphagia, otalgia, otorrhea, rhinorrhea, post nasal drip  Respiratory ROS: no cough, shortness of breath, or wheezing  Cardiovascular ROS: no chest pain or dyspnea on exertion  Gastrointestinal ROS: abdominal cramping no change in bowel habits, or black or bloody stools  Genito-Urinary ROS: no frequency, urgency, incontinence, dysuria, hematouria  Musculoskeletal ROS: PM numbness in hands no arthralagia, myalgia  Neurological ROS: no headaches, dizziness, lightheadedness, tremors, seizures  Dermatological ROS: no rash or lesions    OBJECTIVE:   Vitals:   Visit Vitals    /83    Resp 16    Ht 5' 11\" (1.803 m)    Wt 158 lb (71.7 kg)    SpO2 96%    BMI 22.04 kg/m2      Gen: Pleasant 75 y.o.  male in NAD. HEENT: PERRLA. EOMI. OP moist and pink. Neck: Supple. No LAD. HEART: RRR, No M/G/R.      LUNGS: CTAB No W/R. ABDOMEN: S, NT, ND, BS+. EXTREMITIES: Warm. No C/C/E.    MUSCULOSKELETAL: Normal ROM, muscle strength 5/5 all groups. NEURO: Alert and oriented x 3. Cranial nerves grossly intact. No focal sensory or motor deficits noted. SKIN: Warm. Dry. No rashes or other lesions noted. ASSESSMENT/ PLAN: Diagnoses and all orders for this visit:    1. Essential hypertension  -     METABOLIC PANEL, COMPREHENSIVE    2. Hypercholesterolemia  -     LIPID PANEL  -     METABOLIC PANEL, COMPREHENSIVE    3. Elevated hemoglobin H5K  -     METABOLIC PANEL, COMPREHENSIVE  -     HEMOGLOBIN A1C WITH EAG    4. Night muscle spasms  -     tiZANidine (ZANAFLEX) 2 mg tablet; Take 1 Tab by mouth daily. ICD-10-CM ICD-9-CM    1. Essential hypertension H47 337.0 METABOLIC PANEL, COMPREHENSIVE   2. Hypercholesterolemia E78.00 272.0 LIPID PANEL      METABOLIC PANEL, COMPREHENSIVE   3. Elevated hemoglobin A1c G12.97 075.07 METABOLIC PANEL, COMPREHENSIVE      HEMOGLOBIN A1C WITH EAG   4. Night muscle spasms M62.838 728.85 tiZANidine (ZANAFLEX) 2 mg tablet      1. Hypertension  I recommended continuing current dose of amlodipine, eating a low sodium diet, and increasing exercise. Recheck CMP. 2. Hyperlipidemia   I recommended continuing current dose of lipitor, eating a low fat diet, and increasing exercise. Recheck lipid panel and CMP. 3. Elevated hemoglobin A1c  I recommended pt avoid sugars and starches and increase exercise. Recheck CMP and A1c.    4. Night muscle spasms  I prescribed zanaflex and advised pt to take the medication in the evening. If symptoms do not improve or worsen, pt may call back or return to office. Follow-up Disposition:  Return in about 6 months (around 2/27/2019) for follow up htn. I have reviewed the patient's medications and risks/side effects/benefits were discussed. Diagnosis(-es) explained to patient and questions answered. Literature provided where appropriate.      Written by Naun Hitchcock, as dictated by Maia Barcenas MD.

## 2018-08-27 NOTE — PROGRESS NOTES
1. Have you been to the ER,No urgent care clinic since your last visit? No Hospitalized since your last visit? NO    2. Have you seen or consulted any other health care providers outside of the [address] [place] [address] since your last visit? Include any pap smears or colon screening. Patient complaining of numbness  In both hands.

## 2018-08-28 LAB
ALBUMIN SERPL-MCNC: 4.7 G/DL (ref 3.5–4.8)
ALBUMIN/GLOB SERPL: 1.7 {RATIO} (ref 1.2–2.2)
ALP SERPL-CCNC: 10 IU/L (ref 39–117)
ALT SERPL-CCNC: 16 IU/L (ref 0–44)
AST SERPL-CCNC: 22 IU/L (ref 0–40)
BILIRUB SERPL-MCNC: 0.7 MG/DL (ref 0–1.2)
BUN SERPL-MCNC: 11 MG/DL (ref 8–27)
BUN/CREAT SERPL: 12 (ref 10–24)
CALCIUM SERPL-MCNC: 10.2 MG/DL (ref 8.6–10.2)
CHLORIDE SERPL-SCNC: 101 MMOL/L (ref 96–106)
CHOLEST SERPL-MCNC: 197 MG/DL (ref 100–199)
CO2 SERPL-SCNC: 25 MMOL/L (ref 20–29)
CREAT SERPL-MCNC: 0.91 MG/DL (ref 0.76–1.27)
EST. AVERAGE GLUCOSE BLD GHB EST-MCNC: 134 MG/DL
GLOBULIN SER CALC-MCNC: 2.8 G/DL (ref 1.5–4.5)
GLUCOSE SERPL-MCNC: 107 MG/DL (ref 65–99)
HBA1C MFR BLD: 6.3 % (ref 4.8–5.6)
HDLC SERPL-MCNC: 42 MG/DL
LDLC SERPL CALC-MCNC: 133 MG/DL (ref 0–99)
POTASSIUM SERPL-SCNC: 5.1 MMOL/L (ref 3.5–5.2)
PROT SERPL-MCNC: 7.5 G/DL (ref 6–8.5)
SODIUM SERPL-SCNC: 144 MMOL/L (ref 134–144)
TRIGL SERPL-MCNC: 109 MG/DL (ref 0–149)
VLDLC SERPL CALC-MCNC: 22 MG/DL (ref 5–40)

## 2018-08-28 NOTE — PROGRESS NOTES
Lipids-Your current lab results reveal a elevated ldl. Your total cholesterol should be under 200 and your ldl under 100. Work on following a low fat diet and exercise at least three times a week. CMP-Normal electrolyte levels except for an elevation in the glucose level, normal renal and liver function A1c-Your current hgbA1c is higher than your last level of 6.1. Work on following a diet low in simple carbohydrates and exercise. Recheck this test: hgbA1c  in  3 months. Continue with current  medications.

## 2018-12-27 RX ORDER — AMLODIPINE BESYLATE 10 MG/1
TABLET ORAL
Qty: 90 TAB | Refills: 1 | Status: SHIPPED | OUTPATIENT
Start: 2018-12-27 | End: 2019-03-13 | Stop reason: SDUPTHER

## 2019-03-13 ENCOUNTER — OFFICE VISIT (OUTPATIENT)
Dept: INTERNAL MEDICINE CLINIC | Age: 74
End: 2019-03-13

## 2019-03-13 VITALS
TEMPERATURE: 97.5 F | WEIGHT: 154.5 LBS | HEART RATE: 82 BPM | SYSTOLIC BLOOD PRESSURE: 122 MMHG | DIASTOLIC BLOOD PRESSURE: 77 MMHG | RESPIRATION RATE: 16 BRPM | HEIGHT: 71 IN | BODY MASS INDEX: 21.63 KG/M2 | OXYGEN SATURATION: 98 %

## 2019-03-13 DIAGNOSIS — R73.03 PREDIABETES: Primary | ICD-10-CM

## 2019-03-13 DIAGNOSIS — Z85.118 HISTORY OF LUNG CANCER: ICD-10-CM

## 2019-03-13 DIAGNOSIS — K21.9 GASTROESOPHAGEAL REFLUX DISEASE WITHOUT ESOPHAGITIS: ICD-10-CM

## 2019-03-13 DIAGNOSIS — R06.02 SOB (SHORTNESS OF BREATH): ICD-10-CM

## 2019-03-13 DIAGNOSIS — E78.00 HYPERCHOLESTEROLEMIA: ICD-10-CM

## 2019-03-13 DIAGNOSIS — I10 ESSENTIAL HYPERTENSION: ICD-10-CM

## 2019-03-13 RX ORDER — RANITIDINE 150 MG/1
150 TABLET, FILM COATED ORAL DAILY
Qty: 30 TAB | Refills: 3 | Status: SHIPPED | OUTPATIENT
Start: 2019-03-13 | End: 2020-04-07

## 2019-03-13 RX ORDER — ATORVASTATIN CALCIUM 10 MG/1
TABLET, FILM COATED ORAL
Qty: 90 TAB | Refills: 3 | Status: SHIPPED | OUTPATIENT
Start: 2019-03-13 | End: 2020-04-22

## 2019-03-13 RX ORDER — OMEPRAZOLE 40 MG/1
40 CAPSULE, DELAYED RELEASE ORAL DAILY
Qty: 90 CAP | Refills: 1 | Status: CANCELLED | OUTPATIENT
Start: 2019-03-13

## 2019-03-13 RX ORDER — AMLODIPINE BESYLATE 10 MG/1
TABLET ORAL
Qty: 90 TAB | Refills: 1 | Status: SHIPPED | OUTPATIENT
Start: 2019-03-13 | End: 2019-11-14 | Stop reason: SDUPTHER

## 2019-03-13 RX ORDER — RANITIDINE 150 MG/1
150 TABLET, FILM COATED ORAL 2 TIMES DAILY
Qty: 180 TAB | Refills: 1 | Status: SHIPPED | OUTPATIENT
Start: 2019-03-13 | End: 2020-01-06 | Stop reason: SDUPTHER

## 2019-03-13 NOTE — PROGRESS NOTES
SUBJECTIVE:   Mr. Tiffany Curry is a 75 y.o. male who is here for follow up of routine medical issues. Pt c/o numbness in his bilateral hands at night that is alleviated when he stretches his hands behind his head. Pt reports he sleeps on his side. Pt reports zanaflex did not help. Pt denies hand joint pain, elbow joint pain, neck pain. Pt c/o intermittent shortness of breath following activity. Pt reports it has been awhile since he had a PFT. He last saw pulmonology 3-4yrs ago. Pt's 2017 chest CT was reviewed. Pt denies wheezing, worsening SOB. Pt requested a prescription for zantac. He has been taking it OTC to alleviate GERD. Pt denies regular exercise. He plans to increase his activity once the weather warms. HTN: Pt's BP in the office today was 122/77. Pt is compliant in taking amlodipine. Patient denies chest pain, edema, headache, visual changes, dizziness, palpitations or syncope. HLD: Pt is compliant in taking atorvastatin. Patient denies abdominal pain, nausea, vomiting, diarrhea, arthralgias/myalgias due to the medication. Pt's 8/18 lipid panel revealed elevated LDL (133) but was otherwise normal.     Pre-DM: Pt's 8/18 A1c was 6.3%. PREVENTIVE:  Colonoscopy: 2/8/18, Dr. Iram Enrique, 5yr f/u  PSA: 2/18: 1.0  Pneumonia vaccine: PPSV-23: 9/23/14  Flu: declined  Eye Exam: due 2019    At this time, he is otherwise doing well and has brought no other complaints to my attention today. For a list of the medical issues addressed today, see the assessment and plan below.     PMH:   Past Medical History:   Diagnosis Date    Cancer [facility]) 2012    right lung SCC stage I    COPD     SIRS    Dyspepsia and other specified disorders of function of stomach     Essential hypertension     GERD (gastroesophageal reflux disease)     Hypercholesterolemia     Hyperlipidemia     Prediabetes 8/26/2015    Pseudogout 8/6/2012     PSH:  has a past surgical history that includes pr chest surgery procedure unlisted; hx other surgical (10 26 12); hx other surgical (11/13/12); hx hernia repair (2011); hx hernia repair (5/20/13); colonoscopy,nell galvez (2/8/2018); and colonoscopy (N/A, 2/8/2018). All: is allergic to lisinopril. MEDS:   Current Outpatient Medications   Medication Sig    raNITIdine (ZANTAC) 150 mg tablet Take 1 Tab by mouth two (2) times a day.  amLODIPine (NORVASC) 10 mg tablet TAKE 1 TABLET EVERY DAY    atorvastatin (LIPITOR) 10 mg tablet TAKE 1 TABLET  NIGHTLY    budesonide-formoterol (SYMBICORT) 160-4.5 mcg/actuation HFAA INHALE 1 PUFF TWICE DAILY    cyanocobalamin (VITAMIN B-12) 1,000 mcg tablet Take 2,500 mcg by mouth daily.  ranitidine (ZANTAC) 150 mg tablet Take 150 mg by mouth daily. Indications: GASTROESOPHAGEAL REFLUX    multivitamins-minerals-lutein (CENTRUM SILVER) Tab Take 1 Tab by mouth daily.  acetaminophen (TYLENOL EXTRA STRENGTH) 500 mg tablet Take 1,000 mg by mouth every six (6) hours as needed.  aspirin 81 mg chewable tablet Take 1 Tab by mouth daily.  tiZANidine (ZANAFLEX) 2 mg tablet Take 1 Tab by mouth daily. No current facility-administered medications for this visit. FH: family history includes Cancer in his brother and father; Dementia in his mother. SH:  reports that he quit smoking about 6 years ago. He has a 67.50 pack-year smoking history. he has never used smokeless tobacco. He reports that he does not drink alcohol or use drugs.      Review of Systems - History obtained from the patient  General ROS: no fever, chills, fatigue, body aches  Psychological ROS: no change in anxiety, depression, SI/HI  Ophthalmic ROS: no blurred vision, myopia, double vision  ENT ROS: no dysphagia, otalgia, otorrhea, rhinorrhea, post nasal drip  Respiratory ROS: shortness of breath, no cough or wheezing  Cardiovascular ROS: no chest pain or dyspnea on exertion  Gastrointestinal ROS: no abdominal pain, change in bowel habits, or black or bloody stools  Genito-Urinary ROS: no frequency, urgency, incontinence, dysuria, hematuria  Musculoskeletal ROS: numbness in bilateral hands at night no arthralgia, myalgia  Neurological ROS: no headaches, dizziness, lightheadedness, tremors, seizures  Dermatological ROS: no rash or lesions    OBJECTIVE:   Vitals:   Visit Vitals  /77 (BP 1 Location: Left arm, BP Patient Position: Sitting)   Pulse 82   Temp 97.5 °F (36.4 °C) (Oral)   Resp 16   Ht 5' 11\" (1.803 m)   Wt 154 lb 8 oz (70.1 kg)   SpO2 98%   BMI 21.55 kg/m²      Gen: Pleasant 75 y.o.  male in NAD. HEENT: PERRLA. EOMI. OP moist and pink. Neck: Supple. No LAD. HEART: RRR, No M/G/R.      LUNGS: CTAB No W/R. EXTREMITIES: Warm. No C/C/E.    MUSCULOSKELETAL: Normal ROM, muscle strength 5/5 all groups. NEURO: Alert and oriented x 3. Cranial nerves grossly intact. No focal sensory or motor deficits noted. SKIN: Warm. Dry. No rashes or other lesions noted. ASSESSMENT/ PLAN: Diagnoses and all orders for this visit:    1. Prediabetes  -     HEMOGLOBIN A1C WITH EAG  -     METABOLIC PANEL, COMPREHENSIVE    2. Essential hypertension  -     METABOLIC PANEL, COMPREHENSIVE    3. Gastroesophageal reflux disease without esophagitis  -     raNITIdine (ZANTAC) 150 mg tablet; Take 1 Tab by mouth two (2) times a day. 4. Hypercholesterolemia  -     atorvastatin (LIPITOR) 10 mg tablet; TAKE 1 TABLET  NIGHTLY    5. SOB (shortness of breath)    6. History of lung cancer  -     REFERRAL TO PULMONARY DISEASE    Other orders  -     amLODIPine (NORVASC) 10 mg tablet; TAKE 1 TABLET EVERY DAY        ICD-10-CM ICD-9-CM    1. Prediabetes R73.03 790.29 HEMOGLOBIN A1C WITH EAG      METABOLIC PANEL, COMPREHENSIVE   2. Essential hypertension I23 491.5 METABOLIC PANEL, COMPREHENSIVE   3. Gastroesophageal reflux disease without esophagitis K21.9 530.81 raNITIdine (ZANTAC) 150 mg tablet   4. Hypercholesterolemia E78.00 272.0 atorvastatin (LIPITOR) 10 mg tablet   5.  SOB (shortness of breath) R06.02 786.05    6. History of lung cancer Z85. 118 V10.11 REFERRAL TO PULMONARY DISEASE      1. Prediabetes  I advised pt to avoid sugars and starches and increase exercise when possible. Recheck A1c, CMP.     2. Hypertension  BP seems to be well controlled. I recommended continuing current dose of amlodipine, eating a low sodium diet, and increasing exercise. Recheck CMP. 3. GERD  I prescribed zantac and advised pt to avoid GERD-trigger foods. 4. Hypercholesterolemia  Lipid panel shows elevated LDL. I recommended continuing current dose of atorvastatin, eating a low fat diet, and increasing exercise. 5. SOB  I advised pt to follow up with Dr. Rito Batista (pulmonology) for further evaluation if the SOB worsens. 6. Hx of lung cancer  I advised pt to follow up with Dr. Rito Batista (pulmonology) for further evaluation if the SOB worsens. Follow-up Disposition:  Return in about 6 months (around 9/13/2019) for follow up. I have reviewed the patient's medications and risks/side effects/benefits were discussed. Diagnosis(-es) explained to patient and questions answered. Literature provided where appropriate.      Written by Karl Canchola, as dictated by Cleopatra Kim MD.

## 2019-03-13 NOTE — TELEPHONE ENCOUNTER
#[phone] wife states the ranitidine needs to go to [facility] mail order for 90 day script with refills.

## 2019-03-14 LAB
ALBUMIN SERPL-MCNC: 4.8 G/DL (ref 3.5–4.8)
ALBUMIN/GLOB SERPL: 1.8 {RATIO} (ref 1.2–2.2)
ALP SERPL-CCNC: 11 IU/L (ref 39–117)
ALT SERPL-CCNC: 19 IU/L (ref 0–44)
AST SERPL-CCNC: 24 IU/L (ref 0–40)
BILIRUB SERPL-MCNC: 0.8 MG/DL (ref 0–1.2)
BUN SERPL-MCNC: 12 MG/DL (ref 8–27)
BUN/CREAT SERPL: 13 (ref 10–24)
CALCIUM SERPL-MCNC: 9.8 MG/DL (ref 8.6–10.2)
CHLORIDE SERPL-SCNC: 102 MMOL/L (ref 96–106)
CO2 SERPL-SCNC: 23 MMOL/L (ref 20–29)
CREAT SERPL-MCNC: 0.92 MG/DL (ref 0.76–1.27)
EST. AVERAGE GLUCOSE BLD GHB EST-MCNC: 131 MG/DL
GLOBULIN SER CALC-MCNC: 2.7 G/DL (ref 1.5–4.5)
GLUCOSE SERPL-MCNC: 95 MG/DL (ref 65–99)
HBA1C MFR BLD: 6.2 % (ref 4.8–5.6)
POTASSIUM SERPL-SCNC: 4.3 MMOL/L (ref 3.5–5.2)
PROT SERPL-MCNC: 7.5 G/DL (ref 6–8.5)
SODIUM SERPL-SCNC: 143 MMOL/L (ref 134–144)

## 2019-08-06 DIAGNOSIS — J44.9 CHRONIC OBSTRUCTIVE PULMONARY DISEASE, UNSPECIFIED COPD TYPE (HCC): ICD-10-CM

## 2019-08-07 RX ORDER — BUDESONIDE AND FORMOTEROL FUMARATE DIHYDRATE 160; 4.5 UG/1; UG/1
AEROSOL RESPIRATORY (INHALATION)
Qty: 1 INHALER | Refills: 5 | Status: SHIPPED | OUTPATIENT
Start: 2019-08-07 | End: 2020-01-29 | Stop reason: SDUPTHER

## 2020-01-06 ENCOUNTER — OFFICE VISIT (OUTPATIENT)
Dept: INTERNAL MEDICINE CLINIC | Age: 75
End: 2020-01-06

## 2020-01-06 VITALS
WEIGHT: 154 LBS | HEIGHT: 71 IN | DIASTOLIC BLOOD PRESSURE: 61 MMHG | SYSTOLIC BLOOD PRESSURE: 121 MMHG | HEART RATE: 82 BPM | BODY MASS INDEX: 21.56 KG/M2 | RESPIRATION RATE: 18 BRPM | OXYGEN SATURATION: 95 % | TEMPERATURE: 97.4 F

## 2020-01-06 DIAGNOSIS — Z12.5 SPECIAL SCREENING FOR MALIGNANT NEOPLASM OF PROSTATE: ICD-10-CM

## 2020-01-06 DIAGNOSIS — M62.838 MUSCLE SPASM: ICD-10-CM

## 2020-01-06 DIAGNOSIS — Z13.31 SCREENING FOR DEPRESSION: ICD-10-CM

## 2020-01-06 DIAGNOSIS — I10 ESSENTIAL HYPERTENSION: ICD-10-CM

## 2020-01-06 DIAGNOSIS — Z13.39 SCREENING FOR ALCOHOLISM: ICD-10-CM

## 2020-01-06 DIAGNOSIS — Z00.00 MEDICARE ANNUAL WELLNESS VISIT, SUBSEQUENT: Primary | ICD-10-CM

## 2020-01-06 DIAGNOSIS — R73.09 ELEVATED HEMOGLOBIN A1C: ICD-10-CM

## 2020-01-06 DIAGNOSIS — Z13.6 SCREENING FOR ISCHEMIC HEART DISEASE: ICD-10-CM

## 2020-01-06 NOTE — PROGRESS NOTES
This is the Subsequent Medicare Annual Wellness Exam, performed 12 months or more after the Initial AWV or the last Subsequent AWV    I have reviewed the patient's medical history in detail and updated the computerized patient record. 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    Chronic obstructive pulmonary disease ([facility] [address] J44.9     Past Medical History:   Diagnosis Date    Cancer ([facility] [address] 2012    right lung SCC stage I    COPD     SIRS    Dyspepsia and other specified disorders of function of stomach     Essential hypertension     GERD (gastroesophageal reflux disease)     Hypercholesterolemia     Hyperlipidemia     Prediabetes 8/26/2015    Pseudogout 8/6/2012      Past Surgical History:   Procedure Laterality Date    CHEST SURGERY PROCEDURE UNLISTED      see history under other below    COLONOSCOPY N/A 2/8/2018    COLONOSCOPY performed by Vincenzo Atkinson MD at hospitals ENDOSCOPY    COLONOSCOPY,RE[facility] ALEXANDER BAZAN  2/8/2018         HX HERNIA REPAIR  2011    gene.     HX HERNIA REPAIR  5/20/13     BILATERAL OPEN INGUINAL HERNIA REPAIR WITH MESH    HX OTHER SURGICAL  10 26 12    EBUS    HX OTHER SURGICAL  11/13/12    BRONCHSCOPY,RIGHT THORACOSCOPY,THORACOSCOPIC RIGHT UPPER LUNG  LOBE RESECTION, MEDIASTINAL LYMPH NODE DISSECTION; INTERCOSTAL NERVE BLOCK     Current Outpatient Medications   Medication Sig Dispense Refill    amLODIPine (NORVASC) 10 mg tablet TAKE 1 TABLET EVERY DAY 90 Tab 1    budesonide-formoterol (SYMBICORT) 160-4.5 mcg/actuation HFAA INHALE 1 PUFF TWICE DAILY 1 Inhaler 5    atorvastatin (LIPITOR) 10 mg tablet TAKE 1 TABLET  NIGHTLY 90 Tab 3    raNITIdine (ZANTAC) 150 mg tablet Take 1 Tab by mouth daily. 30 Tab 3    cyanocobalamin (VITAMIN B-12) 1,000 mcg tablet Take 2,500 mcg by mouth daily.  multivitamins-minerals-lutein (CENTRUM SILVER) Tab Take 1 Tab by mouth daily.  acetaminophen (TYLENOL EXTRA STRENGTH) 500 mg tablet Take 1,000 mg by mouth every six (6) hours as needed.  aspirin 81 mg chewable tablet Take 1 Tab by mouth daily. 30 Tab 1    tiZANidine (ZANAFLEX) 2 mg tablet Take 1 Tab by mouth daily. (Patient not taking: Reported on 2020) 30 Tab 1     Allergies   Allergen Reactions    Lisinopril Angioedema     Entire face swells       Family History   Problem Relation Age of Onset    Dementia Mother     Cancer Father         lung    Cancer Brother         throat     Social History     Tobacco Use    Smoking status: Former Smoker     Packs/day: 1.50     Years: 45.00     Pack years: 67.50     Last attempt to quit: 2012     Years since quittin.4    Smokeless tobacco: Never Used   Substance Use Topics    Alcohol use: No       Depression Risk Factor Screening:     3 most recent PHQ Screens 2020   Little interest or pleasure in doing things Not at all   Feeling down, depressed, irritable, or hopeless Not at all   Total Score PHQ 2 0       Alcohol Risk Factor Screening (MALE > 65): Do you average more 1 drink per night or more than 7 drinks a week: No    In the past three months have you have had more than 4 drinks containing alcohol on one occasion: No      Functional Ability and Level of Safety:   Hearing: The patient needs further evaluation. Activities of Daily Living: The home contains: no safety equipment. Patient does total self care    Ambulation: with no difficulty    Fall Risk:  Fall Risk Assessment, last 12 mths 2020   Able to walk?  Yes   Fall in past 15 months? No       Abuse Screen:  Patient is not abused    Cognitive Screening   Has your family/caregiver stated any concerns about your memory: no      Patient Care Team   Patient Care Team:  Brandon Dempsey MD as PCP - General (Family Practice)  Brandon Dempsey MD as PCP - [facility] Provider  Umesh Connolly MD (General Surgery)  Tracey Aaron MD (Gastroenterology)  Sihva Abebe MD (Thoracic (non-cardiac) Surgery)  Crystal Wu MD (Hematology and Oncology)  Reese Downs RN as Nurse Navigator  Claribel Xie DO (Ophthalmology)    Assessment/Plan   Education and counseling provided:  Are appropriate based on today's review and evaluation  End-of-Life planning (with patient's consent)  Prostate cancer screening tests (PSA, covered annually)  Cardiovascular screening blood test    Diagnoses and all orders for this visit:    1. Medicare annual wellness visit, subsequent    2. Muscle spasm    3. Essential hypertension    4. Elevated hemoglobin A1c    5. Screening for alcoholism  -     [place] ANNUAL ALCOHOL SCREEN 15 MIN    6. Screening for depression  -     DEPRESSION SCREEN ANNUAL    7. Screening for ischemic heart disease  -     LIPID PANEL    8. Special screening for malignant neoplasm of prostate  -     PSA SCREENING (SCREENING)        Health Maintenance Due   Topic Date Due    Shingrix Vaccine Age 49> (1 of 2) 03/25/1994    GLAUCOMA SCREENING Q2Y  03/25/2009    AAA Screening 73-69 YO Male Smoking Patients  03/25/2009    MEDICARE YEARLY EXAM  02/27/2019    Influenza Age 9 to Adult  08/01/2019     PREVENTIVE:  Colonoscopy: UTD (2/8/18: Dr. Marquita Gill, 5yr f/u)    PSA: Ordered. (2/18: 1.0)    PPSV-23: 9/23/14    Flu: declined    Eye Exam: UTD         ACP-ACP planning continued today. I explained the purpose of the advance medical directive.   Patient expressed interest in reviewing material.  I provided the patient with a \"Your Right to Decide\" booklet, [employer] Kit, and a copy of an Advance Directive.   Patient agrees to providing a copy to the office when available.

## 2020-01-06 NOTE — PROGRESS NOTES
Identified pt with two pt identifiers(name and ). Reviewed record in preparation for visit and have obtained necessary documentation. Chief Complaint   Patient presents with    Hypertension     overdue f/u         Visit Vitals  /61 (BP 1 Location: Left arm, BP Patient Position: Sitting)   Pulse 82   Temp 97.4 °F (36.3 °C) (Oral)   Resp 18   Ht 5' 11\" (1.803 m)   Wt 154 lb (69.9 kg)   SpO2 95%   BMI 21.48 kg/m²       Health Maintenance Due   Topic    Shingrix Vaccine Age 50> (1 of 2)    GLAUCOMA SCREENING Q2Y     AAA Screening 73-69 YO Male Smoking Patients     MEDICARE YEARLY EXAM     Influenza Age 5 to Adult        Med Reconciliation: Completed    Coordination of Care Questionnaire:  :   1) Have you been to an emergency room, urgent care, or hospitalized since your last visit? If yes, where when, and reason for visit? No       2. Have seen or consulted any other health care provider since your last visit? If yes, where when, and reason for visit? No       3) Do you have an Advanced Directive/ Living Will in place? No  If yes, do we have a copy on file   If no, would you like information     Patient is accompanied by self I have received verbal consent from Juana Perkins to discuss any/all medical information while they are present in the room.

## 2020-01-06 NOTE — PROGRESS NOTES
SUBJECTIVE:   Mr. Jesi Brito is a 75 y.o. male who is here for follow up of routine medical issues. HTN: Pt's BP in the office today was 121/61. Pt is compliant in taking amlodipine 10 mg tablet every day. Patient denies chest pain, edema, headache, visual changes, dizziness, palpitations or syncope.      HLD: Pt is compliant in taking atorvastatin 10 mg tablet every day. Patient denies abdominal pain, nausea, vomiting, diarrhea, arthralgias/myalgias due to the medication. Pt's last lipid panel on 8/27/18 revealed nl total cholesterol (197), nl triglycerides (109), and elevated LDL (133). Pre-DM: Pt's 3/13/19 A1c was 6.2%. Pt reports that he has been experiencing abdominal cramps at night. He has not sure what is causing them. He notes that when he drinks cold water the sx dissipate. Pt has not smoked for the past 10 years after smoking for 40 years. He was not interested in a AAA screening. Pt has had a normal low-dose CT with pulmonology. He maintains regular exercise.      PREVENTIVE:  Colonoscopy: UTD (2/8/18: Dr. Amelia Rojas, 5yr f/u)  PSA: Ordered. (2/18: 1.0)  PPSV-23: 9/23/14  Flu: declined  Eye Exam: UTD    At this time, he is otherwise doing well and has brought no other complaints to my attention today. For a list of the medical issues addressed today, see the assessment and plan below.     PMH:   Past Medical History:   Diagnosis Date    Cancer [facility]) 2012    right lung SCC stage I    COPD     SIRS    Dyspepsia and other specified disorders of function of stomach     Essential hypertension     GERD (gastroesophageal reflux disease)     Hypercholesterolemia     Hyperlipidemia     Prediabetes 8/26/2015    Pseudogout 8/6/2012     PSH:  has a past surgical history that includes pr chest surgery procedure unlisted; hx other surgical (10 26 12); hx other surgical (11/13/12); hx hernia repair (2011); hx hernia repair (5/20/13); colonoscopy,remv lesn,snare (2/8/2018); and colonoscopy (N/A, 2/8/2018). All: is allergic to lisinopril. MEDS:   Current Outpatient Medications   Medication Sig    amLODIPine (NORVASC) 10 mg tablet TAKE 1 TABLET EVERY DAY    budesonide-formoterol (SYMBICORT) 160-4.5 mcg/actuation HFAA INHALE 1 PUFF TWICE DAILY    atorvastatin (LIPITOR) 10 mg tablet TAKE 1 TABLET  NIGHTLY    raNITIdine (ZANTAC) 150 mg tablet Take 1 Tab by mouth daily.  cyanocobalamin (VITAMIN B-12) 1,000 mcg tablet Take 2,500 mcg by mouth daily.  multivitamins-minerals-lutein (CENTRUM SILVER) Tab Take 1 Tab by mouth daily.  acetaminophen (TYLENOL EXTRA STRENGTH) 500 mg tablet Take 1,000 mg by mouth every six (6) hours as needed.  aspirin 81 mg chewable tablet Take 1 Tab by mouth daily.  tiZANidine (ZANAFLEX) 2 mg tablet Take 1 Tab by mouth daily. (Patient not taking: Reported on 1/6/2020)     No current facility-administered medications for this visit. FH: family history includes Cancer in his brother and father; Dementia in his mother. SH:  reports that he quit smoking about 7 years ago. He has a 67.50 pack-year smoking history. He has never used smokeless tobacco. He reports that he does not drink alcohol or use drugs. Review of Systems - History obtained from the patient  General ROS: no fever, chills, fatigue, body aches  Psychological ROS: no change in anxiety, depression, SI/HI  Ophthalmic ROS: no blurred vision, myopia, double vision  ENT ROS: no dysphagia, otalgia, otorrhea, rhinorrhea, post nasal drip  Respiratory ROS: no cough, shortness of breath, or wheezing  Cardiovascular ROS: no chest pain or dyspnea on exertion  Gastrointestinal ROS: +abdominal cramps.  no abdominal pain, change in bowel habits, or black or bloody stools  Genito-Urinary ROS: no frequency, urgency, incontinence, dysuria, hematuria  Musculoskeletal ROS: no arthralgia, myalgia  Neurological ROS: no headaches, dizziness, lightheadedness, tremors, seizures  Dermatological ROS: no rash or lesions    OBJECTIVE:   Vitals:   Visit Vitals  /61 (BP 1 Location: Left arm, BP Patient Position: Sitting)   Pulse 82   Temp 97.4 °F (36.3 °C) (Oral)   Resp 18   Ht 5' 11\" (1.803 m)   Wt 154 lb (69.9 kg)   SpO2 95%   BMI 21.48 kg/m²      Gen: Pleasant 75 y.o.  male in NAD. HEENT: PERRLA. EOMI. OP moist and pink. Neck: Supple. No LAD. HEART: RRR, No M/G/R.      LUNGS: CTAB No W/R. ABDOMEN: S, NT, ND, BS+. EXTREMITIES: Warm. No C/C/E.    MUSCULOSKELETAL: Normal ROM, muscle strength 5/5 all groups. NEURO: Alert and oriented x 3. Cranial nerves grossly intact. No focal sensory or motor deficits noted. SKIN: Warm. Dry. No rashes or other lesions noted. ASSESSMENT/ PLAN: Diagnoses and all orders for this visit:    1. Medicare annual wellness visit, subsequent    2. Muscle spasm  -     METABOLIC PANEL, COMPREHENSIVE  -     MAGNESIUM    3. Essential hypertension  -     METABOLIC PANEL, COMPREHENSIVE    4. Elevated hemoglobin D0T  -     METABOLIC PANEL, COMPREHENSIVE  -     HEMOGLOBIN A1C WITH EAG    5. Screening for alcoholism  -     [place] ANNUAL ALCOHOL SCREEN 15 MIN    6. Screening for depression  -     DEPRESSION SCREEN ANNUAL    7. Screening for ischemic heart disease  -     LIPID PANEL    8. Special screening for malignant neoplasm of prostate  -     PSA SCREENING (SCREENING)        ICD-10-CM ICD-9-CM    1. Medicare annual wellness visit, subsequent Z00.00 V70.0    2. Muscle spasm C78.377 007.22 METABOLIC PANEL, COMPREHENSIVE      MAGNESIUM   3. Essential hypertension V49 744.7 METABOLIC PANEL, COMPREHENSIVE   4. Elevated hemoglobin A1c E89.80 879.10 METABOLIC PANEL, COMPREHENSIVE      HEMOGLOBIN A1C WITH EAG   5. Screening for alcoholism Z13.39 V79.1 [place] ANNUAL ALCOHOL SCREEN 15 MIN   6. Screening for depression Z13.31 V79.0 DEPRESSION SCREEN ANNUAL   7. Screening for ischemic heart disease Z13.6 V81.0 LIPID PANEL   8.  Special screening for malignant neoplasm of prostate Z12.5 V76.44 PSA SCREENING (SCREENING)        1. Medicare Annual Wellness Visit  See attached note. 2. Muscle Spasm  Check CMP and Magnesium levels. 3. Hypertension  BP seems to be well controlled. I recommended continuing current dose of amlodipine 10 mg tablet every day, eating a low sodium diet, and increasing exercise. Recheck CMP. 4. Elevated Hemoglobin A1c  Check HbA1c and CMP. 5. Screening for Alcoholism  See attached note. 6. Screening for Depression  See attached note. 7. Screening for Ischemic Heart Disease  Recheck lipid panel. 8. Special Screening of Malignant Neoplasm of Prostate  Check PSA. Follow-up and Dispositions    · Return in about 6 months (around 7/6/2020) for follow up htn and one year for annual wellness. I have reviewed the patient's medications and risks/side effects/benefits were discussed. Diagnosis(-es) explained to patient and questions answered. Literature provided where appropriate.      Written by Arin Renee, as dictated by Eugene Padilla MD.

## 2020-01-06 NOTE — PATIENT INSTRUCTIONS
Medicare Wellness Visit, Male    The best way to live healthy is to have a lifestyle where you eat a well-balanced diet, exercise regularly, limit alcohol use, and quit all forms of tobacco/nicotine, if applicable. Regular preventive services are another way to keep healthy. Preventive services (vaccines, screening tests, monitoring & exams) can help personalize your care plan, which helps you manage your own care. Screening tests can find health problems at the earliest stages, when they are easiest to treat. [facility] follows the current, evidence-based guidelines published by the [facility] ([facility]) when recommending preventive services for our patients. Because we follow these guidelines, sometimes recommendations change over time as research supports it. (For example, a prostate screening blood test is no longer routinely recommended for men with no symptoms). Of course, you and your doctor may decide to screen more often for some diseases, based on your risk and co-morbidities (chronic disease you are already diagnosed with). Preventive services for you include:  - Medicare offers their members a free annual wellness visit, which is time for you and your primary care provider to discuss and plan for your preventive service needs. Take advantage of this benefit every year!  -All adults over age 72 should receive the recommended pneumonia vaccines. Current USPSTF guidelines recommend a series of two vaccines for the best pneumonia protection.   -All adults should have a flu vaccine yearly and tetanus vaccine every 10 years.  -All adults age 48 and older should receive the shingles vaccines (series of two vaccines).        -All adults age 38-68 who are overweight should have a diabetes screening test once every three years.   -Other screening tests & preventive services for persons with diabetes include: an eye exam to screen for diabetic retinopathy, a kidney function test, a foot exam, and stricter control over your cholesterol.   -Cardiovascular screening for adults with routine risk involves an electrocardiogram (ECG) at intervals determined by the provider.   -Colorectal cancer screening should be done for adults age 54-65 with no increased risk factors for colorectal cancer. There are a number of acceptable methods of screening for this type of cancer. Each test has its own benefits and drawbacks. Discuss with your provider what is most appropriate for you during your annual wellness visit. The different tests include: colonoscopy (considered the best screening method), a fecal occult blood test, a fecal DNA test, and sigmoidoscopy.  -All adults born between [facility] should be screened once for Hepatitis C.  -An Abdominal Aortic Aneurysm (AAA) Screening is recommended for men age 73-68 who has ever smoked in their lifetime.      Here is a list of your current Health Maintenance items (your personalized list of preventive services) with a due date:  Health Maintenance Due   Topic Date Due    Shingles Vaccine (1 of 2) 03/25/1994    Glaucoma Screening   03/25/2009    AAA Screening  03/25/2009    Annual Well Visit  02/27/2019    Flu Vaccine  08/01/2019

## 2020-01-07 LAB
ALBUMIN SERPL-MCNC: 4.7 G/DL (ref 3.5–4.8)
ALBUMIN/GLOB SERPL: 2.1 {RATIO} (ref 1.2–2.2)
ALP SERPL-CCNC: 9 IU/L (ref 39–117)
ALT SERPL-CCNC: 18 IU/L (ref 0–44)
AST SERPL-CCNC: 23 IU/L (ref 0–40)
BILIRUB SERPL-MCNC: 0.6 MG/DL (ref 0–1.2)
BUN SERPL-MCNC: 12 MG/DL (ref 8–27)
BUN/CREAT SERPL: 13 (ref 10–24)
CALCIUM SERPL-MCNC: 9.9 MG/DL (ref 8.6–10.2)
CHLORIDE SERPL-SCNC: 98 MMOL/L (ref 96–106)
CHOLEST SERPL-MCNC: 200 MG/DL (ref 100–199)
CO2 SERPL-SCNC: 26 MMOL/L (ref 20–29)
CREAT SERPL-MCNC: 0.94 MG/DL (ref 0.76–1.27)
EST. AVERAGE GLUCOSE BLD GHB EST-MCNC: 134 MG/DL
GLOBULIN SER CALC-MCNC: 2.2 G/DL (ref 1.5–4.5)
GLUCOSE SERPL-MCNC: 121 MG/DL (ref 65–99)
HBA1C MFR BLD: 6.3 % (ref 4.8–5.6)
HDLC SERPL-MCNC: 42 MG/DL
LDLC SERPL CALC-MCNC: 139 MG/DL (ref 0–99)
MAGNESIUM SERPL-MCNC: 1.9 MG/DL (ref 1.6–2.3)
POTASSIUM SERPL-SCNC: 4.6 MMOL/L (ref 3.5–5.2)
PROT SERPL-MCNC: 6.9 G/DL (ref 6–8.5)
PSA SERPL-MCNC: 1.1 NG/ML (ref 0–4)
SODIUM SERPL-SCNC: 138 MMOL/L (ref 134–144)
TRIGL SERPL-MCNC: 94 MG/DL (ref 0–149)
VLDLC SERPL CALC-MCNC: 19 MG/DL (ref 5–40)

## 2020-01-23 NOTE — PROGRESS NOTES
CMP-Normal electrolyte levels except for an elevation in the glucose level, normal renal and liver function  Lipids-Your current lab results reveal a elevated total cholesterol and ldl. Your total cholesterol should be under 200 and your ldl under 100. Work on following a low fat diet and exercise at least three times a week. A1c-Your current hgbA1c is slightly higher than your last level of 6.2. Work on following a diabetic diet and exercise. Recheck this test: hgbA1c  in  3 months. Continue with current  medications.     PSA-normal  Magnesium-normal

## 2020-01-29 ENCOUNTER — TELEPHONE (OUTPATIENT)
Dept: INTERNAL MEDICINE CLINIC | Age: 75
End: 2020-01-29

## 2020-01-29 DIAGNOSIS — J44.9 CHRONIC OBSTRUCTIVE PULMONARY DISEASE, UNSPECIFIED COPD TYPE (HCC): ICD-10-CM

## 2020-01-29 RX ORDER — BUDESONIDE AND FORMOTEROL FUMARATE DIHYDRATE 160; 4.5 UG/1; UG/1
AEROSOL RESPIRATORY (INHALATION)
Qty: 1 INHALER | Refills: 5 | Status: SHIPPED | OUTPATIENT
Start: 2020-01-29 | End: 2021-04-08 | Stop reason: SDUPTHER

## 2020-01-29 NOTE — TELEPHONE ENCOUNTER
Pt wife states they need symbicort sent over to the pharmacy  Insurance will not pay for generic [address] [facility]

## 2020-03-31 ENCOUNTER — TELEPHONE (OUTPATIENT)
Dept: INTERNAL MEDICINE CLINIC | Age: 75
End: 2020-03-31

## 2020-03-31 DIAGNOSIS — K21.9 GASTROESOPHAGEAL REFLUX DISEASE WITHOUT ESOPHAGITIS: Primary | ICD-10-CM

## 2020-03-31 NOTE — TELEPHONE ENCOUNTER
----- Message from [address] sent at 3/31/2020  4:01 PM EDT -----  Regarding: Dr. Zuniga [place]: [phone]  Caller's first and last name and relationship (if not the patient): Mrs. Hollis/Wife  Best contact number(s): [phone]  Whose call is being returned: [facility] office  Details to clarify the request: Concerning pt medication

## 2020-04-07 RX ORDER — OMEPRAZOLE 20 MG/1
20 CAPSULE, DELAYED RELEASE ORAL DAILY
Qty: 30 CAP | Refills: 1 | Status: SHIPPED | OUTPATIENT
Start: 2020-04-07 | End: 2021-06-14

## 2020-04-07 NOTE — TELEPHONE ENCOUNTER
Is he having reflux? If not he does not need the medication. If he wants something he can take Prilosec. If needed please pend a prescription for the prilosec 20 mg 1 po daily #30.

## 2020-04-07 NOTE — TELEPHONE ENCOUNTER
Identified patient 2 identifiers verified. Spoke with patient he would like medication be sent to [facility].

## 2020-04-21 DIAGNOSIS — E78.00 HYPERCHOLESTEROLEMIA: ICD-10-CM

## 2020-04-22 RX ORDER — ATORVASTATIN CALCIUM 10 MG/1
TABLET, FILM COATED ORAL
Qty: 90 TAB | Refills: 3 | Status: SHIPPED | OUTPATIENT
Start: 2020-04-22 | End: 2021-06-14 | Stop reason: SDUPTHER

## 2020-10-14 RX ORDER — AMLODIPINE BESYLATE 10 MG/1
TABLET ORAL
Qty: 90 TAB | Refills: 1 | Status: SHIPPED | OUTPATIENT
Start: 2020-10-14 | End: 2021-06-14 | Stop reason: SDUPTHER

## 2020-11-13 PROBLEM — Z85.89 HISTORY OF SQUAMOUS CELL CARCINOMA: Status: ACTIVE | Noted: 2020-11-13

## 2021-04-08 DIAGNOSIS — J44.9 CHRONIC OBSTRUCTIVE PULMONARY DISEASE, UNSPECIFIED COPD TYPE (HCC): ICD-10-CM

## 2021-04-08 NOTE — TELEPHONE ENCOUNTER
Patient needs refill done thru [facility] indicated. Please call if any questions.  Thank you stated

## 2021-04-11 RX ORDER — BUDESONIDE AND FORMOTEROL FUMARATE DIHYDRATE 160; 4.5 UG/1; UG/1
AEROSOL RESPIRATORY (INHALATION)
Qty: 1 INHALER | Refills: 5 | Status: SHIPPED | OUTPATIENT
Start: 2021-04-11 | End: 2021-04-23 | Stop reason: SDUPTHER

## 2021-04-23 DIAGNOSIS — J44.9 CHRONIC OBSTRUCTIVE PULMONARY DISEASE, UNSPECIFIED COPD TYPE (HCC): ICD-10-CM

## 2021-04-23 RX ORDER — BUDESONIDE AND FORMOTEROL FUMARATE DIHYDRATE 160; 4.5 UG/1; UG/1
AEROSOL RESPIRATORY (INHALATION)
Qty: 1 INHALER | Refills: 5 | Status: SHIPPED | OUTPATIENT
Start: 2021-04-23

## 2021-04-23 NOTE — TELEPHONE ENCOUNTER
Future Appointments:  No future appointments.      Last Appointment With Me:  Visit date not found     Requested Prescriptions     Pending Prescriptions Disp Refills    budesonide-formoteroL (Symbicort) 160-4.5 mcg/actuation HFAA 1 Inhaler 5     Sig: INHALE 1 PUFF TWICE DAILY

## 2021-05-28 ENCOUNTER — TELEPHONE (OUTPATIENT)
Dept: INTERNAL MEDICINE CLINIC | Age: 76
End: 2021-05-28

## 2021-05-28 DIAGNOSIS — E78.00 HYPERCHOLESTEROLEMIA: Primary | ICD-10-CM

## 2021-05-28 DIAGNOSIS — Z00.00 MEDICARE ANNUAL WELLNESS VISIT, SUBSEQUENT: ICD-10-CM

## 2021-05-28 DIAGNOSIS — Z12.5 SCREENING PSA (PROSTATE SPECIFIC ANTIGEN): ICD-10-CM

## 2021-05-28 DIAGNOSIS — R73.03 PREDIABETES: ICD-10-CM

## 2021-05-28 DIAGNOSIS — R73.09 ELEVATED HEMOGLOBIN A1C: ICD-10-CM

## 2021-05-28 NOTE — TELEPHONE ENCOUNTER
Called pt wife   2 pt identifier  Spoke to pt wife   Pt wife states that  needs an sooner visit   Pt wife advised that he can come in June 17 at 1:30pm   Pt wife accepts the offer   Pt wife states that she has not received his labs like she has received others   Pt asked which lab would she want   Pt advised that labs will be faxed over and a call will be placed   Pt shown understanding and had no further questions   Labs signed with permission of pcp

## 2021-06-01 DIAGNOSIS — R73.09 ELEVATED HEMOGLOBIN A1C: Primary | ICD-10-CM

## 2021-06-02 LAB
ALBUMIN SERPL-MCNC: 4.8 G/DL (ref 3.7–4.7)
ALBUMIN/GLOB SERPL: 2.1 {RATIO} (ref 1.2–2.2)
ALP SERPL-CCNC: 11 IU/L (ref 48–121)
ALT SERPL-CCNC: 16 IU/L (ref 0–44)
AST SERPL-CCNC: 21 IU/L (ref 0–40)
BASOPHILS # BLD AUTO: 0 X10E3/UL (ref 0–0.2)
BASOPHILS NFR BLD AUTO: 1 %
BILIRUB SERPL-MCNC: 0.7 MG/DL (ref 0–1.2)
BUN SERPL-MCNC: 14 MG/DL (ref 8–27)
BUN/CREAT SERPL: 19 (ref 10–24)
CALCIUM SERPL-MCNC: 10 MG/DL (ref 8.6–10.2)
CHLORIDE SERPL-SCNC: 101 MMOL/L (ref 96–106)
CHOLEST SERPL-MCNC: 176 MG/DL (ref 100–199)
CO2 SERPL-SCNC: 25 MMOL/L (ref 20–29)
CREAT SERPL-MCNC: 0.75 MG/DL (ref 0.76–1.27)
EOSINOPHIL # BLD AUTO: 0 X10E3/UL (ref 0–0.4)
EOSINOPHIL NFR BLD AUTO: 1 %
ERYTHROCYTE [DISTWIDTH] IN BLOOD BY AUTOMATED COUNT: 13.3 % (ref 11.6–15.4)
EST. AVERAGE GLUCOSE BLD GHB EST-MCNC: 128 MG/DL
GLOBULIN SER CALC-MCNC: 2.3 G/DL (ref 1.5–4.5)
GLUCOSE SERPL-MCNC: 115 MG/DL (ref 65–99)
HBA1C MFR BLD: 6.1 % (ref 4.8–5.6)
HCT VFR BLD AUTO: 44.4 % (ref 37.5–51)
HDLC SERPL-MCNC: 41 MG/DL
HGB BLD-MCNC: 15 G/DL (ref 13–17.7)
IMM GRANULOCYTES # BLD AUTO: 0 X10E3/UL (ref 0–0.1)
IMM GRANULOCYTES NFR BLD AUTO: 0 %
LDLC SERPL CALC-MCNC: 117 MG/DL (ref 0–99)
LYMPHOCYTES # BLD AUTO: 0.8 X10E3/UL (ref 0.7–3.1)
LYMPHOCYTES NFR BLD AUTO: 23 %
MCH RBC QN AUTO: 32.4 PG (ref 26.6–33)
MCHC RBC AUTO-ENTMCNC: 33.8 G/DL (ref 31.5–35.7)
MCV RBC AUTO: 96 FL (ref 79–97)
MONOCYTES # BLD AUTO: 0.5 X10E3/UL (ref 0.1–0.9)
MONOCYTES NFR BLD AUTO: 14 %
NEUTROPHILS # BLD AUTO: 2.1 X10E3/UL (ref 1.4–7)
NEUTROPHILS NFR BLD AUTO: 61 %
PLATELET # BLD AUTO: 296 X10E3/UL (ref 150–450)
POTASSIUM SERPL-SCNC: 4.2 MMOL/L (ref 3.5–5.2)
PROT SERPL-MCNC: 7.1 G/DL (ref 6–8.5)
PSA SERPL-MCNC: 1.2 NG/ML (ref 0–4)
RBC # BLD AUTO: 4.63 X10E6/UL (ref 4.14–5.8)
SODIUM SERPL-SCNC: 140 MMOL/L (ref 134–144)
T4 FREE SERPL-MCNC: 1.25 NG/DL (ref 0.82–1.77)
TRIGL SERPL-MCNC: 99 MG/DL (ref 0–149)
TSH SERPL DL<=0.005 MIU/L-ACNC: 0.98 UIU/ML (ref 0.45–4.5)
VLDLC SERPL CALC-MCNC: 18 MG/DL (ref 5–40)
WBC # BLD AUTO: 3.4 X10E3/UL (ref 3.4–10.8)

## 2021-06-11 NOTE — PROGRESS NOTES
Thyroid function normal  PSA: Normal  CBC:Normal red and white blood cell levels. CMP:Normal electrolyte levels except for an elevation in the glucose level, normal renal and liver function   Lipids: Improved  Your current lab results reveal a elevated ldl. Your total cholesterol should be under 200 and your ldl under 100. Work on following a low fat diet and exercise at least three times a week. A1c:Your current hgbA1c of 6.1 is lower than your last level of 6.3. Keep up the good work! Continue to work on following a diabetic diet and exercise. Recheck this test: hgbA1c  in  3 months. Continue with current  medications.

## 2021-06-14 ENCOUNTER — OFFICE VISIT (OUTPATIENT)
Dept: INTERNAL MEDICINE CLINIC | Age: 76
End: 2021-06-14

## 2021-06-14 VITALS
DIASTOLIC BLOOD PRESSURE: 83 MMHG | OXYGEN SATURATION: 96 % | BODY MASS INDEX: 20.44 KG/M2 | HEIGHT: 71 IN | HEART RATE: 67 BPM | TEMPERATURE: 98.4 F | WEIGHT: 146 LBS | RESPIRATION RATE: 14 BRPM | SYSTOLIC BLOOD PRESSURE: 154 MMHG

## 2021-06-14 DIAGNOSIS — Z85.118 HISTORY OF LUNG CANCER: ICD-10-CM

## 2021-06-14 DIAGNOSIS — E78.00 HYPERCHOLESTEROLEMIA: ICD-10-CM

## 2021-06-14 DIAGNOSIS — K21.9 GASTROESOPHAGEAL REFLUX DISEASE, UNSPECIFIED WHETHER ESOPHAGITIS PRESENT: Primary | ICD-10-CM

## 2021-06-14 DIAGNOSIS — I10 ESSENTIAL HYPERTENSION: ICD-10-CM

## 2021-06-14 DIAGNOSIS — J44.9 CHRONIC OBSTRUCTIVE PULMONARY DISEASE, UNSPECIFIED COPD TYPE (HCC): ICD-10-CM

## 2021-06-14 PROCEDURE — 99214 OFFICE O/P EST MOD 30 MIN: CPT | Performed by: FAMILY MEDICINE

## 2021-06-14 PROCEDURE — G8754 DIAS BP LESS 90: HCPCS | Performed by: FAMILY MEDICINE

## 2021-06-14 PROCEDURE — 1101F PT FALLS ASSESS-DOCD LE1/YR: CPT | Performed by: FAMILY MEDICINE

## 2021-06-14 PROCEDURE — G8510 SCR DEP NEG, NO PLAN REQD: HCPCS | Performed by: FAMILY MEDICINE

## 2021-06-14 PROCEDURE — G8420 CALC BMI NORM PARAMETERS: HCPCS | Performed by: FAMILY MEDICINE

## 2021-06-14 PROCEDURE — G8536 NO DOC ELDER MAL SCRN: HCPCS | Performed by: FAMILY MEDICINE

## 2021-06-14 PROCEDURE — G8753 SYS BP > OR = 140: HCPCS | Performed by: FAMILY MEDICINE

## 2021-06-14 PROCEDURE — G8427 DOCREV CUR MEDS BY ELIG CLIN: HCPCS | Performed by: FAMILY MEDICINE

## 2021-06-14 RX ORDER — AMLODIPINE BESYLATE 10 MG/1
TABLET ORAL
Qty: 90 TABLET | Refills: 1 | Status: SHIPPED | OUTPATIENT
Start: 2021-06-14 | End: 2021-12-16 | Stop reason: SDUPTHER

## 2021-06-14 RX ORDER — OMEPRAZOLE 40 MG/1
40 CAPSULE, DELAYED RELEASE ORAL DAILY
Qty: 90 CAPSULE | Refills: 0 | Status: SHIPPED | OUTPATIENT
Start: 2021-06-14 | End: 2021-12-16 | Stop reason: SDUPTHER

## 2021-06-14 RX ORDER — ATORVASTATIN CALCIUM 10 MG/1
TABLET, FILM COATED ORAL
Qty: 90 TABLET | Refills: 3 | Status: SHIPPED | OUTPATIENT
Start: 2021-06-14 | End: 2021-12-16 | Stop reason: SDUPTHER

## 2021-06-14 NOTE — PROGRESS NOTES
SUBJECTIVE:   Riya Burns is a 67 y.o. male who is here for a follow-up. Pt is accompanied by his wife who helps provide the information. Gastroesophageal reflux disease: Pt notes he does not always have an appetite. He endorses having more frequent reflux. Pt indicates having more fluctuance over the last few years. He denies any hematochezia, dark or tarry looking stool. Pt reports daily bowel movements. Pt admits to drinking more soda and water than usual over the last few years. Pt's wife indicates the pt's diet is not of the healthiest. She notes he eats liza sausages, SPAM, hotdogs, and bologna. COPD: Pt's wife remarks he has not been to the pulmonary doctor in a long time. He notes experiencing SOB d/t COPD. HTN: Pt's BP in office today was 154/83. Pt inquires what the best foods are to help with his blood pressure. Pt reports feeling well. He remarks feeling down when he does not have a lot to do in a day but when he does have plans he is active all day. Pt's wife reports he is in a lot of pain d/t falling on ice. He notes falling on his behind. She indicates he has to use pillows in chairs and in the bed. Pt's wife notes his nerves are getting worse. Preventative:   COVID-19: Completed     At this time, he is otherwise doing well and has brought no other complaints to my attention today. For a list of the medical issues addressed today, see the assessment and plan below.       PMH:   Past Medical History:   Diagnosis Date    Cancer [facility]) 2012    right lung SCC stage I    COPD     SIRS    Dyspepsia and other specified disorders of function of stomach     Essential hypertension     GERD (gastroesophageal reflux disease)     Hypercholesterolemia     Hyperlipidemia     Prediabetes 8/26/2015    Pseudogout 8/6/2012    Squamous cell carcinoma lung (HCC)        PSH:  has a past surgical history that includes pr chest surgery procedure unlisted; hx other surgical (10 26 12); hx other surgical (11/13/12); hx hernia repair (2011); hx hernia repair (5/20/13); colonoscopy,nell galvez (2/8/2018); and colonoscopy (N/A, 2/8/2018). Allergies: is allergic to lisinopril. Meds:   Current Outpatient Medications   Medication Sig    omeprazole (PRILOSEC) 40 mg capsule Take 1 Capsule by mouth daily.  amLODIPine (NORVASC) 10 mg tablet TAKE 1 TABLET EVERY DAY    atorvastatin (LIPITOR) 10 mg tablet TAKE 1 TABLET  NIGHTLY    budesonide-formoteroL (Symbicort) 160-4.5 mcg/actuation HFAA INHALE 1 PUFF TWICE DAILY    cyanocobalamin (VITAMIN B-12) 1,000 mcg tablet Take 2,500 mcg by mouth daily.  multivitamins-minerals-lutein (CENTRUM SILVER) Tab Take 1 Tab by mouth daily.  acetaminophen (TYLENOL EXTRA STRENGTH) 500 mg tablet Take 1,000 mg by mouth every six (6) hours as needed.  aspirin 81 mg chewable tablet Take 1 Tab by mouth daily.  tiZANidine (ZANAFLEX) 2 mg tablet Take 1 Tab by mouth daily. (Patient not taking: Reported on 1/6/2020)     No current facility-administered medications for this visit. Fam hx: family history includes Cancer in his brother and father; Dementia in his mother. Soc hx:  reports that he quit smoking about 8 years ago. He has a 67.50 pack-year smoking history. He has never used smokeless tobacco. He reports that he does not drink alcohol and does not use drugs.       Review of Systems - History obtained from the patient  General ROS: negative  Psychological ROS: negative  Ophthalmic ROS: negative  ENT ROS: negative  Respiratory ROS: no cough, + shortness of breath, or wheezing  Cardiovascular ROS: no chest pain or dyspnea on exertion  Gastrointestinal ROS: no abdominal pain, change in bowel habits, or black or bloody stools  Genito-Urinary ROS: negative  Musculoskeletal ROS: negative  Neurological ROS: negative  Dermatological ROS: negative    OBJECTIVE:   Vitals:   Visit Vitals  BP (!) 154/83 (BP 1 Location: Left arm, BP Patient Position: Sitting, BP Cuff Size: Adult)   Pulse 67   Temp 98.4 °F (36.9 °C) (Temporal)   Resp 14   Ht 5' 11\" (1.803 m)   Wt 146 lb (66.2 kg)   SpO2 96%   BMI 20.36 kg/m²     Gen: Pleasant 67 y.o. male in NAD. HEART: RRR, No M/G/R.     LUNGS: CTAB No W/R. ABDOMEN: S, NT, ND, BS+, epigastric tenderness on palpation. NEURO: Alert and oriented x 3. Cranial nerves grossly intact. No focal sensory or motor deficits noted. ASSESSMENT/ PLAN:     Diagnoses and all orders for this visit:    1. Gastroesophageal reflux disease, unspecified whether esophagitis present  -     omeprazole (PRILOSEC) 40 mg capsule; Take 1 Capsule by mouth daily.  -     OCCULT BLOOD IMMUNOASSAY,DIAGNOSTIC; Future    2. Chronic obstructive pulmonary disease, unspecified COPD type ([facility]  -     REFERRAL TO PULMONARY DISEASE  -     CT CHEST WO CONT; Future    3. Essential hypertension  -     amLODIPine (NORVASC) 10 mg tablet; TAKE 1 TABLET EVERY DAY    4. Hypercholesterolemia  -     atorvastatin (LIPITOR) 10 mg tablet; TAKE 1 TABLET  NIGHTLY    5. History of lung cancer  -     CT CHEST WO CONT; Future      1. Gastroesophageal reflux disease   I note the pt has lost weight. I believe he might have some gastritis. I will order a Cologuard for further evaluation for if an ulcer is present. Prescribed Prilosec 40 mg capsule daily. I advised the pt to watch his acid intake such as caffeine, tomato sauce, orange juice, spicy foods, and mints. I recommended taking two of his current dose until is new prescription arrives. Ordered occult blood immunoassay, diagnostic. 2. Chronic obstructive pulmonary disease   Referral for Dr. Trista Benjamin (Pulmonary Disease) for further evaluation of COPD. D/t previous cancer history, I believe he needs to continue to monitor his COPD. Ordered CT chest wo cont for further evaluation of COPD.      3. Essential Hypertension  I recommended continuing current dose of Norvasc 10 mg tablet daily, eating a low sodium diet, and increasing exercise. Recheck CMP. I advised the pt to make sure he is getting fresh fruits and vegetables in his diet and continue with low sodium diet. Refilled Norvasc 10 mg tablet. I advised him to take his BP at home a couple times a week. 4. Hypercholesterolemia  Refilled Lipitor 10 mg tablet. 5. History of lung cancer   Ordered CT chest wo cont for further evaluation. I reviewed the pt's most recent labs on 06/01/21 and note his PSA at 1.2 is within normal range, Thyroid is nl,  CBC is normal, CMP revealed normal electrolyte levels except for an elevation in the glucose level, normal renal and liver function, and an  A1c of 6.1 is lower than the last A1c level of 6.3. I am happy with his blood work results. I have reviewed the patient's medications and risks/side effects/benefits were discussed. Diagnosis(-es) explained to patient and questions answered. Literature provided where appropriate.      Written by Janelle Álvarez, as dictated by Wan Wilson MD.

## 2021-06-14 NOTE — PROGRESS NOTES
Reviewed record in preparation for visit and have obtained necessary documentation. Identified pt with two pt identifiers(name and ). Chief Complaint   Patient presents with    Follow-up     Check up        Health Maintenance Due   Topic Date Due    Hepatitis C Test  Never done    Shingles Vaccine (1 of 2) Never done    Annual Well Visit  2021       Mr. Fernando Bajwa has a reminder for a \"due or due soon\" health maintenance. I have asked that he discuss this further with his primary care provider for follow-up on this health maintenance. Coordination of Care Questionnaire:  :     1) Have you been to an emergency room, urgent care clinic since your last visit? no   Hospitalized since your last visit? no             2) Have you seen or consulted any other health care providers outside of [address] [place] [address] since your last visit? no  (Include any pap smears or colon screenings in this section.)    3) In the event something were to happen to you and you were unable to speak on your behalf, do you have an Advance Directive/ Living Will in place stating your wishes?

## 2021-06-19 LAB — HEMOCCULT STL QL IA: NEGATIVE

## 2021-06-22 ENCOUNTER — HOSPITAL ENCOUNTER (OUTPATIENT)
Dept: CT IMAGING | Age: 76
Discharge: HOME | End: 2021-06-22
Attending: FAMILY MEDICINE
Payer: MEDICARE

## 2021-06-22 DIAGNOSIS — J44.9 CHRONIC OBSTRUCTIVE PULMONARY DISEASE, UNSPECIFIED COPD TYPE (HCC): ICD-10-CM

## 2021-06-22 DIAGNOSIS — Z85.118 HISTORY OF LUNG CANCER: ICD-10-CM

## 2021-06-22 PROCEDURE — 71250 CT THORAX DX C-: CPT

## 2021-06-23 ENCOUNTER — TELEPHONE (OUTPATIENT)
Dept: INTERNAL MEDICINE CLINIC | Age: 76
End: 2021-06-23

## 2021-06-23 NOTE — TELEPHONE ENCOUNTER
Angie,  Please contact Dr. Leonard Zaragoza office. This patient is scheduled with to see him on July 27, 2021. His chest CT returned revealing a abnormal growth in the left lung. This patient has a history of lung cancer. He will need to have his appointment moved up. Please forward a copy of the CT scan to Dr. Leonard Zaragoza nurse. They also like a copy of his last office note. Patient was called this evening to discuss the results of the CT scan. I advised his wife that we will try to get an earlier appointment with Dr. Conrad Quintanilla.   The radiologist is recommending a biopsy of the area as well as a PET scan

## 2021-06-24 NOTE — TELEPHONE ENCOUNTER
Spoke with Dr. Carly Zaragoza' s office patient   will talk with  with the information about the CT result and history of lung cancer to get  A sooner appointment.

## 2021-06-28 NOTE — TELEPHONE ENCOUNTER
#[phone] wife Reynold Libman is asking for a call back pertaining to last weeks request for a call. She states she has been waiting for this call.

## 2021-06-30 ENCOUNTER — TELEPHONE (OUTPATIENT)
Dept: INTERNAL MEDICINE CLINIC | Age: 76
End: 2021-06-30

## 2021-06-30 NOTE — TELEPHONE ENCOUNTER
Please confirm that this patient has an appointment with the pulmonologist.  He was supposed to have an urgent appointment due to the abnormal chest CT.

## 2021-06-30 NOTE — TELEPHONE ENCOUNTER
Please check for appointment with pulmonary at [facility].

## 2021-08-04 ENCOUNTER — TRANSCRIBE ORDER (OUTPATIENT)
Dept: SCHEDULING | Age: 76
End: 2021-08-04

## 2021-08-04 DIAGNOSIS — R91.8 MASS OF UPPER LOBE OF LUNG: Primary | ICD-10-CM

## 2021-08-27 ENCOUNTER — HOSPITAL ENCOUNTER (OUTPATIENT)
Dept: PET IMAGING | Age: 76
Discharge: HOME | End: 2021-08-27
Attending: INTERNAL MEDICINE
Payer: MEDICARE

## 2021-08-27 DIAGNOSIS — R91.8 MASS OF UPPER LOBE OF LUNG: ICD-10-CM

## 2021-08-27 LAB
GLUCOSE BLD STRIP.AUTO-MCNC: 121 MG/DL (ref 65–117)
SERVICE CMNT-IMP: ABNORMAL

## 2021-08-27 PROCEDURE — A9552 F18 FDG: HCPCS

## 2021-08-27 RX ORDER — FLUDEOXYGLUCOSE F18 300 MCI/ML
10 INJECTION INTRAVENOUS ONCE
Status: COMPLETED | OUTPATIENT
Start: 2021-08-27 | End: 2021-08-27

## 2021-08-27 RX ADMIN — FLUDEOXYGLUCOSE F18 9.98 MILLICURIE: 300 INJECTION INTRAVENOUS at 10:05

## 2021-12-16 ENCOUNTER — VIRTUAL VISIT (OUTPATIENT)
Dept: INTERNAL MEDICINE CLINIC | Age: 76
End: 2021-12-16
Payer: MEDICARE

## 2021-12-16 ENCOUNTER — TELEPHONE (OUTPATIENT)
Dept: INTERNAL MEDICINE CLINIC | Age: 76
End: 2021-12-16

## 2021-12-16 DIAGNOSIS — J44.9 CHRONIC OBSTRUCTIVE PULMONARY DISEASE, UNSPECIFIED COPD TYPE (HCC): ICD-10-CM

## 2021-12-16 DIAGNOSIS — I10 ESSENTIAL HYPERTENSION: ICD-10-CM

## 2021-12-16 DIAGNOSIS — K21.9 GASTROESOPHAGEAL REFLUX DISEASE, UNSPECIFIED WHETHER ESOPHAGITIS PRESENT: ICD-10-CM

## 2021-12-16 DIAGNOSIS — E78.00 HYPERCHOLESTEROLEMIA: ICD-10-CM

## 2021-12-16 DIAGNOSIS — Z90.2 S/P PARTIAL LOBECTOMY OF LUNG: Primary | ICD-10-CM

## 2021-12-16 PROCEDURE — G2012 BRIEF CHECK IN BY MD/QHP: HCPCS | Performed by: FAMILY MEDICINE

## 2021-12-16 RX ORDER — OMEPRAZOLE 40 MG/1
40 CAPSULE, DELAYED RELEASE ORAL DAILY
Qty: 90 CAPSULE | Refills: 0 | Status: SHIPPED | OUTPATIENT
Start: 2021-12-16 | End: 2022-04-07

## 2021-12-16 RX ORDER — AMLODIPINE BESYLATE 10 MG/1
TABLET ORAL
Qty: 90 TABLET | Refills: 3 | Status: SHIPPED | OUTPATIENT
Start: 2021-12-16

## 2021-12-16 RX ORDER — ATORVASTATIN CALCIUM 10 MG/1
TABLET, FILM COATED ORAL
Qty: 90 TABLET | Refills: 3 | Status: SHIPPED | OUTPATIENT
Start: 2021-12-16

## 2021-12-16 NOTE — PROGRESS NOTES
April Frankel is a 67 y.o. male who was seen by synchronous (real-time) audio technology on 12/16/2021 for Follow-up (6 month )        Assessment & Plan:   Diagnoses and all orders for this visit:    1. S/P partial lobectomy of lung    2. Gastroesophageal reflux disease, unspecified whether esophagitis present  -     omeprazole (PRILOSEC) 40 mg capsule; Take 1 Capsule by mouth daily. 3. Hypercholesterolemia  -     atorvastatin (LIPITOR) 10 mg tablet; TAKE 1 TABLET  NIGHTLY    4. Essential hypertension  -     amLODIPine (NORVASC) 10 mg tablet; TAKE 1 TABLET EVERY DAY    5. Chronic obstructive pulmonary disease, unspecified COPD type ([facility]    1. S/p partial lobectomy of lung  Pt is followed by Dr. Kentrell Hudson (pulmonology) and has a follow-up appt next week. He is doing well post surgery. 2. GERD  Well controlled on omeprazole 40 mg daily. He will continue on current medication. 3. Hyperlipidemia   Lipid panel shows cholesterol seems to be well controlled. I recommended continuing current dose of atorvastatin, eating a low fat diet, and increasing exercise. 4. Hypertension  BP seems to be well controlled. I recommended continuing current dose of amlodipine, eating a low sodium diet, and increasing exercise. 5.COPD  Patient is stable and managed by a pulmonologist.    I spent at least 10 minutes on this visit with this established patient. Subjective: The pt calls today for a routine follow-up. GERD: Pt is taking omeprazole 40 mg daily which works well to control sxs. HLD: Pt is compliant in taking atorvastatin 10 mg daily. Patient denies abdominal pain, nausea, vomiting, diarrhea, arthralgias/myalgias due to the medication. HTN: Pt is compliant in taking amlodipine 10 mg daily. Patient denies chest pain, RODRIGUEZ/SOB, edema, headache, visual changes, dizziness, palpitations or syncope. Lung cancer: Pt is followed by Dr. Mecca Tam (cardiothoracic surgery [facility]) and Dr. Kentrell Hudson (pulmonology).  He is s/p RUL lobectomy 10 years ago with Dr. Mini Diamond for squamous cell carcinoma. He was found to have a L apical nodule on CT in 06/2021. PET scan showed increased metabolic uptake in apical nodule and indeterminate uptake in the L hilum. On 10/06/21 he underwent R VATS, RUL segmentectomy (lingula spared) with Dr. Mine France. He will see pulmonary on 12/20 to follow-up on surgery. Today, pt states that his breathing has been stable and his appetite has returned to normal. He continues to feel fatigued and have less energy. Prior to Admission medications    Medication Sig Start Date End Date Taking? Authorizing Provider   omeprazole (PRILOSEC) 40 mg capsule Take 1 Capsule by mouth daily. 12/16/21  Yes Yadiel Cash MD   atorvastatin (LIPITOR) 10 mg tablet TAKE 1 TABLET  NIGHTLY 12/16/21  Yes Yadiel Cash MD   amLODIPine (NORVASC) 10 mg tablet TAKE 1 TABLET EVERY DAY 12/16/21  Yes Yadiel Cash MD   budesonide-formoteroL (Symbicort) 160-4.5 mcg/actuation HFAA INHALE 1 PUFF TWICE DAILY 4/23/21   Ivett Rice III, DO   tiZANidine (ZANAFLEX) 2 mg tablet Take 1 Tab by mouth daily. Patient not taking: Reported on 1/6/2020 8/27/18   Yadiel Cash MD   cyanocobalamin (VITAMIN B-12) 1,000 mcg tablet Take 2,500 mcg by mouth daily. Provider, Historical   multivitamins-minerals-lutein (CENTRUM SILVER) Tab Take 1 Tab by mouth daily. Provider, Historical   acetaminophen (TYLENOL EXTRA STRENGTH) 500 mg tablet Take 1,000 mg by mouth every six (6) hours as needed. Provider, Historical   aspirin 81 mg chewable tablet Take 1 Tab by mouth daily.  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 90 Capsule 0    atorvastatin (LIPITOR) 10 mg tablet TAKE 1 TABLET  NIGHTLY 90 Tablet 3    amLODIPine (NORVASC) 10 mg tablet TAKE 1 TABLET EVERY DAY 90 Tablet 3    budesonide-formoteroL (Symbicort) 160-4.5 mcg/actuation HFAA INHALE 1 PUFF TWICE DAILY 1 Inhaler 5    tiZANidine (ZANAFLEX) 2 mg tablet Take 1 Tab by mouth daily. (Patient not taking: Reported on 1/6/2020) 30 Tab 1    cyanocobalamin (VITAMIN B-12) 1,000 mcg tablet Take 2,500 mcg by mouth daily.  multivitamins-minerals-lutein (CENTRUM SILVER) Tab Take 1 Tab by mouth daily.  acetaminophen (TYLENOL EXTRA STRENGTH) 500 mg tablet Take 1,000 mg by mouth every six (6) hours as needed.  aspirin 81 mg chewable tablet Take 1 Tab by mouth daily. 30 Tab 1     Allergies   Allergen Reactions    Lisinopril Angioedema     Entire face swells     Past Medical History:   Diagnosis Date    Cancer ([facility] 2012    right lung SCC stage I    COPD     SIRS    Dyspepsia and other specified disorders of function of stomach     Essential hypertension     GERD (gastroesophageal reflux disease)     Hypercholesterolemia     Hyperlipidemia     Prediabetes 8/26/2015    Pseudogout 8/6/2012    Squamous cell carcinoma lung ([facility]      Past Surgical History:   Procedure Laterality Date    COLONOSCOPY N/A 2/8/2018    COLONOSCOPY performed by Osei Teague MD at [address] [place] [address]  2/8/2018         [address]  2011    gene.     HX HERNIA REPAIR  5/20/13     BILATERAL OPEN INGUINAL HERNIA REPAIR WITH MESH    HX OTHER SURGICAL  10 26 12    EBUS    HX OTHER SURGICAL  12    BRONCHSCOPY,RIGHT THORACOSCOPY,THORACOSCOPIC RIGHT UPPER LUNG  LOBE RESECTION, MEDIASTINAL LYMPH NODE DISSECTION; INTERCOSTAL NERVE BLOCK    DE CHEST SURGERY PROCEDURE UNLISTED      see history under other below     Family History   Problem Relation Age of Onset    Dementia Mother     Cancer Father         lung    Cancer Brother         throat     Social History     Tobacco Use    Smoking status: Former Smoker     Packs/day: 1.50     Years: 45.00     Pack years: 67.50     Quit date: 2012     Years since quittin.3    Smokeless tobacco: Never Used   Substance Use Topics    Alcohol use: No       Review of Systems   Constitutional: Positive for malaise/fatigue. HENT: Negative. Eyes: Negative. Respiratory: Negative. Cardiovascular: Negative. Gastrointestinal: Negative. Genitourinary: Negative. Musculoskeletal: Negative. Skin: Negative. Neurological: Negative. Endo/Heme/Allergies: Negative. Psychiatric/Behavioral: Negative. Objective:   No flowsheet data found. [INSTRUCTIONS:  \"[x]\" Indicates a positive item  \"[]\" Indicates a negative item  -- DELETE ALL ITEMS NOT EXAMINED]    Constitutional: [x] No apparent distress      [] Abnormal -     Mental status: [x] Alert and awake  [x] Oriented to person/place/time [x] Able to follow commands    [] Abnormal -            Psychiatric:       [x] Normal Affect [] Abnormal -        [x] No Hallucinations    Other pertinent observable physical exam findings:-        We discussed the expected course, resolution and complications of the diagnosis(es) in detail. Medication risks, benefits, costs, interactions, and alternatives were discussed as indicated. I advised him to contact the office if his condition worsens, changes or fails to improve as anticipated. He expressed understanding with the diagnosis(es) and plan. Trevon Hernandez, was evaluated through a synchronous (real-time) audio-video encounter. The patient (or guardian if applicable) is aware that this is a billable service. Verbal consent to proceed has been obtained within the past 12 months. The visit was conducted pursuant to the emergency declaration under the [address] [place] [address] authority and the [employer] and [employer] Act. Patient identification was verified, and a caregiver was present when appropriate. The patient was located in a state where the provider was credentialed to provide care.     Written by Sarahy Salcedo, as dictated by Dr. Cheng Tyler MD.      Cesar Martin MD

## 2022-02-11 ENCOUNTER — TRANSCRIBE ORDER (OUTPATIENT)
Dept: SCHEDULING | Age: 77
End: 2022-02-11

## 2022-02-11 DIAGNOSIS — C34.12 PRIMARY MALIGNANT NEOPLASM OF BRONCHUS OF LEFT UPPER LOBE (HCC): Primary | ICD-10-CM

## 2022-03-18 PROBLEM — Z85.89 HISTORY OF SQUAMOUS CELL CARCINOMA: Status: ACTIVE | Noted: 2020-11-13

## 2022-03-18 PROBLEM — J44.9 CHRONIC OBSTRUCTIVE PULMONARY DISEASE (HCC): Status: ACTIVE | Noted: 2018-02-26

## 2022-03-19 PROBLEM — H25.812 COMBINED FORMS OF AGE-RELATED CATARACT OF LEFT EYE: Status: ACTIVE | Noted: 2017-04-06

## 2022-03-19 PROBLEM — H57.03 INTRAOPERATIVE MIOSIS: Status: ACTIVE | Noted: 2017-04-12

## 2022-03-19 PROBLEM — H59.88 INTRAOPERATIVE MIOSIS: Status: ACTIVE | Noted: 2017-04-12

## 2022-03-19 PROBLEM — H25.811 COMBINED FORMS OF AGE-RELATED CATARACT OF RIGHT EYE: Status: ACTIVE | Noted: 2017-04-04

## 2022-04-01 ENCOUNTER — HOSPITAL ENCOUNTER (OUTPATIENT)
Dept: CT IMAGING | Age: 77
Discharge: HOME | End: 2022-04-01
Attending: INTERNAL MEDICINE
Payer: MEDICARE

## 2022-04-01 DIAGNOSIS — C34.12 PRIMARY MALIGNANT NEOPLASM OF BRONCHUS OF LEFT UPPER LOBE (HCC): ICD-10-CM

## 2022-04-01 PROCEDURE — 71250 CT THORAX DX C-: CPT

## 2022-04-04 DIAGNOSIS — K21.9 GASTROESOPHAGEAL REFLUX DISEASE, UNSPECIFIED WHETHER ESOPHAGITIS PRESENT: ICD-10-CM

## 2022-04-07 RX ORDER — OMEPRAZOLE 40 MG/1
CAPSULE, DELAYED RELEASE ORAL
Qty: 90 CAPSULE | Refills: 0 | Status: SHIPPED | OUTPATIENT
Start: 2022-04-07 | End: 2022-07-04

## 2022-07-02 DIAGNOSIS — K21.9 GASTROESOPHAGEAL REFLUX DISEASE, UNSPECIFIED WHETHER ESOPHAGITIS PRESENT: ICD-10-CM

## 2022-07-04 RX ORDER — OMEPRAZOLE 40 MG/1
CAPSULE, DELAYED RELEASE ORAL
Qty: 90 CAPSULE | Refills: 0 | Status: SHIPPED | OUTPATIENT
Start: 2022-07-04

## 2022-09-01 ENCOUNTER — TRANSCRIBE ORDER (OUTPATIENT)
Dept: SCHEDULING | Age: 77
End: 2022-09-01

## 2022-09-01 DIAGNOSIS — C34.12 PRIMARY CANCER OF LEFT UPPER LOBE OF LUNG (HCC): Primary | ICD-10-CM

## 2022-09-13 ENCOUNTER — HOSPITAL ENCOUNTER (OUTPATIENT)
Dept: CT IMAGING | Age: 77
Discharge: HOME | End: 2022-09-13
Attending: INTERNAL MEDICINE
Payer: MEDICARE

## 2022-09-13 DIAGNOSIS — C34.12 PRIMARY CANCER OF LEFT UPPER LOBE OF LUNG (HCC): ICD-10-CM

## 2022-09-13 PROCEDURE — 71250 CT THORAX DX C-: CPT

## 2022-10-31 ENCOUNTER — TRANSCRIBE ORDER (OUTPATIENT)
Dept: SCHEDULING | Age: 77
End: 2022-10-31

## 2022-10-31 DIAGNOSIS — C34.12 PRIMARY CANCER OF LEFT UPPER LOBE OF LUNG (HCC): Primary | ICD-10-CM

## 2023-01-31 DIAGNOSIS — K21.9 GASTROESOPHAGEAL REFLUX DISEASE, UNSPECIFIED WHETHER ESOPHAGITIS PRESENT: ICD-10-CM

## 2023-01-31 DIAGNOSIS — I10 ESSENTIAL HYPERTENSION: ICD-10-CM

## 2023-01-31 RX ORDER — OMEPRAZOLE 40 MG/1
CAPSULE, DELAYED RELEASE ORAL
Qty: 90 CAPSULE | Refills: 0 | Status: SHIPPED | OUTPATIENT
Start: 2023-01-31

## 2023-02-03 RX ORDER — AMLODIPINE BESYLATE 10 MG/1
TABLET ORAL
Qty: 90 TABLET | Refills: 3 | Status: SHIPPED | OUTPATIENT
Start: 2023-02-03

## 2023-02-09 ENCOUNTER — OFFICE VISIT (OUTPATIENT)
Dept: INTERNAL MEDICINE CLINIC | Age: 78
End: 2023-02-09
Payer: MEDICARE

## 2023-02-09 VITALS
TEMPERATURE: 98.4 F | DIASTOLIC BLOOD PRESSURE: 81 MMHG | WEIGHT: 142 LBS | HEIGHT: 71 IN | HEART RATE: 69 BPM | BODY MASS INDEX: 19.88 KG/M2 | RESPIRATION RATE: 17 BRPM | SYSTOLIC BLOOD PRESSURE: 129 MMHG | OXYGEN SATURATION: 98 %

## 2023-02-09 DIAGNOSIS — J44.9 CHRONIC OBSTRUCTIVE PULMONARY DISEASE, UNSPECIFIED COPD TYPE (HCC): ICD-10-CM

## 2023-02-09 DIAGNOSIS — I10 PRIMARY HYPERTENSION: ICD-10-CM

## 2023-02-09 DIAGNOSIS — Z00.00 MEDICARE ANNUAL WELLNESS VISIT, SUBSEQUENT: Primary | ICD-10-CM

## 2023-02-09 DIAGNOSIS — C34.12 PRIMARY CANCER OF LEFT UPPER LOBE OF LUNG (HCC): ICD-10-CM

## 2023-02-09 DIAGNOSIS — Z12.11 SCREEN FOR COLON CANCER: ICD-10-CM

## 2023-02-09 DIAGNOSIS — R73.09 ELEVATED HEMOGLOBIN A1C: ICD-10-CM

## 2023-02-09 DIAGNOSIS — Z13.6 SCREENING FOR ISCHEMIC HEART DISEASE: ICD-10-CM

## 2023-02-09 DIAGNOSIS — Z12.5 SPECIAL SCREENING FOR MALIGNANT NEOPLASM OF PROSTATE: ICD-10-CM

## 2023-02-09 NOTE — PROGRESS NOTES
This is the Subsequent Medicare Annual Wellness Exam, performed 12 months or more after the Initial AWV or the last Subsequent AWV    I have reviewed the patient's medical history in detail and updated the computerized patient record. Assessment/Plan   Education and counseling provided:  Are appropriate based on today's review and evaluation  End-of-Life planning (with patient's consent)  Pneumococcal Vaccine  Prostate cancer screening tests (PSA, covered annually)  Colorectal cancer screening tests  Cardiovascular screening blood test  Screening for glaucoma    1. Medicare annual wellness visit, subsequent  2. Primary hypertension  -     METABOLIC PANEL, COMPREHENSIVE; Future  -     CBC WITH AUTOMATED DIFF; Future  3. Elevated hemoglobin Y4J  -     METABOLIC PANEL, COMPREHENSIVE; Future  -     HEMOGLOBIN A1C WITH EAG; Future  4. Primary cancer of left upper lobe of lung ([facility]  Assessment & Plan:   monitored by specialist. No acute findings meriting change in the plan  5. Chronic obstructive pulmonary disease, unspecified COPD type ([facility]  Assessment & Plan:   monitored by specialist. No acute findings meriting change in the plan  6. Screen for colon cancer  -     COLOGUARD TEST (FECAL DNA COLORECTAL CANCER SCREENING); Future  7. Screening for ischemic heart disease  -     LIPID PANEL; Future  8. Special screening for malignant neoplasm of prostate  Comments:  Discussed the potential benefits and harms of the prostate-specific antigen (PSA) serum level test as a screening tool for early prostate cancer detection. Orders:  -     PSA SCREENING (SCREENING);  Future       Depression Risk Factor Screening     3 most recent PHQ Screens 2/9/2023   Little interest or pleasure in doing things Not at all   Feeling down, depressed, irritable, or hopeless Not at all   Total Score PHQ 2 0       Alcohol & Drug Abuse Risk Screen    Do you average more than 1 drink per night or more than 7 drinks a week: No    In the past three months have you have had more than 4 drinks containing alcohol on one occasion: No          Functional Ability and Level of Safety    Hearing: Hearing is good. Activities of Daily Living: The home contains: no safety equipment. Take care of self      Ambulation: with no difficulty     Fall Risk:  Fall Risk Assessment, last 12 mths 2/9/2023   Able to walk? Yes   Fall in past 12 months? 0   Do you feel unsteady? 0   Are you worried about falling 0   Is the gait abnormal? -   Number of falls in past 12 months -   Fall with injury?  -      Abuse Screen:  Patient is not abused       Cognitive Screening    Has your family/caregiver stated any concerns about your memory: no     MMSE 25/30  Clock test 4/4    Health Maintenance Due     Health Maintenance Due   Topic Date Due    Hepatitis C Screening  Never done    Shingles Vaccine (1 of 2) Never done    Low dose CT lung screening  Never done    COVID-19 Vaccine (3 - Booster for Moderna series) 05/19/2021    Lipid Screen  06/01/2022    Depression Screen  06/14/2022    Flu Vaccine (1) 08/01/2022       Patient Care Team   Patient Care Team:  Emir Lopez MD as PCP - General (Family Medicine)  Emir Lopez MD as PCP - [facility]  Perez Fried MD (General Surgery)  Ashanti Sales MD (Gastroenterology)  Anjali Ling MD (Thoracic Surgery)  Guero Hawk MD (Hematology and Oncology)  Regina Mccracken DO (Ophthalmology)    History     Patient Active Problem List   Diagnosis Code    Cellulitis L03.90    HTN (hypertension) I10    Smoking F17.200    Pseudogout M11.20    History of colonic polyps Z86.010    Hypercholesterolemia E78.00    Inguinal hernia K40.90    Prediabetes R73.03    Combined forms of age-related cataract of right eye H25.811    Combined forms of age-related cataract of left eye H25.812    Intraoperative miosis H59.88, H57.03    Chronic obstructive pulmonary disease (HCC) J44.9    History of squamous cell carcinoma Z85.89 Primary cancer of left upper lobe of lung ([facility] C34.12     Past Medical History:   Diagnosis Date    Cancer ([facility] 2012    right lung SCC stage I    COPD     SIRS    Dyspepsia and other specified disorders of function of stomach     Essential hypertension     GERD (gastroesophageal reflux disease)     Hypercholesterolemia     Hyperlipidemia     Prediabetes 8/26/2015    Pseudogout 8/6/2012    Squamous cell carcinoma lung ([facility]       Past Surgical History:   Procedure Laterality Date    COLONOSCOPY N/A 2/8/2018    COLONOSCOPY performed by Thor Monaco MD at [facility]  2/8/2018         HX HERNIA REPAIR  2011    gene. HX HERNIA REPAIR  5/20/13     BILATERAL OPEN INGUINAL HERNIA REPAIR WITH MESH    HX OTHER SURGICAL  10 26 12    EBUS    HX OTHER SURGICAL  11/13/12    BRONCHSCOPY,RIGHT THORACOSCOPY,THORACOSCOPIC RIGHT UPPER LUNG  LOBE RESECTION, MEDIASTINAL LYMPH NODE DISSECTION; INTERCOSTAL NERVE BLOCK    SD UNLISTED PROCEDURE LUNGS & PLEURA      see history under other below     Current Outpatient Medications   Medication Sig Dispense Refill    amLODIPine (NORVASC) 10 mg tablet TAKE 1 TABLET EVERY DAY 90 Tablet 3    omeprazole (PRILOSEC) 40 mg capsule TAKE 1 CAPSULE EVERY DAY 90 Capsule 0    atorvastatin (LIPITOR) 10 mg tablet TAKE 1 TABLET  NIGHTLY 90 Tablet 3    budesonide-formoteroL (Symbicort) 160-4.5 mcg/actuation HFAA INHALE 1 PUFF TWICE DAILY 1 Inhaler 5    cyanocobalamin 1,000 mcg tablet Take 2,500 mcg by mouth daily. multivitamins-minerals-lutein (MEN'S CENTRUM SILVER WITH LUTEIN) tab tablet Take 1 Tab by mouth daily. acetaminophen (TYLENOL) 500 mg tablet Take 1,000 mg by mouth every six (6) hours as needed. aspirin 81 mg chewable tablet Take 1 Tab by mouth daily. 30 Tab 1    tiZANidine (ZANAFLEX) 2 mg tablet Take 1 Tab by mouth daily.  (Patient not taking: No sig reported) 30 Tab 1     Allergies   Allergen Reactions    Lisinopril Angioedema     Entire face roque       Family History   Problem Relation Age of Onset    Dementia Mother     Cancer Father         lung    Cancer Brother         throat     Social History     Tobacco Use    Smoking status: Former     Packs/day: 1.50     Years: 45.00     Pack years: 67.50     Types: Cigarettes     Quit date: 8/2/2012     Years since quitting: 10.5    Smokeless tobacco: Never   Substance Use Topics    Alcohol use: No         Antonio Dixon MD

## 2023-02-09 NOTE — PATIENT INSTRUCTIONS
Medicare Wellness Visit, Male    The best way to live healthy is to have a lifestyle where you eat a well-balanced diet, exercise regularly, limit alcohol use, and quit all forms of tobacco/nicotine, if applicable. Regular preventive services are another way to keep healthy. Preventive services (vaccines, screening tests, monitoring & exams) can help personalize your care plan, which helps you manage your own care. Screening tests can find health problems at the earliest stages, when they are easiest to treat. [facility] follows the current, evidence-based guidelines published by the [facility] ([facility]) when recommending preventive services for our patients. Because we follow these guidelines, sometimes recommendations change over time as research supports it. (For example, a prostate screening blood test is no longer routinely recommended for men with no symptoms). Of course, you and your doctor may decide to screen more often for some diseases, based on your risk and co-morbidities (chronic disease you are already diagnosed with). Preventive services for you include:  - Medicare offers their members a free annual wellness visit, which is time for you and your primary care provider to discuss and plan for your preventive service needs.  Take advantage of this benefit every year!    -Over the age of 72 should receive the recommended pneumonia vaccines.   -All adults should have a flu vaccine yearly.  -All adults should receive a tetanus vaccine every 10 years.  -Over the age of 48 should receive the shingles vaccines.    -All adults should be screened once for Hepatitis C.  -All adults age 38-68 who are overweight should have a diabetes screening test once every three years.   -Other screening tests & preventive services for persons with diabetes include: an eye exam to screen for diabetic retinopathy, a kidney function test, a foot exam, and stricter control over your cholesterol.   -Cardiovascular screening for adults with routine risk involves an electrocardiogram (ECG) at intervals determined by the provider.     -Colorectal cancer screening should be done for adults age 43-69 with no increased risk factors for colorectal cancer. There are a number of acceptable methods of screening for this type of cancer. Each test has its own benefits and drawbacks. Discuss with your provider what is most appropriate for you during your annual wellness visit. The different tests include: colonoscopy (considered the best screening method), a fecal occult blood test, a fecal DNA test, and sigmoidoscopy.    -Lung cancer screening is recommended annually with a low dose CT scan for adults between age 54 and 68, who have smoked at least 30 pack years (equivalent of 1 pack per day for 30 days), and who is a current smoker or quit less than 15 years ago. -An Abdominal Aortic Aneurysm (AAA) Screening is recommended for men age 73-68 who has ever smoked in their lifetime.      Here is a list of your current Health Maintenance items (your personalized list of preventive services) with a due date:  Health Maintenance Due   Topic Date Due    Hepatitis C Test  Never done    Shingles Vaccine (1 of 2) Never done    Smoker or Former Smoker - [address]  Never done    COVID-19 Vaccine (3 - Booster for Wallie Salon series) 05/19/2021    Cholesterol Test   06/01/2022    Depresssion Screening  06/14/2022    Yearly Flu Vaccine (1) 08/01/2022

## 2023-02-09 NOTE — PROGRESS NOTES
1. \"Have you been to the ER, urgent care clinic since your last visit? Hospitalized since your last visit? \" No    2. \"Have you seen or consulted any other health care providers outside of the [address] [place] [address] since your last visit? \" No     3. For patients aged 39-70: Has the patient had a colonoscopy / FIT/ Cologuard?  NA - based on age

## 2023-02-09 NOTE — ACP (ADVANCE CARE PLANNING)
Advance Care Planning     General Advance Care Planning (ACP) Conversation      Date of Conversation: 2/9/2023  Conducted with: Patient with Decision Making Capacity    Healthcare Decision Maker:   No healthcare decision makers have been documented.    Click here to complete [address] including selection of the Healthcare Decision Maker Relationship (ie \"Primary\")        Content/Action Overview:   Has NO ACP documents/care preferences - requested patient complete ACP documents  Reviewed DNR/DNI and patient          Length of Voluntary ACP Conversation in minutes:  <16 minutes (Non-Billable)    Ghassna Baer MD

## 2023-02-09 NOTE — PROGRESS NOTES
SUBJECTIVE:   Mr. Sandra Kirk is a 65 y.o. male who is here for follow up of routine medical issues. Pt presents for MAWV. He is accompanied by his wife. HTN: Pt is compliant in taking amlodipine 10 mg. Pt's BP in the office today was 129/81. Patient denies chest pain, RODRIGUEZ/SOB, edema, headache, visual changes, dizziness, palpitations or syncope. History of Lung Cancer: Pt has a history of left upper lobe lung cancer. He will be seeing  next month for a CT. He will follow up again in April. Pt notes that his breathing has not changed significantly and he uses his inhaler when needed. He indicates that his energy level depends on the day and he generally tries to keep himself busy. Memory Changes: He reports changing memory. Pt finds himself forgetting why he walked into a room and forgetting to do tasks. He states this is infrequent, but his wife notes this happens often and she has noticed a significant difference. Pt reports eating greens and stating hydrated. He denies issues with his reflux. He notes improvement in his vision after cataract removal. Pt reports multiple seborrheic keratoses on his back, but nothing new or abnormal.    Pt specifically denies changes in vision or hearing, trouble with swallowing or taste, CP, SOB, heartburn or upset stomach, appetite changes, change in bowel habits, problems urinating, unusual joint or muscle pains, numbness or tingling in extremities, or skin lesions of concern. PREVENTIVE:  Colonoscopy: 9/13/2012  PSA: 6/1/2021  Pneumonia vaccine: UTD  Shingrix: UTD  Flu: UTD  Eye Exam: history of bilateral cataract extraction      At this time, he is otherwise doing well and has brought no other complaints to my attention today. For a list of the medical issues addressed today, see the assessment and plan below.     PMH:   Past Medical History:   Diagnosis Date    Cancer ([facility] [address] 2012    right lung SCC stage I    COPD     SIRS    Dyspepsia and other specified disorders of function of stomach     Essential hypertension     GERD (gastroesophageal reflux disease)     Hypercholesterolemia     Hyperlipidemia     Prediabetes 8/26/2015    Pseudogout 8/6/2012    Squamous cell carcinoma lung (HCC)      PSH:  has a past surgical history that includes pr unlisted procedure lungs & pleura; hx other surgical (10 26 12); hx other surgical (11/13/12); hx hernia repair (2011); hx hernia repair (5/20/13); colonoscopy,remv lesn,snare (2/8/2018); and colonoscopy (N/A, 2/8/2018). All: is allergic to lisinopril. MEDS:   Current Outpatient Medications   Medication Sig    amLODIPine (NORVASC) 10 mg tablet TAKE 1 TABLET EVERY DAY    omeprazole (PRILOSEC) 40 mg capsule TAKE 1 CAPSULE EVERY DAY    atorvastatin (LIPITOR) 10 mg tablet TAKE 1 TABLET  NIGHTLY    budesonide-formoteroL (Symbicort) 160-4.5 mcg/actuation HFAA INHALE 1 PUFF TWICE DAILY    cyanocobalamin 1,000 mcg tablet Take 2,500 mcg by mouth daily. multivitamins-minerals-lutein (MEN'S CENTRUM SILVER WITH LUTEIN) tab tablet Take 1 Tab by mouth daily. acetaminophen (TYLENOL) 500 mg tablet Take 1,000 mg by mouth every six (6) hours as needed. aspirin 81 mg chewable tablet Take 1 Tab by mouth daily. tiZANidine (ZANAFLEX) 2 mg tablet Take 1 Tab by mouth daily. (Patient not taking: No sig reported)     No current facility-administered medications for this visit. FH: family history includes Cancer in his brother and father; Dementia in his mother. SH:  reports that he quit smoking about 10 years ago. He has a 67.50 pack-year smoking history. He has never used smokeless tobacco. He reports that he does not drink alcohol and does not use drugs.      Review of Systems - History obtained from the patient  General ROS: no fever, chills, fatigue, body aches  Psychological ROS: no change in anxiety, depression, SI/HI  Ophthalmic ROS: no blurred vision, myopia, double vision  ENT ROS: no dysphagia, otalgia, otorrhea, rhinorrhea, post nasal drip  Respiratory ROS: no cough, shortness of breath, or wheezing  Cardiovascular ROS: no chest pain or dyspnea on exertion  Gastrointestinal ROS: no abdominal pain, change in bowel habits, or black or bloody stools  Genito-Urinary ROS: no frequency, urgency, incontinence, dysuria, hematouria  Musculoskeletal ROS: no arthralagia, myalgia  Neurological ROS: no headaches, dizziness, lightheadedness, tremors, seizures  Dermatological ROS: no rash or lesions    OBJECTIVE:   Vitals: Visit Vitals  /81 (BP 1 Location: Left upper arm, BP Patient Position: Sitting, BP Cuff Size: Adult)   Pulse 69   Temp 98.4 °F (36.9 °C) (Temporal)   Resp 17   Ht 5' 11\" (1.803 m)   Wt 142 lb (64.4 kg)   SpO2 98%   BMI 19.80 kg/m²      Gen: Pleasant 65 y.o.  male in NAD. HEENT: PERRLA. EOMI. OP moist and pink. +mild bilateral cerumen impaction   Neck: Supple. No LAD. HEART: RRR, No M/G/R.      LUNGS: CTAB No W/R. ABDOMEN: S, NT, ND, BS+. EXTREMITIES: Warm. No C/C/E.    MUSCULOSKELETAL: Normal ROM, muscle strength 5/5 all groups. NEURO: Alert and oriented x 3. Cranial nerves grossly intact. No focal sensory or motor deficits noted. SKIN: Warm. Dry. No rashes or other lesions noted. ASSESSMENT/ PLAN: Diagnoses and all orders for this visit:    1. Medicare annual wellness visit, subsequent    2. Primary hypertension  -     METABOLIC PANEL, COMPREHENSIVE; Future  -     CBC WITH AUTOMATED DIFF; Future    3. Elevated hemoglobin Q4X  -     METABOLIC PANEL, COMPREHENSIVE; Future  -     HEMOGLOBIN A1C WITH EAG; Future    4. Primary cancer of left upper lobe of lung ([facility] [address]  Assessment & Plan:   monitored by specialist. No acute findings meriting change in the plan      5. Chronic obstructive pulmonary disease, unspecified COPD type ([facility] [address]  Assessment & Plan:   monitored by specialist. No acute findings meriting change in the plan      6.  Screen for colon cancer  -     COLOGUARD TEST (FECAL DNA COLORECTAL CANCER SCREENING); Future    7. Screening for ischemic heart disease  -     LIPID PANEL; Future    8. Special screening for malignant neoplasm of prostate  Comments:  Discussed the potential benefits and harms of the prostate-specific antigen (PSA) serum level test as a screening tool for early prostate cancer detection. Orders:  -     PSA SCREENING (SCREENING); Future    1. Medicare annual wellness visit, subsequent  1. Medicare Annual Wellness Visit  See attached note. I conducted a MMSE, which showed few abnormalities with his memory. The MMSE score is 25 out of 30 and the clock test is 4 out of 4.    2. Primary hypertension  BP seems to be well controlled. I recommended continuing current dose of medications, eating a low sodium diet, and increasing exercise. Recheck CMP and CBC.     -     METABOLIC PANEL, COMPREHENSIVE; Future  -     CBC WITH AUTOMATED DIFF; Future    3. Elevated hemoglobin A1c  Pt's blood sugar seems to be well controlled. I advised pt to continue current dose of medications, avoid sugars and starches, and to increase exercise when possible. Recheck hemoglobin A1c and CMP.     -     METABOLIC PANEL, COMPREHENSIVE; Future  -     HEMOGLOBIN A1C WITH EAG; Future    4. Primary cancer of left upper lobe of lung ([facility] [address]  Assessment & Plan:   monitored by specialist. No acute findings meriting change in the plan      5. Chronic obstructive pulmonary disease, unspecified COPD type ([facility] [address]  Assessment & Plan:   monitored by specialist. No acute findings meriting change in the plan      6. Screen for colon cancer  I have ordered a cologuard test for him for colorectal cancer screening.  -     COLOGUARD TEST (FECAL DNA COLORECTAL CANCER SCREENING); Future    7. Screening for ischemic heart disease  -     LIPID PANEL; Future    8.  Special screening for malignant neoplasm of prostate  Comments:  Discussed the potential benefits and harms of the prostate-specific antigen (PSA) serum level test as a screening tool for early prostate cancer detection. Orders:  -     PSA SCREENING (SCREENING); Future    Follow-up and Dispositions    Return in about 6 months (around 8/9/2023), or if symptoms worsen or fail to improve. I have reviewed the patient's medications and risks/side effects/benefits were discussed. Diagnosis(-es) explained to patient and questions answered. Literature provided where appropriate.      Written by Darylene Bolk, as dictated by Katie Martinez MD.

## 2023-02-10 LAB
ALBUMIN SERPL-MCNC: 4.4 G/DL (ref 3.5–5)
ALBUMIN/GLOB SERPL: 1.4 (ref 1.1–2.2)
ALP SERPL-CCNC: 10 U/L (ref 45–117)
ALT SERPL-CCNC: 21 U/L (ref 12–78)
ANION GAP SERPL CALC-SCNC: 5 MMOL/L (ref 5–15)
AST SERPL-CCNC: 20 U/L (ref 15–37)
BASOPHILS # BLD: 0 K/UL (ref 0–0.1)
BASOPHILS NFR BLD: 1 % (ref 0–1)
BILIRUB SERPL-MCNC: 0.6 MG/DL (ref 0.2–1)
BUN SERPL-MCNC: 15 MG/DL (ref 6–20)
BUN/CREAT SERPL: 18 (ref 12–20)
CALCIUM SERPL-MCNC: 9.5 MG/DL (ref 8.5–10.1)
CHLORIDE SERPL-SCNC: 105 MMOL/L (ref 97–108)
CHOLEST SERPL-MCNC: 219 MG/DL
CO2 SERPL-SCNC: 29 MMOL/L (ref 21–32)
CREAT SERPL-MCNC: 0.83 MG/DL (ref 0.7–1.3)
DIFFERENTIAL METHOD BLD: ABNORMAL
EOSINOPHIL # BLD: 0.1 K/UL (ref 0–0.4)
EOSINOPHIL NFR BLD: 1 % (ref 0–7)
ERYTHROCYTE [DISTWIDTH] IN BLOOD BY AUTOMATED COUNT: 13.4 % (ref 11.5–14.5)
EST. AVERAGE GLUCOSE BLD GHB EST-MCNC: 123 MG/DL
GLOBULIN SER CALC-MCNC: 3.1 G/DL (ref 2–4)
GLUCOSE SERPL-MCNC: 120 MG/DL (ref 65–100)
HBA1C MFR BLD: 5.9 % (ref 4–5.6)
HCT VFR BLD AUTO: 45.4 % (ref 36.6–50.3)
HDLC SERPL-MCNC: 49 MG/DL
HDLC SERPL: 4.5 (ref 0–5)
HGB BLD-MCNC: 14.7 G/DL (ref 12.1–17)
IMM GRANULOCYTES # BLD AUTO: 0 K/UL (ref 0–0.04)
IMM GRANULOCYTES NFR BLD AUTO: 0 % (ref 0–0.5)
LDLC SERPL CALC-MCNC: 154 MG/DL (ref 0–100)
LYMPHOCYTES # BLD: 0.8 K/UL (ref 0.8–3.5)
LYMPHOCYTES NFR BLD: 23 % (ref 12–49)
MCH RBC QN AUTO: 31.4 PG (ref 26–34)
MCHC RBC AUTO-ENTMCNC: 32.4 G/DL (ref 30–36.5)
MCV RBC AUTO: 97 FL (ref 80–99)
MONOCYTES # BLD: 0.4 K/UL (ref 0–1)
MONOCYTES NFR BLD: 12 % (ref 5–13)
NEUTS SEG # BLD: 2.2 K/UL (ref 1.8–8)
NEUTS SEG NFR BLD: 63 % (ref 32–75)
NRBC # BLD: 0 K/UL (ref 0–0.01)
NRBC BLD-RTO: 0 PER 100 WBC
PLATELET # BLD AUTO: 310 K/UL (ref 150–400)
PMV BLD AUTO: 9.8 FL (ref 8.9–12.9)
POTASSIUM SERPL-SCNC: 4.5 MMOL/L (ref 3.5–5.1)
PROT SERPL-MCNC: 7.5 G/DL (ref 6.4–8.2)
PSA SERPL-MCNC: 1.2 NG/ML (ref 0.01–4)
RBC # BLD AUTO: 4.68 M/UL (ref 4.1–5.7)
SODIUM SERPL-SCNC: 139 MMOL/L (ref 136–145)
TRIGL SERPL-MCNC: 80 MG/DL (ref ?–150)
VLDLC SERPL CALC-MCNC: 16 MG/DL
WBC # BLD AUTO: 3.6 K/UL (ref 4.1–11.1)

## 2023-04-21 ENCOUNTER — HOSPITAL ENCOUNTER (OUTPATIENT)
Dept: CT IMAGING | Age: 78
Discharge: HOME | End: 2023-04-21
Attending: INTERNAL MEDICINE
Payer: MEDICARE

## 2023-04-21 DIAGNOSIS — C34.12 PRIMARY CANCER OF LEFT UPPER LOBE OF LUNG (HCC): ICD-10-CM

## 2023-04-21 PROCEDURE — 71250 CT THORAX DX C-: CPT

## 2023-04-22 DIAGNOSIS — C34.12 PRIMARY CANCER OF LEFT UPPER LOBE OF LUNG (HCC): Primary | ICD-10-CM

## 2023-08-08 NOTE — PROGRESS NOTES
Reviewed record in preparation for visit and have obtained necessary documentation. Identified pt with two pt identifiers(name and ). Chief Complaint   Patient presents with    Hypertension       Health Maintenance Due   Topic Date Due    Shingles Vaccine (1 of 2) 1994    Glaucoma Screening   2009    AAA Screening  2009    Flu Vaccine  2018    Annual Well Visit  2019       Mr. Anabella Jama has a reminder for a \"due or due soon\" health maintenance. I have asked that he discuss this further with his primary care provider for follow-up on this health maintenance. Coordination of Care Questionnaire:  :     1) Have you been to an emergency room, urgent care clinic since your last visit? no     Hospitalized since your last visit? no             2) Have you seen or consulted any other health care providers outside of [address] [place] [address] since your last visit? no  (Include any pap smears or colon screenings in this section.)    3) In the event something were to happen to you and you were unable to speak on your behalf, do you have an Advance Directive/ Living Will in place stating your wishes? NO    Do you have an Advance Directive on file? no    4) Are you interested in receiving information on Advance Directives? YES    Patient is accompanied by self I have received verbal consent from Jose Hummel to discuss any/all medical information while they are present in the room. Female

## 2023-08-16 ENCOUNTER — TELEPHONE (OUTPATIENT)
Age: 78
End: 2023-08-16

## 2023-08-16 DIAGNOSIS — E78.00 PURE HYPERCHOLESTEROLEMIA, UNSPECIFIED: Primary | ICD-10-CM

## 2023-08-16 NOTE — TELEPHONE ENCOUNTER
atorvastatin (LIPITOR) 10 MG tablet    90 day mail order [phone] [facility] as soon as possible.

## 2023-08-16 NOTE — TELEPHONE ENCOUNTER
Wife states that pt has not been taking the atorvastatin.  Wife states he needs a refill      atorvastatin (LIPITOR) 10 MG tablet    Please call in enough (two weeks)  to [facility] #[phone]

## 2023-08-16 NOTE — TELEPHONE ENCOUNTER
Future Appointments:  Future Appointments   Date Time Provider [address]   8/21/2023  2:30 PM ED [facility] CT 3 MRMRCT \[facility]\""C        Last Appointment With Me:  2/9/2023     Requested Prescriptions     Pending Prescriptions Disp Refills    atorvastatin (LIPITOR) 10 MG tablet 90 tablet 1     Sig: Take 1 tablet by mouth nightly

## 2023-08-16 NOTE — TELEPHONE ENCOUNTER
The patient needs a 90 day and a 30 day sent into his local pharmacy. denies loose denies loose teeth

## 2023-08-19 DIAGNOSIS — E78.00 PURE HYPERCHOLESTEROLEMIA, UNSPECIFIED: ICD-10-CM

## 2023-08-19 RX ORDER — ATORVASTATIN CALCIUM 10 MG/1
10 TABLET, FILM COATED ORAL NIGHTLY
Qty: 90 TABLET | Refills: 1 | Status: SHIPPED | OUTPATIENT
Start: 2023-08-19 | End: 2023-08-19

## 2023-08-19 RX ORDER — ATORVASTATIN CALCIUM 10 MG/1
10 TABLET, FILM COATED ORAL NIGHTLY
Qty: 30 TABLET | Refills: 0 | Status: SHIPPED | OUTPATIENT
Start: 2023-08-19

## 2023-08-21 ENCOUNTER — HOSPITAL ENCOUNTER (OUTPATIENT)
Facility: HOSPITAL | Age: 78
Discharge: HOME | End: 2023-08-24
Attending: INTERNAL MEDICINE
Payer: COMMERCIAL

## 2023-08-21 DIAGNOSIS — C34.12 PRIMARY CANCER OF LEFT UPPER LOBE OF LUNG (HCC): ICD-10-CM

## 2023-08-21 PROCEDURE — 71250 CT THORAX DX C-: CPT

## 2023-08-21 RX ORDER — ATORVASTATIN CALCIUM 10 MG/1
TABLET, FILM COATED ORAL
Qty: 90 TABLET | OUTPATIENT
Start: 2023-08-21

## 2023-09-19 DIAGNOSIS — K21.9 GASTRO-ESOPHAGEAL REFLUX DISEASE WITHOUT ESOPHAGITIS: Primary | ICD-10-CM

## 2023-09-19 NOTE — TELEPHONE ENCOUNTER
omeprazole (PRILOSEC) 40 MG delayed release capsule    Pt is out of medication.     Refill to go to [facility] [place] order 90 day refill

## 2023-09-19 NOTE — TELEPHONE ENCOUNTER
Future Appointments:  Future Appointments   Date Time Provider [address]   9/25/2023 10:40 AM Osbaldo Fish MD [facility] BS AMB        Last Appointment With Me:  2/9/2023     Requested Prescriptions     Pending Prescriptions Disp Refills    omeprazole (PRILOSEC) 40 MG delayed release capsule 90 capsule 1     Sig: Take 1 capsule by mouth daily

## 2023-09-22 RX ORDER — OMEPRAZOLE 20 MG/1
20 CAPSULE, DELAYED RELEASE ORAL
Qty: 90 CAPSULE | Refills: 0 | Status: SHIPPED | OUTPATIENT
Start: 2023-09-22

## 2023-09-22 RX ORDER — OMEPRAZOLE 40 MG/1
40 CAPSULE, DELAYED RELEASE ORAL DAILY
Qty: 90 CAPSULE | Refills: 1 | Status: CANCELLED | OUTPATIENT
Start: 2023-09-22

## 2023-09-25 ENCOUNTER — OFFICE VISIT (OUTPATIENT)
Age: 78
End: 2023-09-25
Payer: MEDICARE

## 2023-09-25 VITALS
HEIGHT: 71 IN | BODY MASS INDEX: 20.02 KG/M2 | DIASTOLIC BLOOD PRESSURE: 87 MMHG | RESPIRATION RATE: 18 BRPM | HEART RATE: 75 BPM | TEMPERATURE: 98 F | SYSTOLIC BLOOD PRESSURE: 146 MMHG | WEIGHT: 143 LBS | OXYGEN SATURATION: 98 %

## 2023-09-25 DIAGNOSIS — I10 ESSENTIAL (PRIMARY) HYPERTENSION: ICD-10-CM

## 2023-09-25 DIAGNOSIS — M54.50 ACUTE LEFT-SIDED LOW BACK PAIN WITHOUT SCIATICA: ICD-10-CM

## 2023-09-25 DIAGNOSIS — R73.09 ELEVATED HEMOGLOBIN A1C: Primary | ICD-10-CM

## 2023-09-25 DIAGNOSIS — M25.552 HIP PAIN, LEFT: ICD-10-CM

## 2023-09-25 PROCEDURE — 99214 OFFICE O/P EST MOD 30 MIN: CPT | Performed by: FAMILY MEDICINE

## 2023-09-25 PROCEDURE — 1123F ACP DISCUSS/DSCN MKR DOCD: CPT | Performed by: FAMILY MEDICINE

## 2023-09-25 PROCEDURE — 1036F TOBACCO NON-USER: CPT | Performed by: FAMILY MEDICINE

## 2023-09-25 PROCEDURE — G8420 CALC BMI NORM PARAMETERS: HCPCS | Performed by: FAMILY MEDICINE

## 2023-09-25 PROCEDURE — 3077F SYST BP >= 140 MM HG: CPT | Performed by: FAMILY MEDICINE

## 2023-09-25 PROCEDURE — 3079F DIAST BP 80-89 MM HG: CPT | Performed by: FAMILY MEDICINE

## 2023-09-25 PROCEDURE — G8427 DOCREV CUR MEDS BY ELIG CLIN: HCPCS | Performed by: FAMILY MEDICINE

## 2023-09-25 SDOH — ECONOMIC STABILITY: INCOME INSECURITY: HOW HARD IS IT FOR YOU TO PAY FOR THE VERY BASICS LIKE FOOD, HOUSING, MEDICAL CARE, AND HEATING?: NOT HARD AT ALL

## 2023-09-25 SDOH — ECONOMIC STABILITY: FOOD INSECURITY: WITHIN THE PAST 12 MONTHS, THE FOOD YOU BOUGHT JUST DIDN'T LAST AND YOU DIDN'T HAVE MONEY TO GET MORE.: NEVER TRUE

## 2023-09-25 SDOH — ECONOMIC STABILITY: HOUSING INSECURITY
IN THE LAST 12 MONTHS, WAS THERE A TIME WHEN YOU DID NOT HAVE A STEADY PLACE TO SLEEP OR SLEPT IN A SHELTER (INCLUDING NOW)?: NO

## 2023-09-25 SDOH — ECONOMIC STABILITY: FOOD INSECURITY: WITHIN THE PAST 12 MONTHS, YOU WORRIED THAT YOUR FOOD WOULD RUN OUT BEFORE YOU GOT MONEY TO BUY MORE.: NEVER TRUE

## 2023-09-25 ASSESSMENT — ANXIETY QUESTIONNAIRES
5. BEING SO RESTLESS THAT IT IS HARD TO SIT STILL: 0
4. TROUBLE RELAXING: 0
7. FEELING AFRAID AS IF SOMETHING AWFUL MIGHT HAPPEN: 0
GAD7 TOTAL SCORE: 0
2. NOT BEING ABLE TO STOP OR CONTROL WORRYING: 0
3. WORRYING TOO MUCH ABOUT DIFFERENT THINGS: 0
1. FEELING NERVOUS, ANXIOUS, OR ON EDGE: 0
6. BECOMING EASILY ANNOYED OR IRRITABLE: 0
IF YOU CHECKED OFF ANY PROBLEMS ON THIS QUESTIONNAIRE, HOW DIFFICULT HAVE THESE PROBLEMS MADE IT FOR YOU TO DO YOUR WORK, TAKE CARE OF THINGS AT HOME, OR GET ALONG WITH OTHER PEOPLE: NOT DIFFICULT AT ALL

## 2023-09-25 ASSESSMENT — PATIENT HEALTH QUESTIONNAIRE - PHQ9
SUM OF ALL RESPONSES TO PHQ QUESTIONS 1-9: 0
2. FEELING DOWN, DEPRESSED OR HOPELESS: 0
SUM OF ALL RESPONSES TO PHQ QUESTIONS 1-9: 0
SUM OF ALL RESPONSES TO PHQ9 QUESTIONS 1 & 2: 0
SUM OF ALL RESPONSES TO PHQ QUESTIONS 1-9: 0
SUM OF ALL RESPONSES TO PHQ QUESTIONS 1-9: 0
1. LITTLE INTEREST OR PLEASURE IN DOING THINGS: 0

## 2023-09-25 NOTE — PROGRESS NOTES
SUBJECTIVE:   Mr. Gisselle Griffin is a 77 y.o. male who is here for follow up of routine medical issues. HTN: Pt is compliant in taking amlodipine 10mg daily. Pt's BP in the office today was 146/87. Patient denies chest pain, CRUZ/SOB, edema, headache, visual changes, dizziness, palpitations or syncope. Elevated A1C: Pt's HgbA1c was elevated when last checked on 2/9/2023. GERD: Pt has not been taking Prilosec for some time because he ran out. He no longer feels the need to take Prilosec daily but he was prior to running out. Hx of lung cancer: Pt was Rx'd an albuterol rescue inhaler. He has not used this yet because he feels he does not need it. He uses his Symbicort maintenance inhaler daily. Pt had a CT scan with Pulmonology. He had a lesion on his initial CT and followed-up in 4 months. He states that his repeat CT was completely normal. Pt will follow-up with Pulmonology in 6 months. Hip pain: Pt occasionally gets pain in his low back around to the side of his L hip. He denies pain in his hip joint when walking. He states he was in pain last night and woke up this morning and it had completely resolved. He is not currently in pain. He is unsure if anything triggered or exacerbated his pain. Pt's wife states that the pt has more pain than he admits to. Memory: Pt's wife is concerned about the pt's memory. Pt denies having memory issues. He states there are times where he goes to the kitchen and then cannot remember what he came to the kitchen for. He eventually remembers and goes back. Pt states that he goes on walks in the mornings to stay active. He states his appetite is good. PREVENTIVE:   Flu: pt will receive  RSV: pt will receive  Pneumonia: UTD     At this time, he is otherwise doing well and has brought no other complaints to my attention today. For a list of the medical issues addressed today, see the assessment and plan below.     PMH:   Past Medical History:   Diagnosis

## 2023-09-26 DIAGNOSIS — E78.00 PURE HYPERCHOLESTEROLEMIA, UNSPECIFIED: ICD-10-CM

## 2023-09-26 LAB
ALBUMIN SERPL-MCNC: 4.4 G/DL (ref 3.5–5)
ALBUMIN/GLOB SERPL: 1.4 (ref 1.1–2.2)
ALP SERPL-CCNC: 11 U/L (ref 45–117)
ALT SERPL-CCNC: 22 U/L (ref 12–78)
ANION GAP SERPL CALC-SCNC: 2 MMOL/L (ref 5–15)
AST SERPL-CCNC: 21 U/L (ref 15–37)
BASOPHILS # BLD: 0 K/UL (ref 0–0.1)
BASOPHILS NFR BLD: 1 % (ref 0–1)
BILIRUB SERPL-MCNC: 0.8 MG/DL (ref 0.2–1)
BUN SERPL-MCNC: 14 MG/DL (ref 6–20)
BUN/CREAT SERPL: 16 (ref 12–20)
CALCIUM SERPL-MCNC: 9.3 MG/DL (ref 8.5–10.1)
CHLORIDE SERPL-SCNC: 104 MMOL/L (ref 97–108)
CO2 SERPL-SCNC: 31 MMOL/L (ref 21–32)
CREAT SERPL-MCNC: 0.87 MG/DL (ref 0.7–1.3)
DIFFERENTIAL METHOD BLD: NORMAL
EOSINOPHIL # BLD: 0.1 K/UL (ref 0–0.4)
EOSINOPHIL NFR BLD: 2 % (ref 0–7)
ERYTHROCYTE [DISTWIDTH] IN BLOOD BY AUTOMATED COUNT: 13 % (ref 11.5–14.5)
EST. AVERAGE GLUCOSE BLD GHB EST-MCNC: 126 MG/DL
GLOBULIN SER CALC-MCNC: 3.1 G/DL (ref 2–4)
GLUCOSE SERPL-MCNC: 102 MG/DL (ref 65–100)
HBA1C MFR BLD: 6 % (ref 4–5.6)
HCT VFR BLD AUTO: 45.5 % (ref 36.6–50.3)
HGB BLD-MCNC: 14.8 G/DL (ref 12.1–17)
IMM GRANULOCYTES # BLD AUTO: 0 K/UL (ref 0–0.04)
IMM GRANULOCYTES NFR BLD AUTO: 0 % (ref 0–0.5)
LYMPHOCYTES # BLD: 1 K/UL (ref 0.8–3.5)
LYMPHOCYTES NFR BLD: 24 % (ref 12–49)
MCH RBC QN AUTO: 31.8 PG (ref 26–34)
MCHC RBC AUTO-ENTMCNC: 32.5 G/DL (ref 30–36.5)
MCV RBC AUTO: 97.8 FL (ref 80–99)
MONOCYTES # BLD: 0.5 K/UL (ref 0–1)
MONOCYTES NFR BLD: 12 % (ref 5–13)
NEUTS SEG # BLD: 2.5 K/UL (ref 1.8–8)
NEUTS SEG NFR BLD: 61 % (ref 32–75)
NRBC # BLD: 0 K/UL (ref 0–0.01)
NRBC BLD-RTO: 0 PER 100 WBC
PLATELET # BLD AUTO: 295 K/UL (ref 150–400)
PMV BLD AUTO: 9.9 FL (ref 8.9–12.9)
POTASSIUM SERPL-SCNC: 4.3 MMOL/L (ref 3.5–5.1)
PROT SERPL-MCNC: 7.5 G/DL (ref 6.4–8.2)
RBC # BLD AUTO: 4.65 M/UL (ref 4.1–5.7)
SODIUM SERPL-SCNC: 137 MMOL/L (ref 136–145)
WBC # BLD AUTO: 4.1 K/UL (ref 4.1–11.1)

## 2023-09-26 RX ORDER — ATORVASTATIN CALCIUM 10 MG/1
10 TABLET, FILM COATED ORAL
Qty: 90 TABLET | Refills: 3 | Status: SHIPPED | OUTPATIENT
Start: 2023-09-26

## 2024-03-23 DIAGNOSIS — K21.9 GASTRO-ESOPHAGEAL REFLUX DISEASE WITHOUT ESOPHAGITIS: ICD-10-CM

## 2024-03-23 RX ORDER — OMEPRAZOLE 20 MG/1
20 CAPSULE, DELAYED RELEASE ORAL
Qty: 90 CAPSULE | Refills: 0 | Status: SHIPPED | OUTPATIENT
Start: 2024-03-23

## 2024-04-19 RX ORDER — AMLODIPINE BESYLATE 10 MG/1
10 TABLET ORAL DAILY
Qty: 90 TABLET | Refills: 3 | Status: SHIPPED | OUTPATIENT
Start: 2024-04-19

## 2024-07-30 DIAGNOSIS — E78.00 PURE HYPERCHOLESTEROLEMIA, UNSPECIFIED: ICD-10-CM

## 2024-07-31 RX ORDER — ATORVASTATIN CALCIUM 10 MG/1
10 TABLET, FILM COATED ORAL NIGHTLY
Qty: 90 TABLET | Refills: 3 | Status: SHIPPED | OUTPATIENT
Start: 2024-07-31

## 2024-08-26 ENCOUNTER — HOSPITAL ENCOUNTER (OUTPATIENT)
Facility: HOSPITAL | Age: 79
Discharge: HOME | End: 2024-08-29
Attending: INTERNAL MEDICINE
Payer: MEDICARE

## 2024-08-26 DIAGNOSIS — C34.12 PRIMARY MALIGNANT NEOPLASM OF BRONCHUS OF LEFT UPPER LOBE (HCC): ICD-10-CM

## 2024-08-26 PROCEDURE — 71250 CT THORAX DX C-: CPT

## 2024-09-21 ENCOUNTER — APPOINTMENT (OUTPATIENT)
Facility: HOSPITAL | Age: 79
DRG: 482 | End: 2024-09-21
Payer: MEDICARE

## 2024-09-21 ENCOUNTER — HOSPITAL ENCOUNTER (INPATIENT)
Facility: HOSPITAL | Age: 79
LOS: 6 days | Discharge: NURSING, REHAB OR LONG-TERM CARE | DRG: 482 | End: 2024-09-27
Attending: STUDENT IN AN ORGANIZED HEALTH CARE EDUCATION/TRAINING PROGRAM | Admitting: INTERNAL MEDICINE
Payer: MEDICARE

## 2024-09-21 DIAGNOSIS — S72.001A CLOSED RIGHT HIP FRACTURE, INITIAL ENCOUNTER (HCC): Primary | ICD-10-CM

## 2024-09-21 LAB
ALBUMIN SERPL-MCNC: 4 G/DL (ref 3.5–5)
ALBUMIN/GLOB SERPL: 1.2 (ref 1.1–2.2)
ALP SERPL-CCNC: <10 U/L (ref 45–117)
ALT SERPL-CCNC: 26 U/L (ref 12–78)
ANION GAP SERPL CALC-SCNC: 5 MMOL/L (ref 2–12)
APTT PPP: 24.3 SEC (ref 22.1–31)
AST SERPL-CCNC: 31 U/L (ref 15–37)
BASOPHILS # BLD: 0 K/UL (ref 0–0.1)
BASOPHILS NFR BLD: 0 % (ref 0–1)
BILIRUB SERPL-MCNC: 0.7 MG/DL (ref 0.2–1)
BUN SERPL-MCNC: 17 MG/DL (ref 6–20)
BUN/CREAT SERPL: 18 (ref 12–20)
CALCIUM SERPL-MCNC: 9.2 MG/DL (ref 8.5–10.1)
CHLORIDE SERPL-SCNC: 104 MMOL/L (ref 97–108)
CO2 SERPL-SCNC: 27 MMOL/L (ref 21–32)
CREAT SERPL-MCNC: 0.95 MG/DL (ref 0.7–1.3)
DIFFERENTIAL METHOD BLD: ABNORMAL
EOSINOPHIL # BLD: 0 K/UL (ref 0–0.4)
EOSINOPHIL NFR BLD: 0 % (ref 0–7)
ERYTHROCYTE [DISTWIDTH] IN BLOOD BY AUTOMATED COUNT: 13.1 % (ref 11.5–14.5)
GLOBULIN SER CALC-MCNC: 3.4 G/DL (ref 2–4)
GLUCOSE SERPL-MCNC: 130 MG/DL (ref 65–100)
HCT VFR BLD AUTO: 41.2 % (ref 36.6–50.3)
HGB BLD-MCNC: 14.1 G/DL (ref 12.1–17)
IMM GRANULOCYTES # BLD AUTO: 0 K/UL (ref 0–0.04)
IMM GRANULOCYTES NFR BLD AUTO: 0 % (ref 0–0.5)
INR PPP: 1.1 (ref 0.9–1.1)
LYMPHOCYTES # BLD: 0.9 K/UL (ref 0.8–3.5)
LYMPHOCYTES NFR BLD: 10 % (ref 12–49)
MAGNESIUM SERPL-MCNC: 1.7 MG/DL (ref 1.6–2.4)
MCH RBC QN AUTO: 32.3 PG (ref 26–34)
MCHC RBC AUTO-ENTMCNC: 34.2 G/DL (ref 30–36.5)
MCV RBC AUTO: 94.5 FL (ref 80–99)
MONOCYTES # BLD: 0.5 K/UL (ref 0–1)
MONOCYTES NFR BLD: 6 % (ref 5–13)
NEUTS SEG # BLD: 7.9 K/UL (ref 1.8–8)
NEUTS SEG NFR BLD: 84 % (ref 32–75)
NRBC # BLD: 0 K/UL (ref 0–0.01)
NRBC BLD-RTO: 0 PER 100 WBC
PHOSPHATE SERPL-MCNC: 2.6 MG/DL (ref 2.6–4.7)
PLATELET # BLD AUTO: 265 K/UL (ref 150–400)
PMV BLD AUTO: 10.1 FL (ref 8.9–12.9)
POTASSIUM SERPL-SCNC: 3.9 MMOL/L (ref 3.5–5.1)
PROT SERPL-MCNC: 7.4 G/DL (ref 6.4–8.2)
PROTHROMBIN TIME: 11.2 SEC (ref 9–11.1)
RBC # BLD AUTO: 4.36 M/UL (ref 4.1–5.7)
SODIUM SERPL-SCNC: 136 MMOL/L (ref 136–145)
THERAPEUTIC RANGE: NORMAL SECS (ref 58–77)
WBC # BLD AUTO: 9.4 K/UL (ref 4.1–11.1)

## 2024-09-21 PROCEDURE — 6370000000 HC RX 637 (ALT 250 FOR IP): Performed by: INTERNAL MEDICINE

## 2024-09-21 PROCEDURE — 94664 DEMO&/EVAL PT USE INHALER: CPT

## 2024-09-21 PROCEDURE — 1100000003 HC PRIVATE W/ TELEMETRY

## 2024-09-21 PROCEDURE — 6360000002 HC RX W HCPCS: Performed by: STUDENT IN AN ORGANIZED HEALTH CARE EDUCATION/TRAINING PROGRAM

## 2024-09-21 PROCEDURE — 99285 EMERGENCY DEPT VISIT HI MDM: CPT

## 2024-09-21 PROCEDURE — APPNB15 APP NON BILLABLE TIME 0-15 MINS: Performed by: PHYSICIAN ASSISTANT

## 2024-09-21 PROCEDURE — 85730 THROMBOPLASTIN TIME PARTIAL: CPT

## 2024-09-21 PROCEDURE — 85610 PROTHROMBIN TIME: CPT

## 2024-09-21 PROCEDURE — 85025 COMPLETE CBC W/AUTO DIFF WBC: CPT

## 2024-09-21 PROCEDURE — 73502 X-RAY EXAM HIP UNI 2-3 VIEWS: CPT

## 2024-09-21 PROCEDURE — 83735 ASSAY OF MAGNESIUM: CPT

## 2024-09-21 PROCEDURE — 70450 CT HEAD/BRAIN W/O DYE: CPT

## 2024-09-21 PROCEDURE — 96374 THER/PROPH/DIAG INJ IV PUSH: CPT

## 2024-09-21 PROCEDURE — 2580000003 HC RX 258: Performed by: INTERNAL MEDICINE

## 2024-09-21 PROCEDURE — 72125 CT NECK SPINE W/O DYE: CPT

## 2024-09-21 PROCEDURE — 84100 ASSAY OF PHOSPHORUS: CPT

## 2024-09-21 PROCEDURE — 6360000002 HC RX W HCPCS: Performed by: INTERNAL MEDICINE

## 2024-09-21 PROCEDURE — 80053 COMPREHEN METABOLIC PANEL: CPT

## 2024-09-21 PROCEDURE — 36415 COLL VENOUS BLD VENIPUNCTURE: CPT

## 2024-09-21 PROCEDURE — 94640 AIRWAY INHALATION TREATMENT: CPT

## 2024-09-21 RX ORDER — MORPHINE SULFATE 2 MG/ML
2 INJECTION, SOLUTION INTRAMUSCULAR; INTRAVENOUS EVERY 4 HOURS PRN
Status: DISCONTINUED | OUTPATIENT
Start: 2024-09-21 | End: 2024-09-21

## 2024-09-21 RX ORDER — SODIUM CHLORIDE 0.9 % (FLUSH) 0.9 %
5-40 SYRINGE (ML) INJECTION EVERY 12 HOURS SCHEDULED
Status: DISCONTINUED | OUTPATIENT
Start: 2024-09-21 | End: 2024-09-27 | Stop reason: HOSPADM

## 2024-09-21 RX ORDER — ONDANSETRON 4 MG/1
4 TABLET, ORALLY DISINTEGRATING ORAL EVERY 8 HOURS PRN
Status: DISCONTINUED | OUTPATIENT
Start: 2024-09-21 | End: 2024-09-27 | Stop reason: HOSPADM

## 2024-09-21 RX ORDER — ACETAMINOPHEN 325 MG/1
650 TABLET ORAL EVERY 6 HOURS PRN
Status: DISCONTINUED | OUTPATIENT
Start: 2024-09-21 | End: 2024-09-27 | Stop reason: HOSPADM

## 2024-09-21 RX ORDER — MAGNESIUM HYDROXIDE/ALUMINUM HYDROXICE/SIMETHICONE 120; 1200; 1200 MG/30ML; MG/30ML; MG/30ML
30 SUSPENSION ORAL EVERY 6 HOURS PRN
Status: DISCONTINUED | OUTPATIENT
Start: 2024-09-21 | End: 2024-09-27 | Stop reason: HOSPADM

## 2024-09-21 RX ORDER — LANOLIN ALCOHOL/MO/W.PET/CERES
3 CREAM (GRAM) TOPICAL NIGHTLY
Status: DISCONTINUED | OUTPATIENT
Start: 2024-09-21 | End: 2024-09-27 | Stop reason: HOSPADM

## 2024-09-21 RX ORDER — AMLODIPINE BESYLATE 5 MG/1
10 TABLET ORAL DAILY
Status: DISCONTINUED | OUTPATIENT
Start: 2024-09-21 | End: 2024-09-27 | Stop reason: HOSPADM

## 2024-09-21 RX ORDER — SODIUM CHLORIDE 9 MG/ML
INJECTION, SOLUTION INTRAVENOUS CONTINUOUS
Status: ACTIVE | OUTPATIENT
Start: 2024-09-21 | End: 2024-09-23

## 2024-09-21 RX ORDER — ACETAMINOPHEN 650 MG/1
650 SUPPOSITORY RECTAL EVERY 6 HOURS PRN
Status: DISCONTINUED | OUTPATIENT
Start: 2024-09-21 | End: 2024-09-27 | Stop reason: HOSPADM

## 2024-09-21 RX ORDER — MORPHINE SULFATE 2 MG/ML
1 INJECTION, SOLUTION INTRAMUSCULAR; INTRAVENOUS EVERY 4 HOURS PRN
Status: DISCONTINUED | OUTPATIENT
Start: 2024-09-21 | End: 2024-09-27 | Stop reason: HOSPADM

## 2024-09-21 RX ORDER — LIDOCAINE 4 G/G
1 PATCH TOPICAL DAILY
Status: DISCONTINUED | OUTPATIENT
Start: 2024-09-21 | End: 2024-09-27 | Stop reason: HOSPADM

## 2024-09-21 RX ORDER — FENTANYL CITRATE 50 UG/ML
50 INJECTION, SOLUTION INTRAMUSCULAR; INTRAVENOUS
Status: COMPLETED | OUTPATIENT
Start: 2024-09-21 | End: 2024-09-21

## 2024-09-21 RX ORDER — ENOXAPARIN SODIUM 100 MG/ML
40 INJECTION SUBCUTANEOUS DAILY
Status: DISCONTINUED | OUTPATIENT
Start: 2024-09-21 | End: 2024-09-22

## 2024-09-21 RX ORDER — SODIUM CHLORIDE 0.9 % (FLUSH) 0.9 %
5-40 SYRINGE (ML) INJECTION PRN
Status: DISCONTINUED | OUTPATIENT
Start: 2024-09-21 | End: 2024-09-27 | Stop reason: HOSPADM

## 2024-09-21 RX ORDER — MORPHINE SULFATE 2 MG/ML
2 INJECTION, SOLUTION INTRAMUSCULAR; INTRAVENOUS EVERY 4 HOURS PRN
Status: DISCONTINUED | OUTPATIENT
Start: 2024-09-21 | End: 2024-09-27 | Stop reason: HOSPADM

## 2024-09-21 RX ORDER — POLYETHYLENE GLYCOL 3350 17 G/17G
17 POWDER, FOR SOLUTION ORAL DAILY PRN
Status: DISCONTINUED | OUTPATIENT
Start: 2024-09-21 | End: 2024-09-27 | Stop reason: HOSPADM

## 2024-09-21 RX ORDER — TIZANIDINE 2 MG/1
2 TABLET ORAL DAILY
Status: DISCONTINUED | OUTPATIENT
Start: 2024-09-21 | End: 2024-09-27 | Stop reason: HOSPADM

## 2024-09-21 RX ORDER — PANTOPRAZOLE SODIUM 40 MG/1
40 TABLET, DELAYED RELEASE ORAL
Status: DISCONTINUED | OUTPATIENT
Start: 2024-09-22 | End: 2024-09-27 | Stop reason: HOSPADM

## 2024-09-21 RX ORDER — SODIUM CHLORIDE 9 MG/ML
INJECTION, SOLUTION INTRAVENOUS PRN
Status: DISCONTINUED | OUTPATIENT
Start: 2024-09-21 | End: 2024-09-27 | Stop reason: HOSPADM

## 2024-09-21 RX ORDER — ONDANSETRON 2 MG/ML
4 INJECTION INTRAMUSCULAR; INTRAVENOUS EVERY 6 HOURS PRN
Status: DISCONTINUED | OUTPATIENT
Start: 2024-09-21 | End: 2024-09-27 | Stop reason: HOSPADM

## 2024-09-21 RX ORDER — ATORVASTATIN CALCIUM 10 MG/1
10 TABLET, FILM COATED ORAL NIGHTLY
Status: DISCONTINUED | OUTPATIENT
Start: 2024-09-21 | End: 2024-09-27 | Stop reason: HOSPADM

## 2024-09-21 RX ADMIN — MELATONIN 3 MG: at 20:46

## 2024-09-21 RX ADMIN — SODIUM CHLORIDE, PRESERVATIVE FREE 10 ML: 5 INJECTION INTRAVENOUS at 21:00

## 2024-09-21 RX ADMIN — MORPHINE SULFATE 2 MG: 2 INJECTION, SOLUTION INTRAMUSCULAR; INTRAVENOUS at 20:45

## 2024-09-21 RX ADMIN — ACETAMINOPHEN 650 MG: 325 TABLET ORAL at 18:12

## 2024-09-21 RX ADMIN — MORPHINE SULFATE 2 MG: 2 INJECTION, SOLUTION INTRAMUSCULAR; INTRAVENOUS at 17:09

## 2024-09-21 RX ADMIN — FENTANYL CITRATE 50 MCG: 50 INJECTION INTRAMUSCULAR; INTRAVENOUS at 16:01

## 2024-09-21 RX ADMIN — TIZANIDINE 2 MG: 2 TABLET ORAL at 18:11

## 2024-09-21 RX ADMIN — ATORVASTATIN CALCIUM 10 MG: 10 TABLET, FILM COATED ORAL at 20:46

## 2024-09-21 RX ADMIN — ARFORMOTEROL TARTRATE: 15 SOLUTION RESPIRATORY (INHALATION) at 19:46

## 2024-09-21 RX ADMIN — SODIUM CHLORIDE, PRESERVATIVE FREE 10 ML: 5 INJECTION INTRAVENOUS at 20:46

## 2024-09-21 RX ADMIN — AMLODIPINE BESYLATE 10 MG: 5 TABLET ORAL at 18:12

## 2024-09-21 ASSESSMENT — PAIN DESCRIPTION - LOCATION
LOCATION: HIP

## 2024-09-21 ASSESSMENT — PAIN SCALES - GENERAL
PAINLEVEL_OUTOF10: 0
PAINLEVEL_OUTOF10: 10
PAINLEVEL_OUTOF10: 6
PAINLEVEL_OUTOF10: 9

## 2024-09-21 ASSESSMENT — PAIN - FUNCTIONAL ASSESSMENT
PAIN_FUNCTIONAL_ASSESSMENT: PREVENTS OR INTERFERES WITH ALL ACTIVE AND SOME PASSIVE ACTIVITIES
PAIN_FUNCTIONAL_ASSESSMENT: PREVENTS OR INTERFERES WITH ALL ACTIVE AND SOME PASSIVE ACTIVITIES

## 2024-09-21 ASSESSMENT — PAIN DESCRIPTION - ORIENTATION
ORIENTATION: RIGHT

## 2024-09-21 ASSESSMENT — PAIN DESCRIPTION - DESCRIPTORS
DESCRIPTORS: ACHING
DESCRIPTORS: ACHING
DESCRIPTORS: DULL
DESCRIPTORS: ACHING;SHARP

## 2024-09-21 ASSESSMENT — PAIN DESCRIPTION - PAIN TYPE: TYPE: ACUTE PAIN

## 2024-09-21 ASSESSMENT — PAIN SCALES - WONG BAKER: WONGBAKER_NUMERICALRESPONSE: NO HURT

## 2024-09-22 ENCOUNTER — ANESTHESIA EVENT (OUTPATIENT)
Facility: HOSPITAL | Age: 79
End: 2024-09-22
Payer: MEDICARE

## 2024-09-22 ENCOUNTER — APPOINTMENT (OUTPATIENT)
Facility: HOSPITAL | Age: 79
DRG: 482 | End: 2024-09-22
Payer: MEDICARE

## 2024-09-22 ENCOUNTER — ANESTHESIA (OUTPATIENT)
Facility: HOSPITAL | Age: 79
End: 2024-09-22
Payer: MEDICARE

## 2024-09-22 LAB
ALBUMIN SERPL-MCNC: 3.5 G/DL (ref 3.5–5)
ALBUMIN/GLOB SERPL: 1.1 (ref 1.1–2.2)
ALP SERPL-CCNC: <10 U/L (ref 45–117)
ALT SERPL-CCNC: 23 U/L (ref 12–78)
ANION GAP SERPL CALC-SCNC: 7 MMOL/L (ref 2–12)
AST SERPL-CCNC: 17 U/L (ref 15–37)
BILIRUB SERPL-MCNC: 1 MG/DL (ref 0.2–1)
BUN SERPL-MCNC: 15 MG/DL (ref 6–20)
BUN/CREAT SERPL: 19 (ref 12–20)
CALCIUM SERPL-MCNC: 8.8 MG/DL (ref 8.5–10.1)
CHLORIDE SERPL-SCNC: 102 MMOL/L (ref 97–108)
CO2 SERPL-SCNC: 27 MMOL/L (ref 21–32)
CREAT SERPL-MCNC: 0.77 MG/DL (ref 0.7–1.3)
ERYTHROCYTE [DISTWIDTH] IN BLOOD BY AUTOMATED COUNT: 13.1 % (ref 11.5–14.5)
GLOBULIN SER CALC-MCNC: 3.3 G/DL (ref 2–4)
GLUCOSE SERPL-MCNC: 160 MG/DL (ref 65–100)
HCT VFR BLD AUTO: 38.4 % (ref 36.6–50.3)
HGB BLD-MCNC: 12.9 G/DL (ref 12.1–17)
MAGNESIUM SERPL-MCNC: 1.8 MG/DL (ref 1.6–2.4)
MCH RBC QN AUTO: 31.8 PG (ref 26–34)
MCHC RBC AUTO-ENTMCNC: 33.6 G/DL (ref 30–36.5)
MCV RBC AUTO: 94.6 FL (ref 80–99)
NRBC # BLD: 0 K/UL (ref 0–0.01)
NRBC BLD-RTO: 0 PER 100 WBC
PHOSPHATE SERPL-MCNC: 3.2 MG/DL (ref 2.6–4.7)
PLATELET # BLD AUTO: 218 K/UL (ref 150–400)
PMV BLD AUTO: 9.4 FL (ref 8.9–12.9)
POTASSIUM SERPL-SCNC: 3.7 MMOL/L (ref 3.5–5.1)
PROT SERPL-MCNC: 6.8 G/DL (ref 6.4–8.2)
RBC # BLD AUTO: 4.06 M/UL (ref 4.1–5.7)
SODIUM SERPL-SCNC: 136 MMOL/L (ref 136–145)
WBC # BLD AUTO: 7.6 K/UL (ref 4.1–11.1)

## 2024-09-22 PROCEDURE — 6370000000 HC RX 637 (ALT 250 FOR IP): Performed by: STUDENT IN AN ORGANIZED HEALTH CARE EDUCATION/TRAINING PROGRAM

## 2024-09-22 PROCEDURE — 2580000003 HC RX 258: Performed by: INTERNAL MEDICINE

## 2024-09-22 PROCEDURE — 6360000002 HC RX W HCPCS: Performed by: INTERNAL MEDICINE

## 2024-09-22 PROCEDURE — 2580000003 HC RX 258: Performed by: ORTHOPAEDIC SURGERY

## 2024-09-22 PROCEDURE — 3700000001 HC ADD 15 MINUTES (ANESTHESIA): Performed by: ORTHOPAEDIC SURGERY

## 2024-09-22 PROCEDURE — 6370000000 HC RX 637 (ALT 250 FOR IP): Performed by: INTERNAL MEDICINE

## 2024-09-22 PROCEDURE — C1713 ANCHOR/SCREW BN/BN,TIS/BN: HCPCS | Performed by: ORTHOPAEDIC SURGERY

## 2024-09-22 PROCEDURE — 6360000002 HC RX W HCPCS: Performed by: NURSE ANESTHETIST, CERTIFIED REGISTERED

## 2024-09-22 PROCEDURE — 51701 INSERT BLADDER CATHETER: CPT

## 2024-09-22 PROCEDURE — 2500000003 HC RX 250 WO HCPCS: Performed by: NURSE ANESTHETIST, CERTIFIED REGISTERED

## 2024-09-22 PROCEDURE — 83735 ASSAY OF MAGNESIUM: CPT

## 2024-09-22 PROCEDURE — 6360000002 HC RX W HCPCS: Performed by: ORTHOPAEDIC SURGERY

## 2024-09-22 PROCEDURE — 94640 AIRWAY INHALATION TREATMENT: CPT

## 2024-09-22 PROCEDURE — 0QS606Z REPOSITION RIGHT UPPER FEMUR WITH INTRAMEDULLARY INTERNAL FIXATION DEVICE, OPEN APPROACH: ICD-10-PCS | Performed by: ORTHOPAEDIC SURGERY

## 2024-09-22 PROCEDURE — 3600000004 HC SURGERY LEVEL 4 BASE: Performed by: ORTHOPAEDIC SURGERY

## 2024-09-22 PROCEDURE — 3600000014 HC SURGERY LEVEL 4 ADDTL 15MIN: Performed by: ORTHOPAEDIC SURGERY

## 2024-09-22 PROCEDURE — 6370000000 HC RX 637 (ALT 250 FOR IP): Performed by: ORTHOPAEDIC SURGERY

## 2024-09-22 PROCEDURE — 2580000003 HC RX 258: Performed by: NURSE ANESTHETIST, CERTIFIED REGISTERED

## 2024-09-22 PROCEDURE — 85027 COMPLETE CBC AUTOMATED: CPT

## 2024-09-22 PROCEDURE — APPNB30 APP NON BILLABLE TIME 0-30 MINS: Performed by: PHYSICIAN ASSISTANT

## 2024-09-22 PROCEDURE — 3700000000 HC ANESTHESIA ATTENDED CARE: Performed by: ORTHOPAEDIC SURGERY

## 2024-09-22 PROCEDURE — 7100000000 HC PACU RECOVERY - FIRST 15 MIN: Performed by: ORTHOPAEDIC SURGERY

## 2024-09-22 PROCEDURE — 1100000003 HC PRIVATE W/ TELEMETRY

## 2024-09-22 PROCEDURE — 2720000010 HC SURG SUPPLY STERILE: Performed by: ORTHOPAEDIC SURGERY

## 2024-09-22 PROCEDURE — 7100000001 HC PACU RECOVERY - ADDTL 15 MIN: Performed by: ORTHOPAEDIC SURGERY

## 2024-09-22 PROCEDURE — 80053 COMPREHEN METABOLIC PANEL: CPT

## 2024-09-22 PROCEDURE — 51798 US URINE CAPACITY MEASURE: CPT

## 2024-09-22 PROCEDURE — 2709999900 HC NON-CHARGEABLE SUPPLY: Performed by: ORTHOPAEDIC SURGERY

## 2024-09-22 PROCEDURE — 84100 ASSAY OF PHOSPHORUS: CPT

## 2024-09-22 PROCEDURE — 94761 N-INVAS EAR/PLS OXIMETRY MLT: CPT

## 2024-09-22 PROCEDURE — 36415 COLL VENOUS BLD VENIPUNCTURE: CPT

## 2024-09-22 PROCEDURE — 71045 X-RAY EXAM CHEST 1 VIEW: CPT

## 2024-09-22 PROCEDURE — 2700000000 HC OXYGEN THERAPY PER DAY

## 2024-09-22 DEVICE — LOCKING SCREW
Type: IMPLANTABLE DEVICE | Site: HIP | Status: FUNCTIONAL
Brand: T2 ALPHA

## 2024-09-22 DEVICE — TROCHANTERIC NAIL
Type: IMPLANTABLE DEVICE | Site: HIP | Status: FUNCTIONAL
Brand: GAMMA

## 2024-09-22 DEVICE — LAG SCREW
Type: IMPLANTABLE DEVICE | Site: HIP | Status: FUNCTIONAL
Brand: GAMMA

## 2024-09-22 RX ORDER — SODIUM CHLORIDE, SODIUM LACTATE, POTASSIUM CHLORIDE, CALCIUM CHLORIDE 600; 310; 30; 20 MG/100ML; MG/100ML; MG/100ML; MG/100ML
INJECTION, SOLUTION INTRAVENOUS
Status: DISCONTINUED | OUTPATIENT
Start: 2024-09-22 | End: 2024-09-22 | Stop reason: SDUPTHER

## 2024-09-22 RX ORDER — HYDROMORPHONE HYDROCHLORIDE 2 MG/ML
INJECTION, SOLUTION INTRAMUSCULAR; INTRAVENOUS; SUBCUTANEOUS
Status: DISCONTINUED | OUTPATIENT
Start: 2024-09-22 | End: 2024-09-22 | Stop reason: SDUPTHER

## 2024-09-22 RX ORDER — ROPIVACAINE HYDROCHLORIDE 5 MG/ML
INJECTION, SOLUTION EPIDURAL; INFILTRATION; PERINEURAL PRN
Status: DISCONTINUED | OUTPATIENT
Start: 2024-09-22 | End: 2024-09-22 | Stop reason: ALTCHOICE

## 2024-09-22 RX ORDER — MORPHINE SULFATE 2 MG/ML
2 INJECTION, SOLUTION INTRAMUSCULAR; INTRAVENOUS ONCE
Status: COMPLETED | OUTPATIENT
Start: 2024-09-22 | End: 2024-09-22

## 2024-09-22 RX ORDER — SUCCINYLCHOLINE CHLORIDE 20 MG/ML
INJECTION INTRAMUSCULAR; INTRAVENOUS
Status: DISCONTINUED | OUTPATIENT
Start: 2024-09-22 | End: 2024-09-22 | Stop reason: SDUPTHER

## 2024-09-22 RX ORDER — LIDOCAINE HYDROCHLORIDE 20 MG/ML
INJECTION, SOLUTION EPIDURAL; INFILTRATION; INTRACAUDAL; PERINEURAL
Status: DISCONTINUED | OUTPATIENT
Start: 2024-09-22 | End: 2024-09-22 | Stop reason: SDUPTHER

## 2024-09-22 RX ORDER — ONDANSETRON 2 MG/ML
INJECTION INTRAMUSCULAR; INTRAVENOUS
Status: DISCONTINUED | OUTPATIENT
Start: 2024-09-22 | End: 2024-09-22 | Stop reason: SDUPTHER

## 2024-09-22 RX ORDER — POTASSIUM CHLORIDE 1500 MG/1
40 TABLET, EXTENDED RELEASE ORAL ONCE
Status: COMPLETED | OUTPATIENT
Start: 2024-09-22 | End: 2024-09-22

## 2024-09-22 RX ORDER — HYDROMORPHONE HYDROCHLORIDE 1 MG/ML
0.5 INJECTION, SOLUTION INTRAMUSCULAR; INTRAVENOUS; SUBCUTANEOUS EVERY 5 MIN PRN
Status: DISCONTINUED | OUTPATIENT
Start: 2024-09-22 | End: 2024-09-22 | Stop reason: HOSPADM

## 2024-09-22 RX ORDER — PHENYLEPHRINE HCL IN 0.9% NACL 0.4MG/10ML
SYRINGE (ML) INTRAVENOUS
Status: DISCONTINUED | OUTPATIENT
Start: 2024-09-22 | End: 2024-09-22 | Stop reason: SDUPTHER

## 2024-09-22 RX ORDER — SODIUM CHLORIDE 0.9 % (FLUSH) 0.9 %
5-40 SYRINGE (ML) INJECTION EVERY 12 HOURS SCHEDULED
Status: DISCONTINUED | OUTPATIENT
Start: 2024-09-22 | End: 2024-09-22 | Stop reason: HOSPADM

## 2024-09-22 RX ORDER — FENTANYL CITRATE 50 UG/ML
25 INJECTION, SOLUTION INTRAMUSCULAR; INTRAVENOUS EVERY 5 MIN PRN
Status: DISCONTINUED | OUTPATIENT
Start: 2024-09-22 | End: 2024-09-22 | Stop reason: HOSPADM

## 2024-09-22 RX ORDER — DEXAMETHASONE SODIUM PHOSPHATE 4 MG/ML
INJECTION, SOLUTION INTRA-ARTICULAR; INTRALESIONAL; INTRAMUSCULAR; INTRAVENOUS; SOFT TISSUE
Status: DISCONTINUED | OUTPATIENT
Start: 2024-09-22 | End: 2024-09-22 | Stop reason: SDUPTHER

## 2024-09-22 RX ORDER — OXYCODONE HYDROCHLORIDE 5 MG/1
5 TABLET ORAL
Status: DISCONTINUED | OUTPATIENT
Start: 2024-09-22 | End: 2024-09-22 | Stop reason: HOSPADM

## 2024-09-22 RX ORDER — MAGNESIUM SULFATE 1 G/100ML
1000 INJECTION INTRAVENOUS ONCE
Status: COMPLETED | OUTPATIENT
Start: 2024-09-22 | End: 2024-09-22

## 2024-09-22 RX ORDER — SENNA AND DOCUSATE SODIUM 50; 8.6 MG/1; MG/1
2 TABLET, FILM COATED ORAL DAILY PRN
Status: DISCONTINUED | OUTPATIENT
Start: 2024-09-22 | End: 2024-09-27 | Stop reason: HOSPADM

## 2024-09-22 RX ORDER — SODIUM CHLORIDE 0.9 % (FLUSH) 0.9 %
5-40 SYRINGE (ML) INJECTION PRN
Status: DISCONTINUED | OUTPATIENT
Start: 2024-09-22 | End: 2024-09-22 | Stop reason: HOSPADM

## 2024-09-22 RX ORDER — ENOXAPARIN SODIUM 100 MG/ML
40 INJECTION SUBCUTANEOUS DAILY
Status: DISCONTINUED | OUTPATIENT
Start: 2024-09-23 | End: 2024-09-27 | Stop reason: HOSPADM

## 2024-09-22 RX ORDER — ONDANSETRON 2 MG/ML
4 INJECTION INTRAMUSCULAR; INTRAVENOUS
Status: DISCONTINUED | OUTPATIENT
Start: 2024-09-22 | End: 2024-09-22 | Stop reason: HOSPADM

## 2024-09-22 RX ORDER — ROCURONIUM BROMIDE 10 MG/ML
INJECTION, SOLUTION INTRAVENOUS
Status: DISCONTINUED | OUTPATIENT
Start: 2024-09-22 | End: 2024-09-22 | Stop reason: SDUPTHER

## 2024-09-22 RX ORDER — TRANEXAMIC ACID 100 MG/ML
INJECTION, SOLUTION INTRAVENOUS
Status: DISCONTINUED | OUTPATIENT
Start: 2024-09-22 | End: 2024-09-22 | Stop reason: SDUPTHER

## 2024-09-22 RX ORDER — FENTANYL CITRATE 50 UG/ML
INJECTION, SOLUTION INTRAMUSCULAR; INTRAVENOUS
Status: DISCONTINUED | OUTPATIENT
Start: 2024-09-22 | End: 2024-09-22 | Stop reason: SDUPTHER

## 2024-09-22 RX ORDER — IPRATROPIUM BROMIDE AND ALBUTEROL SULFATE 2.5; .5 MG/3ML; MG/3ML
1 SOLUTION RESPIRATORY (INHALATION) ONCE
Status: COMPLETED | OUTPATIENT
Start: 2024-09-22 | End: 2024-09-22

## 2024-09-22 RX ORDER — NALOXONE HYDROCHLORIDE 0.4 MG/ML
INJECTION, SOLUTION INTRAMUSCULAR; INTRAVENOUS; SUBCUTANEOUS PRN
Status: DISCONTINUED | OUTPATIENT
Start: 2024-09-22 | End: 2024-09-22 | Stop reason: HOSPADM

## 2024-09-22 RX ADMIN — ROCURONIUM BROMIDE 20 MG: 10 INJECTION INTRAVENOUS at 15:13

## 2024-09-22 RX ADMIN — POTASSIUM CHLORIDE 40 MEQ: 1500 TABLET, EXTENDED RELEASE ORAL at 08:55

## 2024-09-22 RX ADMIN — LIDOCAINE HYDROCHLORIDE 100 MG: 20 INJECTION, SOLUTION EPIDURAL; INFILTRATION; INTRACAUDAL; PERINEURAL at 15:06

## 2024-09-22 RX ADMIN — HYDROMORPHONE HYDROCHLORIDE 0.4 MG: 2 INJECTION INTRAMUSCULAR; INTRAVENOUS; SUBCUTANEOUS at 15:06

## 2024-09-22 RX ADMIN — SODIUM CHLORIDE, POTASSIUM CHLORIDE, SODIUM LACTATE AND CALCIUM CHLORIDE: 600; 310; 30; 20 INJECTION, SOLUTION INTRAVENOUS at 15:03

## 2024-09-22 RX ADMIN — WATER 2000 MG: 1 INJECTION INTRAMUSCULAR; INTRAVENOUS; SUBCUTANEOUS at 21:47

## 2024-09-22 RX ADMIN — PANTOPRAZOLE SODIUM 40 MG: 40 TABLET, DELAYED RELEASE ORAL at 06:08

## 2024-09-22 RX ADMIN — PROPOFOL 70 MG: 10 INJECTION, EMULSION INTRAVENOUS at 15:09

## 2024-09-22 RX ADMIN — PHENYLEPHRINE HYDROCHLORIDE 40 MCG/MIN: 10 INJECTION INTRAVENOUS at 15:09

## 2024-09-22 RX ADMIN — WATER 2000 MG: 1 INJECTION INTRAMUSCULAR; INTRAVENOUS; SUBCUTANEOUS at 15:15

## 2024-09-22 RX ADMIN — MAGNESIUM SULFATE HEPTAHYDRATE 1000 MG: 1 INJECTION, SOLUTION INTRAVENOUS at 08:57

## 2024-09-22 RX ADMIN — TRANEXAMIC ACID 1000 MG: 100 INJECTION, SOLUTION INTRAVENOUS at 15:20

## 2024-09-22 RX ADMIN — MELATONIN 3 MG: at 21:48

## 2024-09-22 RX ADMIN — MORPHINE SULFATE 2 MG: 2 INJECTION, SOLUTION INTRAMUSCULAR; INTRAVENOUS at 21:48

## 2024-09-22 RX ADMIN — SODIUM CHLORIDE, PRESERVATIVE FREE 10 ML: 5 INJECTION INTRAVENOUS at 08:57

## 2024-09-22 RX ADMIN — HYDROMORPHONE HYDROCHLORIDE 0.4 MG: 2 INJECTION INTRAMUSCULAR; INTRAVENOUS; SUBCUTANEOUS at 15:43

## 2024-09-22 RX ADMIN — Medication 40 MCG: at 15:29

## 2024-09-22 RX ADMIN — DEXAMETHASONE SODIUM PHOSPHATE 8 MG: 4 INJECTION INTRA-ARTICULAR; INTRALESIONAL; INTRAMUSCULAR; INTRAVENOUS; SOFT TISSUE at 15:21

## 2024-09-22 RX ADMIN — MORPHINE SULFATE 2 MG: 2 INJECTION, SOLUTION INTRAMUSCULAR; INTRAVENOUS at 18:09

## 2024-09-22 RX ADMIN — ATORVASTATIN CALCIUM 10 MG: 10 TABLET, FILM COATED ORAL at 21:48

## 2024-09-22 RX ADMIN — SUCCINYLCHOLINE CHLORIDE 120 MG: 20 INJECTION, SOLUTION INTRAMUSCULAR; INTRAVENOUS at 15:09

## 2024-09-22 RX ADMIN — IPRATROPIUM BROMIDE AND ALBUTEROL SULFATE 1 DOSE: .5; 3 SOLUTION RESPIRATORY (INHALATION) at 14:34

## 2024-09-22 RX ADMIN — MORPHINE SULFATE 2 MG: 2 INJECTION, SOLUTION INTRAMUSCULAR; INTRAVENOUS at 00:52

## 2024-09-22 RX ADMIN — ROCURONIUM BROMIDE 10 MG: 10 INJECTION INTRAVENOUS at 15:09

## 2024-09-22 RX ADMIN — ONDANSETRON 4 MG: 2 INJECTION INTRAMUSCULAR; INTRAVENOUS at 15:37

## 2024-09-22 RX ADMIN — AMLODIPINE BESYLATE 10 MG: 5 TABLET ORAL at 08:55

## 2024-09-22 RX ADMIN — SODIUM CHLORIDE: 9 INJECTION, SOLUTION INTRAVENOUS at 16:58

## 2024-09-22 RX ADMIN — ARFORMOTEROL TARTRATE: 15 SOLUTION RESPIRATORY (INHALATION) at 19:38

## 2024-09-22 RX ADMIN — MORPHINE SULFATE 2 MG: 2 INJECTION, SOLUTION INTRAMUSCULAR; INTRAVENOUS at 06:18

## 2024-09-22 RX ADMIN — FENTANYL CITRATE 25 MCG: 50 INJECTION, SOLUTION INTRAMUSCULAR; INTRAVENOUS at 15:17

## 2024-09-22 RX ADMIN — TIZANIDINE 2 MG: 2 TABLET ORAL at 08:55

## 2024-09-22 RX ADMIN — SODIUM CHLORIDE, PRESERVATIVE FREE 10 ML: 5 INJECTION INTRAVENOUS at 21:00

## 2024-09-22 RX ADMIN — ARFORMOTEROL TARTRATE: 15 SOLUTION RESPIRATORY (INHALATION) at 08:40

## 2024-09-22 RX ADMIN — SUGAMMADEX 200 MG: 100 INJECTION, SOLUTION INTRAVENOUS at 15:38

## 2024-09-22 RX ADMIN — MORPHINE SULFATE 2 MG: 2 INJECTION, SOLUTION INTRAMUSCULAR; INTRAVENOUS at 09:45

## 2024-09-22 ASSESSMENT — PAIN DESCRIPTION - ONSET
ONSET: GRADUAL
ONSET: GRADUAL

## 2024-09-22 ASSESSMENT — PAIN DESCRIPTION - DESCRIPTORS
DESCRIPTORS: ACHING;SHARP
DESCRIPTORS: ACHING;DULL
DESCRIPTORS: ACHING;SHARP
DESCRIPTORS: DULL
DESCRIPTORS: ACHING;DULL

## 2024-09-22 ASSESSMENT — PAIN DESCRIPTION - FREQUENCY
FREQUENCY: INTERMITTENT
FREQUENCY: INTERMITTENT

## 2024-09-22 ASSESSMENT — PAIN SCALES - GENERAL
PAINLEVEL_OUTOF10: 9
PAINLEVEL_OUTOF10: 0
PAINLEVEL_OUTOF10: 7
PAINLEVEL_OUTOF10: 8
PAINLEVEL_OUTOF10: 5
PAINLEVEL_OUTOF10: 0
PAINLEVEL_OUTOF10: 0
PAINLEVEL_OUTOF10: 10
PAINLEVEL_OUTOF10: 8

## 2024-09-22 ASSESSMENT — PAIN DESCRIPTION - ORIENTATION
ORIENTATION: RIGHT;LEFT
ORIENTATION: RIGHT;LEFT
ORIENTATION: RIGHT
ORIENTATION: LEFT

## 2024-09-22 ASSESSMENT — PAIN DESCRIPTION - LOCATION
LOCATION: HIP

## 2024-09-22 ASSESSMENT — PAIN SCALES - WONG BAKER: WONGBAKER_NUMERICALRESPONSE: NO HURT

## 2024-09-22 ASSESSMENT — PAIN - FUNCTIONAL ASSESSMENT: PAIN_FUNCTIONAL_ASSESSMENT: PREVENTS OR INTERFERES SOME ACTIVE ACTIVITIES AND ADLS

## 2024-09-22 ASSESSMENT — PAIN DESCRIPTION - PAIN TYPE: TYPE: ACUTE PAIN

## 2024-09-23 LAB
ANION GAP SERPL CALC-SCNC: 5 MMOL/L (ref 2–12)
BUN SERPL-MCNC: 13 MG/DL (ref 6–20)
BUN/CREAT SERPL: 21 (ref 12–20)
CALCIUM SERPL-MCNC: 8.3 MG/DL (ref 8.5–10.1)
CHLORIDE SERPL-SCNC: 106 MMOL/L (ref 97–108)
CO2 SERPL-SCNC: 26 MMOL/L (ref 21–32)
CREAT SERPL-MCNC: 0.63 MG/DL (ref 0.7–1.3)
EKG ATRIAL RATE: 95 BPM
EKG DIAGNOSIS: NORMAL
EKG P AXIS: 57 DEGREES
EKG P-R INTERVAL: 180 MS
EKG Q-T INTERVAL: 362 MS
EKG QRS DURATION: 92 MS
EKG QTC CALCULATION (BAZETT): 454 MS
EKG R AXIS: -2 DEGREES
EKG T AXIS: 64 DEGREES
EKG VENTRICULAR RATE: 95 BPM
ERYTHROCYTE [DISTWIDTH] IN BLOOD BY AUTOMATED COUNT: 13 % (ref 11.5–14.5)
GLUCOSE SERPL-MCNC: 164 MG/DL (ref 65–100)
HCT VFR BLD AUTO: 35.5 % (ref 36.6–50.3)
HGB BLD-MCNC: 11.7 G/DL (ref 12.1–17)
MAGNESIUM SERPL-MCNC: 2.1 MG/DL (ref 1.6–2.4)
MCH RBC QN AUTO: 32 PG (ref 26–34)
MCHC RBC AUTO-ENTMCNC: 33 G/DL (ref 30–36.5)
MCV RBC AUTO: 97 FL (ref 80–99)
NRBC # BLD: 0 K/UL (ref 0–0.01)
NRBC BLD-RTO: 0 PER 100 WBC
PHOSPHATE SERPL-MCNC: 2.8 MG/DL (ref 2.6–4.7)
PLATELET # BLD AUTO: 187 K/UL (ref 150–400)
PMV BLD AUTO: 9.8 FL (ref 8.9–12.9)
POTASSIUM SERPL-SCNC: 4.5 MMOL/L (ref 3.5–5.1)
RBC # BLD AUTO: 3.66 M/UL (ref 4.1–5.7)
SODIUM SERPL-SCNC: 137 MMOL/L (ref 136–145)
WBC # BLD AUTO: 8 K/UL (ref 4.1–11.1)

## 2024-09-23 PROCEDURE — 2580000003 HC RX 258: Performed by: ORTHOPAEDIC SURGERY

## 2024-09-23 PROCEDURE — 6370000000 HC RX 637 (ALT 250 FOR IP): Performed by: ORTHOPAEDIC SURGERY

## 2024-09-23 PROCEDURE — APPNB30 APP NON BILLABLE TIME 0-30 MINS: Performed by: ORTHOPAEDIC SURGERY

## 2024-09-23 PROCEDURE — 1100000003 HC PRIVATE W/ TELEMETRY

## 2024-09-23 PROCEDURE — 84100 ASSAY OF PHOSPHORUS: CPT

## 2024-09-23 PROCEDURE — 6360000002 HC RX W HCPCS: Performed by: ORTHOPAEDIC SURGERY

## 2024-09-23 PROCEDURE — 2700000000 HC OXYGEN THERAPY PER DAY

## 2024-09-23 PROCEDURE — 97116 GAIT TRAINING THERAPY: CPT

## 2024-09-23 PROCEDURE — 97166 OT EVAL MOD COMPLEX 45 MIN: CPT

## 2024-09-23 PROCEDURE — 94640 AIRWAY INHALATION TREATMENT: CPT

## 2024-09-23 PROCEDURE — 51798 US URINE CAPACITY MEASURE: CPT

## 2024-09-23 PROCEDURE — 36415 COLL VENOUS BLD VENIPUNCTURE: CPT

## 2024-09-23 PROCEDURE — 97162 PT EVAL MOD COMPLEX 30 MIN: CPT

## 2024-09-23 PROCEDURE — 83735 ASSAY OF MAGNESIUM: CPT

## 2024-09-23 PROCEDURE — 80048 BASIC METABOLIC PNL TOTAL CA: CPT

## 2024-09-23 PROCEDURE — 94761 N-INVAS EAR/PLS OXIMETRY MLT: CPT

## 2024-09-23 PROCEDURE — 85027 COMPLETE CBC AUTOMATED: CPT

## 2024-09-23 PROCEDURE — 97530 THERAPEUTIC ACTIVITIES: CPT

## 2024-09-23 RX ORDER — OXYCODONE HYDROCHLORIDE 5 MG/1
5 TABLET ORAL EVERY 4 HOURS PRN
Status: DISCONTINUED | OUTPATIENT
Start: 2024-09-23 | End: 2024-09-27 | Stop reason: HOSPADM

## 2024-09-23 RX ADMIN — OXYCODONE 5 MG: 5 TABLET ORAL at 14:54

## 2024-09-23 RX ADMIN — ATORVASTATIN CALCIUM 10 MG: 10 TABLET, FILM COATED ORAL at 19:25

## 2024-09-23 RX ADMIN — SODIUM CHLORIDE: 9 INJECTION, SOLUTION INTRAVENOUS at 04:03

## 2024-09-23 RX ADMIN — AMLODIPINE BESYLATE 10 MG: 5 TABLET ORAL at 10:11

## 2024-09-23 RX ADMIN — OXYCODONE 5 MG: 5 TABLET ORAL at 10:11

## 2024-09-23 RX ADMIN — OXYCODONE 5 MG: 5 TABLET ORAL at 19:24

## 2024-09-23 RX ADMIN — WATER 2000 MG: 1 INJECTION INTRAMUSCULAR; INTRAVENOUS; SUBCUTANEOUS at 06:37

## 2024-09-23 RX ADMIN — TIZANIDINE 2 MG: 2 TABLET ORAL at 10:11

## 2024-09-23 RX ADMIN — ARFORMOTEROL TARTRATE: 15 SOLUTION RESPIRATORY (INHALATION) at 09:34

## 2024-09-23 RX ADMIN — ARFORMOTEROL TARTRATE: 15 SOLUTION RESPIRATORY (INHALATION) at 21:56

## 2024-09-23 RX ADMIN — MELATONIN 3 MG: at 19:25

## 2024-09-23 RX ADMIN — PANTOPRAZOLE SODIUM 40 MG: 40 TABLET, DELAYED RELEASE ORAL at 06:36

## 2024-09-23 RX ADMIN — SODIUM CHLORIDE, PRESERVATIVE FREE 10 ML: 5 INJECTION INTRAVENOUS at 19:54

## 2024-09-23 ASSESSMENT — PAIN DESCRIPTION - LOCATION
LOCATION: HIP

## 2024-09-23 ASSESSMENT — PAIN SCALES - GENERAL
PAINLEVEL_OUTOF10: 10
PAINLEVEL_OUTOF10: 8
PAINLEVEL_OUTOF10: 2
PAINLEVEL_OUTOF10: 2
PAINLEVEL_OUTOF10: 5
PAINLEVEL_OUTOF10: 5
PAINLEVEL_OUTOF10: 2

## 2024-09-23 ASSESSMENT — PAIN DESCRIPTION - ORIENTATION
ORIENTATION: RIGHT

## 2024-09-23 ASSESSMENT — PAIN DESCRIPTION - DESCRIPTORS
DESCRIPTORS: ACHING

## 2024-09-24 ENCOUNTER — APPOINTMENT (OUTPATIENT)
Facility: HOSPITAL | Age: 79
DRG: 482 | End: 2024-09-24
Payer: MEDICARE

## 2024-09-24 LAB
ANION GAP SERPL CALC-SCNC: 7 MMOL/L (ref 2–12)
BUN SERPL-MCNC: 12 MG/DL (ref 6–20)
BUN/CREAT SERPL: 17 (ref 12–20)
CALCIUM SERPL-MCNC: 8.3 MG/DL (ref 8.5–10.1)
CHLORIDE SERPL-SCNC: 101 MMOL/L (ref 97–108)
CO2 SERPL-SCNC: 25 MMOL/L (ref 21–32)
CREAT SERPL-MCNC: 0.71 MG/DL (ref 0.7–1.3)
ERYTHROCYTE [DISTWIDTH] IN BLOOD BY AUTOMATED COUNT: 13.2 % (ref 11.5–14.5)
GLUCOSE SERPL-MCNC: 133 MG/DL (ref 65–100)
HCT VFR BLD AUTO: 34.8 % (ref 36.6–50.3)
HGB BLD-MCNC: 11.5 G/DL (ref 12.1–17)
MAGNESIUM SERPL-MCNC: 1.9 MG/DL (ref 1.6–2.4)
MCH RBC QN AUTO: 31.6 PG (ref 26–34)
MCHC RBC AUTO-ENTMCNC: 33 G/DL (ref 30–36.5)
MCV RBC AUTO: 95.6 FL (ref 80–99)
NRBC # BLD: 0 K/UL (ref 0–0.01)
NRBC BLD-RTO: 0 PER 100 WBC
PHOSPHATE SERPL-MCNC: 2.1 MG/DL (ref 2.6–4.7)
PLATELET # BLD AUTO: 191 K/UL (ref 150–400)
PMV BLD AUTO: 10.1 FL (ref 8.9–12.9)
POTASSIUM SERPL-SCNC: 3.9 MMOL/L (ref 3.5–5.1)
RBC # BLD AUTO: 3.64 M/UL (ref 4.1–5.7)
SODIUM SERPL-SCNC: 133 MMOL/L (ref 136–145)
WBC # BLD AUTO: 8.5 K/UL (ref 4.1–11.1)

## 2024-09-24 PROCEDURE — 94640 AIRWAY INHALATION TREATMENT: CPT

## 2024-09-24 PROCEDURE — 2500000003 HC RX 250 WO HCPCS: Performed by: INTERNAL MEDICINE

## 2024-09-24 PROCEDURE — 2580000003 HC RX 258: Performed by: INTERNAL MEDICINE

## 2024-09-24 PROCEDURE — 6360000002 HC RX W HCPCS: Performed by: INTERNAL MEDICINE

## 2024-09-24 PROCEDURE — 94761 N-INVAS EAR/PLS OXIMETRY MLT: CPT

## 2024-09-24 PROCEDURE — 6370000000 HC RX 637 (ALT 250 FOR IP): Performed by: ORTHOPAEDIC SURGERY

## 2024-09-24 PROCEDURE — 6360000002 HC RX W HCPCS: Performed by: ORTHOPAEDIC SURGERY

## 2024-09-24 PROCEDURE — 85027 COMPLETE CBC AUTOMATED: CPT

## 2024-09-24 PROCEDURE — 84100 ASSAY OF PHOSPHORUS: CPT

## 2024-09-24 PROCEDURE — 73630 X-RAY EXAM OF FOOT: CPT

## 2024-09-24 PROCEDURE — 83735 ASSAY OF MAGNESIUM: CPT

## 2024-09-24 PROCEDURE — 1100000003 HC PRIVATE W/ TELEMETRY

## 2024-09-24 PROCEDURE — 80048 BASIC METABOLIC PNL TOTAL CA: CPT

## 2024-09-24 PROCEDURE — 94760 N-INVAS EAR/PLS OXIMETRY 1: CPT

## 2024-09-24 PROCEDURE — 97530 THERAPEUTIC ACTIVITIES: CPT

## 2024-09-24 PROCEDURE — 6370000000 HC RX 637 (ALT 250 FOR IP): Performed by: INTERNAL MEDICINE

## 2024-09-24 PROCEDURE — 36415 COLL VENOUS BLD VENIPUNCTURE: CPT

## 2024-09-24 RX ORDER — KETOROLAC TROMETHAMINE 30 MG/ML
15 INJECTION, SOLUTION INTRAMUSCULAR; INTRAVENOUS ONCE
Status: COMPLETED | OUTPATIENT
Start: 2024-09-24 | End: 2024-09-24

## 2024-09-24 RX ADMIN — ENOXAPARIN SODIUM 40 MG: 100 INJECTION SUBCUTANEOUS at 08:05

## 2024-09-24 RX ADMIN — SODIUM PHOSPHATE, MONOBASIC, MONOHYDRATE AND SODIUM PHOSPHATE, DIBASIC, ANHYDROUS 30 MMOL: 142; 276 INJECTION, SOLUTION INTRAVENOUS at 11:08

## 2024-09-24 RX ADMIN — ATORVASTATIN CALCIUM 10 MG: 10 TABLET, FILM COATED ORAL at 23:45

## 2024-09-24 RX ADMIN — TIZANIDINE 2 MG: 2 TABLET ORAL at 08:05

## 2024-09-24 RX ADMIN — KETOROLAC TROMETHAMINE 15 MG: 30 INJECTION, SOLUTION INTRAMUSCULAR at 10:08

## 2024-09-24 RX ADMIN — ARFORMOTEROL TARTRATE: 15 SOLUTION RESPIRATORY (INHALATION) at 20:14

## 2024-09-24 RX ADMIN — POTASSIUM & SODIUM PHOSPHATES POWDER PACK 280-160-250 MG 1000 MG: 280-160-250 PACK at 11:04

## 2024-09-24 RX ADMIN — POTASSIUM & SODIUM PHOSPHATES POWDER PACK 280-160-250 MG 1000 MG: 280-160-250 PACK at 10:08

## 2024-09-24 RX ADMIN — OXYCODONE 5 MG: 5 TABLET ORAL at 08:05

## 2024-09-24 RX ADMIN — PANTOPRAZOLE SODIUM 40 MG: 40 TABLET, DELAYED RELEASE ORAL at 06:12

## 2024-09-24 RX ADMIN — AMLODIPINE BESYLATE 10 MG: 5 TABLET ORAL at 08:05

## 2024-09-24 RX ADMIN — ARFORMOTEROL TARTRATE: 15 SOLUTION RESPIRATORY (INHALATION) at 07:52

## 2024-09-24 RX ADMIN — POLYETHYLENE GLYCOL 3350 17 G: 17 POWDER, FOR SOLUTION ORAL at 08:05

## 2024-09-24 RX ADMIN — SENNOSIDES AND DOCUSATE SODIUM 2 TABLET: 50; 8.6 TABLET ORAL at 08:05

## 2024-09-24 RX ADMIN — MELATONIN 3 MG: at 23:45

## 2024-09-24 RX ADMIN — OXYCODONE 5 MG: 5 TABLET ORAL at 18:16

## 2024-09-24 ASSESSMENT — PAIN DESCRIPTION - ORIENTATION
ORIENTATION: RIGHT
ORIENTATION: RIGHT

## 2024-09-24 ASSESSMENT — PAIN SCALES - GENERAL
PAINLEVEL_OUTOF10: 4
PAINLEVEL_OUTOF10: 8
PAINLEVEL_OUTOF10: 3
PAINLEVEL_OUTOF10: 4

## 2024-09-24 ASSESSMENT — PAIN DESCRIPTION - LOCATION
LOCATION: HIP;ANKLE
LOCATION: HIP;LEG;FOOT

## 2024-09-24 ASSESSMENT — PAIN DESCRIPTION - DESCRIPTORS
DESCRIPTORS: ACHING;THROBBING
DESCRIPTORS: ACHING;THROBBING

## 2024-09-25 LAB
ANION GAP SERPL CALC-SCNC: 8 MMOL/L (ref 2–12)
BUN SERPL-MCNC: 15 MG/DL (ref 6–20)
BUN/CREAT SERPL: 20 (ref 12–20)
CALCIUM SERPL-MCNC: 8.2 MG/DL (ref 8.5–10.1)
CHLORIDE SERPL-SCNC: 96 MMOL/L (ref 97–108)
CO2 SERPL-SCNC: 27 MMOL/L (ref 21–32)
CREAT SERPL-MCNC: 0.74 MG/DL (ref 0.7–1.3)
ERYTHROCYTE [DISTWIDTH] IN BLOOD BY AUTOMATED COUNT: 12.8 % (ref 11.5–14.5)
GLUCOSE SERPL-MCNC: 132 MG/DL (ref 65–100)
HCT VFR BLD AUTO: 31.8 % (ref 36.6–50.3)
HGB BLD-MCNC: 10.8 G/DL (ref 12.1–17)
MAGNESIUM SERPL-MCNC: 1.9 MG/DL (ref 1.6–2.4)
MCH RBC QN AUTO: 32 PG (ref 26–34)
MCHC RBC AUTO-ENTMCNC: 34 G/DL (ref 30–36.5)
MCV RBC AUTO: 94.1 FL (ref 80–99)
NRBC # BLD: 0 K/UL (ref 0–0.01)
NRBC BLD-RTO: 0 PER 100 WBC
PHOSPHATE SERPL-MCNC: 2.6 MG/DL (ref 2.6–4.7)
PLATELET # BLD AUTO: 192 K/UL (ref 150–400)
PMV BLD AUTO: 9.6 FL (ref 8.9–12.9)
POTASSIUM SERPL-SCNC: 3.6 MMOL/L (ref 3.5–5.1)
RBC # BLD AUTO: 3.38 M/UL (ref 4.1–5.7)
SODIUM SERPL-SCNC: 131 MMOL/L (ref 136–145)
WBC # BLD AUTO: 8.9 K/UL (ref 4.1–11.1)

## 2024-09-25 PROCEDURE — 36415 COLL VENOUS BLD VENIPUNCTURE: CPT

## 2024-09-25 PROCEDURE — 94761 N-INVAS EAR/PLS OXIMETRY MLT: CPT

## 2024-09-25 PROCEDURE — 94640 AIRWAY INHALATION TREATMENT: CPT

## 2024-09-25 PROCEDURE — 1100000003 HC PRIVATE W/ TELEMETRY

## 2024-09-25 PROCEDURE — 2580000003 HC RX 258: Performed by: ORTHOPAEDIC SURGERY

## 2024-09-25 PROCEDURE — 80048 BASIC METABOLIC PNL TOTAL CA: CPT

## 2024-09-25 PROCEDURE — 85027 COMPLETE CBC AUTOMATED: CPT

## 2024-09-25 PROCEDURE — 6370000000 HC RX 637 (ALT 250 FOR IP): Performed by: ORTHOPAEDIC SURGERY

## 2024-09-25 PROCEDURE — APPNB15 APP NON BILLABLE TIME 0-15 MINS: Performed by: PHYSICIAN ASSISTANT

## 2024-09-25 PROCEDURE — 6370000000 HC RX 637 (ALT 250 FOR IP): Performed by: STUDENT IN AN ORGANIZED HEALTH CARE EDUCATION/TRAINING PROGRAM

## 2024-09-25 PROCEDURE — 6360000002 HC RX W HCPCS: Performed by: ORTHOPAEDIC SURGERY

## 2024-09-25 PROCEDURE — 83735 ASSAY OF MAGNESIUM: CPT

## 2024-09-25 PROCEDURE — 84100 ASSAY OF PHOSPHORUS: CPT

## 2024-09-25 PROCEDURE — 87040 BLOOD CULTURE FOR BACTERIA: CPT

## 2024-09-25 RX ORDER — COLCHICINE 0.6 MG/1
1.2 TABLET ORAL ONCE
Status: COMPLETED | OUTPATIENT
Start: 2024-09-25 | End: 2024-09-25

## 2024-09-25 RX ORDER — ACETAMINOPHEN 500 MG
1000 TABLET ORAL ONCE
Status: COMPLETED | OUTPATIENT
Start: 2024-09-25 | End: 2024-09-25

## 2024-09-25 RX ORDER — COLCHICINE 0.6 MG/1
0.6 TABLET ORAL DAILY
Status: DISCONTINUED | OUTPATIENT
Start: 2024-09-25 | End: 2024-09-27 | Stop reason: HOSPADM

## 2024-09-25 RX ADMIN — ACETAMINOPHEN 650 MG: 325 TABLET ORAL at 12:44

## 2024-09-25 RX ADMIN — AMLODIPINE BESYLATE 10 MG: 5 TABLET ORAL at 09:21

## 2024-09-25 RX ADMIN — COLCHICINE 1.2 MG: 0.6 TABLET, FILM COATED ORAL at 15:42

## 2024-09-25 RX ADMIN — ARFORMOTEROL TARTRATE: 15 SOLUTION RESPIRATORY (INHALATION) at 08:00

## 2024-09-25 RX ADMIN — COLCHICINE 0.6 MG: 0.6 TABLET, FILM COATED ORAL at 16:44

## 2024-09-25 RX ADMIN — SODIUM CHLORIDE, PRESERVATIVE FREE 10 ML: 5 INJECTION INTRAVENOUS at 21:48

## 2024-09-25 RX ADMIN — OXYCODONE 5 MG: 5 TABLET ORAL at 22:02

## 2024-09-25 RX ADMIN — ENOXAPARIN SODIUM 40 MG: 100 INJECTION SUBCUTANEOUS at 09:21

## 2024-09-25 RX ADMIN — MELATONIN 3 MG: at 21:48

## 2024-09-25 RX ADMIN — OXYCODONE 5 MG: 5 TABLET ORAL at 06:33

## 2024-09-25 RX ADMIN — TIZANIDINE 2 MG: 2 TABLET ORAL at 09:21

## 2024-09-25 RX ADMIN — OXYCODONE 5 MG: 5 TABLET ORAL at 10:39

## 2024-09-25 RX ADMIN — PANTOPRAZOLE SODIUM 40 MG: 40 TABLET, DELAYED RELEASE ORAL at 06:25

## 2024-09-25 RX ADMIN — ARFORMOTEROL TARTRATE: 15 SOLUTION RESPIRATORY (INHALATION) at 21:00

## 2024-09-25 RX ADMIN — ATORVASTATIN CALCIUM 10 MG: 10 TABLET, FILM COATED ORAL at 21:48

## 2024-09-25 RX ADMIN — ACETAMINOPHEN 1000 MG: 500 TABLET ORAL at 15:43

## 2024-09-25 ASSESSMENT — PAIN SCALES - GENERAL
PAINLEVEL_OUTOF10: 5
PAINLEVEL_OUTOF10: 2
PAINLEVEL_OUTOF10: 5
PAINLEVEL_OUTOF10: 2
PAINLEVEL_OUTOF10: 4
PAINLEVEL_OUTOF10: 3
PAINLEVEL_OUTOF10: 2

## 2024-09-25 ASSESSMENT — PAIN DESCRIPTION - ORIENTATION
ORIENTATION: RIGHT

## 2024-09-25 ASSESSMENT — PAIN DESCRIPTION - LOCATION
LOCATION: HIP
LOCATION: HIP;LEG
LOCATION: HIP

## 2024-09-25 ASSESSMENT — PAIN DESCRIPTION - DESCRIPTORS
DESCRIPTORS: ACHING
DESCRIPTORS: ACHING

## 2024-09-26 LAB
ANION GAP SERPL CALC-SCNC: 7 MMOL/L (ref 2–12)
BASOPHILS # BLD: 0 K/UL (ref 0–0.1)
BASOPHILS NFR BLD: 0 % (ref 0–1)
BUN SERPL-MCNC: 17 MG/DL (ref 6–20)
BUN/CREAT SERPL: 27 (ref 12–20)
CALCIUM SERPL-MCNC: 8.6 MG/DL (ref 8.5–10.1)
CHLORIDE SERPL-SCNC: 99 MMOL/L (ref 97–108)
CO2 SERPL-SCNC: 29 MMOL/L (ref 21–32)
CREAT SERPL-MCNC: 0.62 MG/DL (ref 0.7–1.3)
DIFFERENTIAL METHOD BLD: ABNORMAL
EOSINOPHIL # BLD: 0.1 K/UL (ref 0–0.4)
EOSINOPHIL NFR BLD: 1 % (ref 0–7)
ERYTHROCYTE [DISTWIDTH] IN BLOOD BY AUTOMATED COUNT: 12.9 % (ref 11.5–14.5)
GLUCOSE SERPL-MCNC: 131 MG/DL (ref 65–100)
HCT VFR BLD AUTO: 31.9 % (ref 36.6–50.3)
HGB BLD-MCNC: 10.6 G/DL (ref 12.1–17)
IMM GRANULOCYTES # BLD AUTO: 0 K/UL (ref 0–0.04)
IMM GRANULOCYTES NFR BLD AUTO: 0 % (ref 0–0.5)
LYMPHOCYTES # BLD: 0.5 K/UL (ref 0.8–3.5)
LYMPHOCYTES NFR BLD: 7 % (ref 12–49)
MAGNESIUM SERPL-MCNC: 2.3 MG/DL (ref 1.6–2.4)
MCH RBC QN AUTO: 31.2 PG (ref 26–34)
MCHC RBC AUTO-ENTMCNC: 33.2 G/DL (ref 30–36.5)
MCV RBC AUTO: 93.8 FL (ref 80–99)
MONOCYTES # BLD: 1.2 K/UL (ref 0–1)
MONOCYTES NFR BLD: 16 % (ref 5–13)
NEUTS SEG # BLD: 5.8 K/UL (ref 1.8–8)
NEUTS SEG NFR BLD: 76 % (ref 32–75)
NRBC # BLD: 0 K/UL (ref 0–0.01)
NRBC BLD-RTO: 0 PER 100 WBC
PHOSPHATE SERPL-MCNC: 2.4 MG/DL (ref 2.6–4.7)
PLATELET # BLD AUTO: 220 K/UL (ref 150–400)
PMV BLD AUTO: 9.8 FL (ref 8.9–12.9)
POTASSIUM SERPL-SCNC: 3.8 MMOL/L (ref 3.5–5.1)
RBC # BLD AUTO: 3.4 M/UL (ref 4.1–5.7)
RBC MORPH BLD: ABNORMAL
SODIUM SERPL-SCNC: 135 MMOL/L (ref 136–145)
WBC # BLD AUTO: 7.6 K/UL (ref 4.1–11.1)

## 2024-09-26 PROCEDURE — 94761 N-INVAS EAR/PLS OXIMETRY MLT: CPT

## 2024-09-26 PROCEDURE — 97116 GAIT TRAINING THERAPY: CPT

## 2024-09-26 PROCEDURE — 85025 COMPLETE CBC W/AUTO DIFF WBC: CPT

## 2024-09-26 PROCEDURE — 6370000000 HC RX 637 (ALT 250 FOR IP): Performed by: STUDENT IN AN ORGANIZED HEALTH CARE EDUCATION/TRAINING PROGRAM

## 2024-09-26 PROCEDURE — 6370000000 HC RX 637 (ALT 250 FOR IP): Performed by: ORTHOPAEDIC SURGERY

## 2024-09-26 PROCEDURE — 84100 ASSAY OF PHOSPHORUS: CPT

## 2024-09-26 PROCEDURE — 1100000003 HC PRIVATE W/ TELEMETRY

## 2024-09-26 PROCEDURE — 97530 THERAPEUTIC ACTIVITIES: CPT

## 2024-09-26 PROCEDURE — 83735 ASSAY OF MAGNESIUM: CPT

## 2024-09-26 PROCEDURE — 36415 COLL VENOUS BLD VENIPUNCTURE: CPT

## 2024-09-26 PROCEDURE — 6360000002 HC RX W HCPCS: Performed by: ORTHOPAEDIC SURGERY

## 2024-09-26 PROCEDURE — 93005 ELECTROCARDIOGRAM TRACING: CPT | Performed by: STUDENT IN AN ORGANIZED HEALTH CARE EDUCATION/TRAINING PROGRAM

## 2024-09-26 PROCEDURE — 2580000003 HC RX 258: Performed by: ORTHOPAEDIC SURGERY

## 2024-09-26 PROCEDURE — APPNB30 APP NON BILLABLE TIME 0-30 MINS: Performed by: PHYSICIAN ASSISTANT

## 2024-09-26 PROCEDURE — 80048 BASIC METABOLIC PNL TOTAL CA: CPT

## 2024-09-26 PROCEDURE — 94640 AIRWAY INHALATION TREATMENT: CPT

## 2024-09-26 RX ADMIN — TIZANIDINE 2 MG: 2 TABLET ORAL at 08:35

## 2024-09-26 RX ADMIN — OXYCODONE 5 MG: 5 TABLET ORAL at 08:34

## 2024-09-26 RX ADMIN — ENOXAPARIN SODIUM 40 MG: 100 INJECTION SUBCUTANEOUS at 08:36

## 2024-09-26 RX ADMIN — ARFORMOTEROL TARTRATE: 15 SOLUTION RESPIRATORY (INHALATION) at 20:40

## 2024-09-26 RX ADMIN — AMLODIPINE BESYLATE 10 MG: 5 TABLET ORAL at 08:35

## 2024-09-26 RX ADMIN — ATORVASTATIN CALCIUM 10 MG: 10 TABLET, FILM COATED ORAL at 21:45

## 2024-09-26 RX ADMIN — PANTOPRAZOLE SODIUM 40 MG: 40 TABLET, DELAYED RELEASE ORAL at 07:07

## 2024-09-26 RX ADMIN — SODIUM CHLORIDE, PRESERVATIVE FREE 10 ML: 5 INJECTION INTRAVENOUS at 21:46

## 2024-09-26 RX ADMIN — COLCHICINE 0.6 MG: 0.6 TABLET, FILM COATED ORAL at 08:35

## 2024-09-26 RX ADMIN — MELATONIN 3 MG: at 21:45

## 2024-09-26 RX ADMIN — SENNOSIDES AND DOCUSATE SODIUM 2 TABLET: 50; 8.6 TABLET ORAL at 08:36

## 2024-09-26 RX ADMIN — ACETAMINOPHEN 650 MG: 325 TABLET ORAL at 16:38

## 2024-09-26 RX ADMIN — OXYCODONE 5 MG: 5 TABLET ORAL at 16:38

## 2024-09-26 RX ADMIN — ARFORMOTEROL TARTRATE: 15 SOLUTION RESPIRATORY (INHALATION) at 07:55

## 2024-09-26 RX ADMIN — OXYCODONE 5 MG: 5 TABLET ORAL at 21:45

## 2024-09-26 ASSESSMENT — PAIN SCALES - GENERAL
PAINLEVEL_OUTOF10: 2
PAINLEVEL_OUTOF10: 2
PAINLEVEL_OUTOF10: 7
PAINLEVEL_OUTOF10: 5
PAINLEVEL_OUTOF10: 8
PAINLEVEL_OUTOF10: 2

## 2024-09-26 ASSESSMENT — PAIN DESCRIPTION - LOCATION
LOCATION: HIP
LOCATION: HIP;FOOT

## 2024-09-26 ASSESSMENT — PAIN DESCRIPTION - DESCRIPTORS
DESCRIPTORS: ACHING

## 2024-09-26 ASSESSMENT — PAIN DESCRIPTION - ORIENTATION
ORIENTATION: RIGHT

## 2024-09-26 ASSESSMENT — PAIN DESCRIPTION - PAIN TYPE: TYPE: SURGICAL PAIN

## 2024-09-27 VITALS
WEIGHT: 142 LBS | HEART RATE: 103 BPM | SYSTOLIC BLOOD PRESSURE: 118 MMHG | BODY MASS INDEX: 19.88 KG/M2 | TEMPERATURE: 97.7 F | HEIGHT: 71 IN | OXYGEN SATURATION: 97 % | DIASTOLIC BLOOD PRESSURE: 83 MMHG | RESPIRATION RATE: 16 BRPM

## 2024-09-27 LAB
ANION GAP SERPL CALC-SCNC: 5 MMOL/L (ref 2–12)
BASOPHILS # BLD: 0 K/UL (ref 0–0.1)
BASOPHILS NFR BLD: 0 % (ref 0–1)
BUN SERPL-MCNC: 14 MG/DL (ref 6–20)
BUN/CREAT SERPL: 23 (ref 12–20)
CALCIUM SERPL-MCNC: 8.2 MG/DL (ref 8.5–10.1)
CHLORIDE SERPL-SCNC: 101 MMOL/L (ref 97–108)
CO2 SERPL-SCNC: 29 MMOL/L (ref 21–32)
CREAT SERPL-MCNC: 0.6 MG/DL (ref 0.7–1.3)
DIFFERENTIAL METHOD BLD: ABNORMAL
EKG ATRIAL RATE: 99 BPM
EKG DIAGNOSIS: NORMAL
EKG P AXIS: 50 DEGREES
EKG P-R INTERVAL: 166 MS
EKG Q-T INTERVAL: 344 MS
EKG QRS DURATION: 90 MS
EKG QTC CALCULATION (BAZETT): 441 MS
EKG R AXIS: -4 DEGREES
EKG T AXIS: 61 DEGREES
EKG VENTRICULAR RATE: 99 BPM
EOSINOPHIL # BLD: 0.1 K/UL (ref 0–0.4)
EOSINOPHIL NFR BLD: 1 % (ref 0–7)
ERYTHROCYTE [DISTWIDTH] IN BLOOD BY AUTOMATED COUNT: 12.9 % (ref 11.5–14.5)
GLUCOSE SERPL-MCNC: 124 MG/DL (ref 65–100)
HCT VFR BLD AUTO: 33.7 % (ref 36.6–50.3)
HGB BLD-MCNC: 11.2 G/DL (ref 12.1–17)
IMM GRANULOCYTES # BLD AUTO: 0 K/UL (ref 0–0.04)
IMM GRANULOCYTES NFR BLD AUTO: 0 % (ref 0–0.5)
LYMPHOCYTES # BLD: 0.6 K/UL (ref 0.8–3.5)
LYMPHOCYTES NFR BLD: 9 % (ref 12–49)
MAGNESIUM SERPL-MCNC: 2.1 MG/DL (ref 1.6–2.4)
MCH RBC QN AUTO: 31.7 PG (ref 26–34)
MCHC RBC AUTO-ENTMCNC: 33.2 G/DL (ref 30–36.5)
MCV RBC AUTO: 95.5 FL (ref 80–99)
MONOCYTES # BLD: 0.9 K/UL (ref 0–1)
MONOCYTES NFR BLD: 15 % (ref 5–13)
NEUTS SEG # BLD: 4.8 K/UL (ref 1.8–8)
NEUTS SEG NFR BLD: 75 % (ref 32–75)
NRBC # BLD: 0 K/UL (ref 0–0.01)
NRBC BLD-RTO: 0 PER 100 WBC
PHOSPHATE SERPL-MCNC: 2.7 MG/DL (ref 2.6–4.7)
PLATELET # BLD AUTO: 273 K/UL (ref 150–400)
PMV BLD AUTO: 9.8 FL (ref 8.9–12.9)
POTASSIUM SERPL-SCNC: 3.9 MMOL/L (ref 3.5–5.1)
RBC # BLD AUTO: 3.53 M/UL (ref 4.1–5.7)
SODIUM SERPL-SCNC: 135 MMOL/L (ref 136–145)
WBC # BLD AUTO: 6.4 K/UL (ref 4.1–11.1)

## 2024-09-27 PROCEDURE — 80048 BASIC METABOLIC PNL TOTAL CA: CPT

## 2024-09-27 PROCEDURE — 6360000002 HC RX W HCPCS: Performed by: ORTHOPAEDIC SURGERY

## 2024-09-27 PROCEDURE — 2700000000 HC OXYGEN THERAPY PER DAY

## 2024-09-27 PROCEDURE — 83735 ASSAY OF MAGNESIUM: CPT

## 2024-09-27 PROCEDURE — 84100 ASSAY OF PHOSPHORUS: CPT

## 2024-09-27 PROCEDURE — 6370000000 HC RX 637 (ALT 250 FOR IP): Performed by: ORTHOPAEDIC SURGERY

## 2024-09-27 PROCEDURE — 94760 N-INVAS EAR/PLS OXIMETRY 1: CPT

## 2024-09-27 PROCEDURE — 6370000000 HC RX 637 (ALT 250 FOR IP): Performed by: STUDENT IN AN ORGANIZED HEALTH CARE EDUCATION/TRAINING PROGRAM

## 2024-09-27 PROCEDURE — 2580000003 HC RX 258: Performed by: ORTHOPAEDIC SURGERY

## 2024-09-27 PROCEDURE — 85025 COMPLETE CBC W/AUTO DIFF WBC: CPT

## 2024-09-27 PROCEDURE — 94640 AIRWAY INHALATION TREATMENT: CPT

## 2024-09-27 PROCEDURE — 36415 COLL VENOUS BLD VENIPUNCTURE: CPT

## 2024-09-27 RX ORDER — COLCHICINE 0.6 MG/1
0.6 TABLET ORAL DAILY
Qty: 5 TABLET | Refills: 0 | Status: SHIPPED | OUTPATIENT
Start: 2024-09-28 | End: 2024-10-03

## 2024-09-27 RX ORDER — LANOLIN ALCOHOL/MO/W.PET/CERES
3 CREAM (GRAM) TOPICAL NIGHTLY
Qty: 30 TABLET | Refills: 0 | Status: SHIPPED | OUTPATIENT
Start: 2024-09-27 | End: 2024-10-27

## 2024-09-27 RX ORDER — OXYCODONE HYDROCHLORIDE 5 MG/1
5 TABLET ORAL EVERY 6 HOURS PRN
Qty: 20 TABLET | Refills: 0 | Status: SHIPPED | OUTPATIENT
Start: 2024-09-27 | End: 2024-09-30

## 2024-09-27 RX ORDER — ENOXAPARIN SODIUM 100 MG/ML
40 INJECTION SUBCUTANEOUS DAILY
Qty: 8.4 ML | Refills: 0 | Status: SHIPPED
Start: 2024-09-28 | End: 2024-10-19

## 2024-09-27 RX ADMIN — OXYCODONE 5 MG: 5 TABLET ORAL at 06:43

## 2024-09-27 RX ADMIN — TIZANIDINE 2 MG: 2 TABLET ORAL at 09:41

## 2024-09-27 RX ADMIN — ARFORMOTEROL TARTRATE: 15 SOLUTION RESPIRATORY (INHALATION) at 08:34

## 2024-09-27 RX ADMIN — COLCHICINE 0.6 MG: 0.6 TABLET, FILM COATED ORAL at 09:40

## 2024-09-27 RX ADMIN — SODIUM CHLORIDE, PRESERVATIVE FREE 10 ML: 5 INJECTION INTRAVENOUS at 09:46

## 2024-09-27 RX ADMIN — OXYCODONE 5 MG: 5 TABLET ORAL at 18:50

## 2024-09-27 RX ADMIN — PANTOPRAZOLE SODIUM 40 MG: 40 TABLET, DELAYED RELEASE ORAL at 06:43

## 2024-09-27 RX ADMIN — AMLODIPINE BESYLATE 10 MG: 5 TABLET ORAL at 09:39

## 2024-09-27 RX ADMIN — ACETAMINOPHEN 650 MG: 325 TABLET ORAL at 18:50

## 2024-09-27 RX ADMIN — ENOXAPARIN SODIUM 40 MG: 100 INJECTION SUBCUTANEOUS at 09:41

## 2024-09-27 ASSESSMENT — PAIN DESCRIPTION - ORIENTATION: ORIENTATION: RIGHT

## 2024-09-27 ASSESSMENT — PAIN SCALES - GENERAL
PAINLEVEL_OUTOF10: 0
PAINLEVEL_OUTOF10: 8
PAINLEVEL_OUTOF10: 0
PAINLEVEL_OUTOF10: 0
PAINLEVEL_OUTOF10: 5
PAINLEVEL_OUTOF10: 0

## 2024-09-27 ASSESSMENT — PAIN - FUNCTIONAL ASSESSMENT: PAIN_FUNCTIONAL_ASSESSMENT: PREVENTS OR INTERFERES SOME ACTIVE ACTIVITIES AND ADLS

## 2024-09-27 ASSESSMENT — PAIN SCALES - WONG BAKER: WONGBAKER_NUMERICALRESPONSE: NO HURT

## 2024-09-27 ASSESSMENT — PAIN DESCRIPTION - LOCATION: LOCATION: HIP

## 2024-09-27 ASSESSMENT — PAIN DESCRIPTION - DESCRIPTORS: DESCRIPTORS: ACHING

## 2024-09-29 LAB
BACTERIA SPEC CULT: NORMAL
BACTERIA SPEC CULT: NORMAL
SERVICE CMNT-IMP: NORMAL
SERVICE CMNT-IMP: NORMAL

## 2024-10-01 ENCOUNTER — TELEPHONE (OUTPATIENT)
Age: 79
End: 2024-10-01

## 2024-10-01 NOTE — TELEPHONE ENCOUNTER
Sasha is asking that Dr. So call her tomorrow on her cell phone.      She states pt is now in rehab after breaking his hip.  There will need to be equipment ordered and she would like a call to discuss that.

## 2024-10-03 ENCOUNTER — TELEPHONE (OUTPATIENT)
Age: 79
End: 2024-10-03

## 2024-10-03 NOTE — TELEPHONE ENCOUNTER
Pt broke his hip and will need a hfu.    He discharged from [facility] for therapy.  10-2-24 discharge date.    Pt will need a yumiko appt.

## 2024-10-09 ENCOUNTER — TELEPHONE (OUTPATIENT)
Age: 79
End: 2024-10-09

## 2024-10-09 NOTE — TELEPHONE ENCOUNTER
[facility]oraine [facility] states that discharge paper was incomplete.  When wife got the discharge paperwork everything is scattered and not in order.  Not clear at all.    Loraine was not able to get a good list of medications pt is to be taking and she needs that.    Please fax that list /pt has a visit tomorrow with Dr. So.  They could do after appt.    Fax #[phone]

## 2024-10-10 ENCOUNTER — OFFICE VISIT (OUTPATIENT)
Age: 79
End: 2024-10-10

## 2024-10-10 VITALS
DIASTOLIC BLOOD PRESSURE: 78 MMHG | HEIGHT: 71 IN | SYSTOLIC BLOOD PRESSURE: 112 MMHG | BODY MASS INDEX: 19.04 KG/M2 | HEART RATE: 92 BPM | TEMPERATURE: 98 F | RESPIRATION RATE: 18 BRPM | WEIGHT: 136 LBS | OXYGEN SATURATION: 98 %

## 2024-10-10 DIAGNOSIS — J43.9 PULMONARY EMPHYSEMA, UNSPECIFIED EMPHYSEMA TYPE (HCC): ICD-10-CM

## 2024-10-10 DIAGNOSIS — Z09 HOSPITAL DISCHARGE FOLLOW-UP: Primary | ICD-10-CM

## 2024-10-10 SDOH — ECONOMIC STABILITY: FOOD INSECURITY: WITHIN THE PAST 12 MONTHS, YOU WORRIED THAT YOUR FOOD WOULD RUN OUT BEFORE YOU GOT MONEY TO BUY MORE.: NEVER TRUE

## 2024-10-10 SDOH — ECONOMIC STABILITY: FOOD INSECURITY: WITHIN THE PAST 12 MONTHS, THE FOOD YOU BOUGHT JUST DIDN'T LAST AND YOU DIDN'T HAVE MONEY TO GET MORE.: NEVER TRUE

## 2024-10-10 ASSESSMENT — PATIENT HEALTH QUESTIONNAIRE - PHQ9
4. FEELING TIRED OR HAVING LITTLE ENERGY: NEARLY EVERY DAY
SUM OF ALL RESPONSES TO PHQ QUESTIONS 1-9: 11
6. FEELING BAD ABOUT YOURSELF - OR THAT YOU ARE A FAILURE OR HAVE LET YOURSELF OR YOUR FAMILY DOWN: NOT AT ALL
2. FEELING DOWN, DEPRESSED OR HOPELESS: NEARLY EVERY DAY
8. MOVING OR SPEAKING SO SLOWLY THAT OTHER PEOPLE COULD HAVE NOTICED. OR THE OPPOSITE, BEING SO FIGETY OR RESTLESS THAT YOU HAVE BEEN MOVING AROUND A LOT MORE THAN USUAL: NOT AT ALL
SUM OF ALL RESPONSES TO PHQ9 QUESTIONS 1 & 2: 6
1. LITTLE INTEREST OR PLEASURE IN DOING THINGS: NEARLY EVERY DAY
3. TROUBLE FALLING OR STAYING ASLEEP: SEVERAL DAYS
5. POOR APPETITE OR OVEREATING: NOT AT ALL
7. TROUBLE CONCENTRATING ON THINGS, SUCH AS READING THE NEWSPAPER OR WATCHING TELEVISION: NOT AT ALL
10. IF YOU CHECKED OFF ANY PROBLEMS, HOW DIFFICULT HAVE THESE PROBLEMS MADE IT FOR YOU TO DO YOUR WORK, TAKE CARE OF THINGS AT HOME, OR GET ALONG WITH OTHER PEOPLE: SOMEWHAT DIFFICULT
9. THOUGHTS THAT YOU WOULD BE BETTER OFF DEAD, OR OF HURTING YOURSELF: SEVERAL DAYS
SUM OF ALL RESPONSES TO PHQ QUESTIONS 1-9: 10
SUM OF ALL RESPONSES TO PHQ QUESTIONS 1-9: 11
SUM OF ALL RESPONSES TO PHQ QUESTIONS 1-9: 11

## 2024-10-10 NOTE — PROGRESS NOTES
\"Have you been to the ER, urgent care clinic since your last visit?  Hospitalized since your last visit?\"    9-21-24/ Hip Fracture    “Have you seen or consulted any other health care providers outside our system since your last visit?”    [facility]

## 2024-10-10 NOTE — PROGRESS NOTES
SUBJECTIVE:   Mr. Corbin Landeros is a 79 y.o. male who is here for follow up of routine medical issues.  Pt is accompanied by his wife and daughter.     Pt presents today for hospital follow up after falling and fracturing his R. Hip on 09/21/2024-09/27/2024.  He mentions he has lost 9 lbs since being admitted. He state he didn't have much of an appetite after leaving the hospital but states it has been improving gradually. He has been taking 2 tablets tylenol TID for the pain. He was prescribed oxycodone  but was taken off this medication by the orthopedic surgeon. Pt followed up with the surgeon today 10/10/2024 to remove the stiches. Pt will see orthopedic again on 11/07/2024. Pt will begin PT next week at home.  Pt was having constipation due to the oxycodone, but has improved since stopping the opioid.    Pt wife states concerns with his medication. Pt was taking melatonin  pt wife was curious if he will needs it. Pt states he can't sleep now due to the pain. Pt states he sleeps okay. He does nap throughout the day. Pt has BL ankle swelling he states he doesn't have any pain, he was told in the hospital that it may be gout. Pt denies having reflux symptoms he admits to having some belching only after having carbonated drinks.    Pt wife states he will need something for his agitation. Pt mentions he feels down about his health he enjoys being able to do things. He states he knows he will feel better when the pain resolves.  Pt denies any problems with his memory.    At this time, he is otherwise doing well and has brought no other complaints to my attention today.  For a list of the medical issues addressed today, see the assessment and plan below.    PMH:   Past Medical History:   Diagnosis Date    Cancer (HCC) 2012    right lung SCC stage I    Dyspepsia and other specified disorders of function of stomach     Essential hypertension     GERD (gastroesophageal reflux disease)     Hypercholesterolemia

## 2024-10-14 ENCOUNTER — TELEPHONE (OUTPATIENT)
Age: 79
End: 2024-10-14

## 2024-10-14 NOTE — TELEPHONE ENCOUNTER
Loraine, Physical Therapist,   Damian Pereira   Phone [phone]  Fax  [phone]    States:  Needs updated medication list    Please fax        Appointments:  Last seen at [facility]:   10/10/2024   Future appointment [facility]:  Visit date not found         .            Caller confirms readback of documented phone/fax number(s) as correct.

## 2025-02-27 ENCOUNTER — OFFICE VISIT (OUTPATIENT)
Age: 80
End: 2025-02-27
Payer: MEDICARE

## 2025-02-27 VITALS
RESPIRATION RATE: 18 BRPM | HEIGHT: 71 IN | OXYGEN SATURATION: 97 % | SYSTOLIC BLOOD PRESSURE: 142 MMHG | TEMPERATURE: 97.9 F | WEIGHT: 139.4 LBS | BODY MASS INDEX: 19.52 KG/M2 | HEART RATE: 83 BPM | DIASTOLIC BLOOD PRESSURE: 71 MMHG

## 2025-02-27 DIAGNOSIS — C34.12 PRIMARY MALIGNANT NEOPLASM OF BRONCHUS OF LEFT UPPER LOBE (HCC): ICD-10-CM

## 2025-02-27 DIAGNOSIS — E78.00 PURE HYPERCHOLESTEROLEMIA, UNSPECIFIED: ICD-10-CM

## 2025-02-27 DIAGNOSIS — Z00.00 INITIAL MEDICARE ANNUAL WELLNESS VISIT: Primary | ICD-10-CM

## 2025-02-27 DIAGNOSIS — R73.09 ELEVATED HEMOGLOBIN A1C: ICD-10-CM

## 2025-02-27 DIAGNOSIS — Z12.5 PROSTATE CANCER SCREENING: ICD-10-CM

## 2025-02-27 DIAGNOSIS — J43.9 PULMONARY EMPHYSEMA, UNSPECIFIED EMPHYSEMA TYPE (HCC): ICD-10-CM

## 2025-02-27 DIAGNOSIS — I10 PRIMARY HYPERTENSION: ICD-10-CM

## 2025-02-27 DIAGNOSIS — Z13.6 SCREENING FOR CARDIOVASCULAR CONDITION: ICD-10-CM

## 2025-02-27 PROCEDURE — 1159F MED LIST DOCD IN RCRD: CPT | Performed by: FAMILY MEDICINE

## 2025-02-27 PROCEDURE — 3077F SYST BP >= 140 MM HG: CPT | Performed by: FAMILY MEDICINE

## 2025-02-27 PROCEDURE — 1123F ACP DISCUSS/DSCN MKR DOCD: CPT | Performed by: FAMILY MEDICINE

## 2025-02-27 PROCEDURE — 3078F DIAST BP <80 MM HG: CPT | Performed by: FAMILY MEDICINE

## 2025-02-27 PROCEDURE — G0439 PPPS, SUBSEQ VISIT: HCPCS | Performed by: FAMILY MEDICINE

## 2025-02-27 ASSESSMENT — PATIENT HEALTH QUESTIONNAIRE - PHQ9
8. MOVING OR SPEAKING SO SLOWLY THAT OTHER PEOPLE COULD HAVE NOTICED. OR THE OPPOSITE, BEING SO FIGETY OR RESTLESS THAT YOU HAVE BEEN MOVING AROUND A LOT MORE THAN USUAL: NOT AT ALL
1. LITTLE INTEREST OR PLEASURE IN DOING THINGS: NOT AT ALL
9. THOUGHTS THAT YOU WOULD BE BETTER OFF DEAD, OR OF HURTING YOURSELF: NOT AT ALL
SUM OF ALL RESPONSES TO PHQ QUESTIONS 1-9: 1
2. FEELING DOWN, DEPRESSED OR HOPELESS: NOT AT ALL
SUM OF ALL RESPONSES TO PHQ QUESTIONS 1-9: 1
4. FEELING TIRED OR HAVING LITTLE ENERGY: SEVERAL DAYS
SUM OF ALL RESPONSES TO PHQ QUESTIONS 1-9: 1
5. POOR APPETITE OR OVEREATING: NOT AT ALL
7. TROUBLE CONCENTRATING ON THINGS, SUCH AS READING THE NEWSPAPER OR WATCHING TELEVISION: NOT AT ALL
SUM OF ALL RESPONSES TO PHQ9 QUESTIONS 1 & 2: 0
SUM OF ALL RESPONSES TO PHQ QUESTIONS 1-9: 1
3. TROUBLE FALLING OR STAYING ASLEEP: NOT AT ALL
6. FEELING BAD ABOUT YOURSELF - OR THAT YOU ARE A FAILURE OR HAVE LET YOURSELF OR YOUR FAMILY DOWN: NOT AT ALL
10. IF YOU CHECKED OFF ANY PROBLEMS, HOW DIFFICULT HAVE THESE PROBLEMS MADE IT FOR YOU TO DO YOUR WORK, TAKE CARE OF THINGS AT HOME, OR GET ALONG WITH OTHER PEOPLE: SOMEWHAT DIFFICULT

## 2025-02-27 ASSESSMENT — LIFESTYLE VARIABLES
HOW MANY STANDARD DRINKS CONTAINING ALCOHOL DO YOU HAVE ON A TYPICAL DAY: PATIENT DOES NOT DRINK
HOW OFTEN DO YOU HAVE A DRINK CONTAINING ALCOHOL: NEVER

## 2025-02-27 NOTE — PROGRESS NOTES
SUBJECTIVE:   Mr. Corbin Landeros is a 79 y.o. male who is here for follow up of routine medical issues.        Pt states he doesn't have much of an appetite anymore. He states his wife gets him premier protein shake. Pt insists he sleeps well throughout the night.    Pt states his vision is normal. He had his cataracts removed.    He admits after breaking his leg he was constipated but his BM are now back to normal.    Pt seen Dr. Kamran Bruce (pulmonology)  yesterday 02/26/2025. He was advised to begin doing PT exercises again. Pt states he has some anxiety about his cancer coming back.      At this time, he is otherwise doing well and has brought no other complaints to my attention today.  For a list of the medical issues addressed today, see the assessment and plan below.    PMH:   Past Medical History:   Diagnosis Date    Cancer (HCC) 2012    right lung SCC stage I    Dyspepsia and other specified disorders of function of stomach     Essential hypertension     GERD (gastroesophageal reflux disease)     Hypercholesterolemia     Hyperlipidemia     Prediabetes 8/26/2015    Pseudogout 8/6/2012    Squamous cell carcinoma lung (HCC)      PSH:  has a past surgical history that includes colonoscopy,remnicholas carrillo,snare (2/8/2018); hernia repair (2011); other surgical history (11/13/12); other surgical history (10 26 12); Chest surgery; Colonoscopy (N/A, 2/8/2018); hernia repair (5/20/13); and Femur Surgery (Right, 9/22/2024).    All: is allergic to lisinopril.   MEDS:   Current Outpatient Medications   Medication Sig    melatonin 3 MG TABS tablet Take 1 tablet by mouth nightly    atorvastatin (LIPITOR) 10 MG tablet TAKE 1 TABLET EVERY NIGHT    amLODIPine (NORVASC) 10 MG tablet TAKE 1 TABLET EVERY DAY    acetaminophen (TYLENOL) 500 MG tablet Take 2 tablets by mouth every 6 hours as needed    aspirin 81 MG chewable tablet Take 1 tablet by mouth daily    budesonide-formoterol (SYMBICORT) 160-4.5 MCG/ACT AERO INHALE 1 PUFF TWICE 
\"Have you been to the ER, urgent care clinic since your last visit?  Hospitalized since your last visit?\"    NO    “Have you seen or consulted any other health care providers outside our system since your last visit?”    NO           
6.4 oz)   Height: 1.803 m (5' 11\")      Body mass index is 19.44 kg/m².                    Allergies   Allergen Reactions    Lisinopril Angioedema     Entire face swells     Prior to Visit Medications    Medication Sig Taking? Authorizing Provider   melatonin 3 MG TABS tablet Take 1 tablet by mouth nightly Yes Magda Wright MD   atorvastatin (LIPITOR) 10 MG tablet TAKE 1 TABLET EVERY NIGHT Yes Saniya So MD   amLODIPine (NORVASC) 10 MG tablet TAKE 1 TABLET EVERY DAY Yes Saniya So MD   acetaminophen (TYLENOL) 500 MG tablet Take 2 tablets by mouth every 6 hours as needed Yes Automatic Reconciliation, Ar   aspirin 81 MG chewable tablet Take 1 tablet by mouth daily Yes Automatic Reconciliation, Ar   budesonide-formoterol (SYMBICORT) 160-4.5 MCG/ACT AERO INHALE 1 PUFF TWICE DAILY Yes Automatic Reconciliation, Ar   cyanocobalamin 1000 MCG tablet Take 2.5 tablets by mouth daily Yes Automatic Reconciliation, Ar   tiZANidine (ZANAFLEX) 2 MG tablet Take 1 tablet by mouth daily As needed Yes Automatic Reconciliation, Ar   colchicine (COLCRYS) 0.6 MG tablet Take 1 tablet by mouth daily for 5 days  Patient not taking: Reported on 2/27/2025  Magda Wright MD   omeprazole (PRILOSEC) 20 MG delayed release capsule TAKE 1 CAPSULE EVERY MORNING BEFORE BREAKFAST  Patient not taking: Reported on 10/10/2024  Joie Bhagat MD       [facility] (Including outside providers/suppliers regularly involved in providing care):   Patient Care Team:  Saniya So MD as PCP - General  Saniya So MD as PCP - Empaneled Provider     Recommendations for Preventive Services Due: see orders and patient instructions/AVS.  Recommended screening schedule for the next 5-10 years is provided to the patient in written form: see Patient Instructions/AVS.     Reviewed and updated this visit:  Allergies  Meds  Med Hx  Sexual Hx

## 2025-03-04 ENCOUNTER — TRANSCRIBE ORDERS (OUTPATIENT)
Facility: HOSPITAL | Age: 80
End: 2025-03-04

## 2025-03-04 DIAGNOSIS — C34.12 PRIMARY CANCER OF LEFT UPPER LOBE OF LUNG (HCC): Primary | ICD-10-CM

## 2025-03-04 LAB
BASOPHILS # BLD AUTO: 0 X10E3/UL (ref 0–0.2)
BASOPHILS NFR BLD AUTO: 1 %
EOSINOPHIL # BLD AUTO: 0.1 X10E3/UL (ref 0–0.4)
EOSINOPHIL NFR BLD AUTO: 3 %
ERYTHROCYTE [DISTWIDTH] IN BLOOD BY AUTOMATED COUNT: 13.6 % (ref 11.6–15.4)
HBA1C MFR BLD: 6.4 % (ref 4.8–5.6)
HCT VFR BLD AUTO: 42.2 % (ref 37.5–51)
HGB BLD-MCNC: 13.7 G/DL (ref 13–17.7)
IMM GRANULOCYTES # BLD AUTO: 0 X10E3/UL (ref 0–0.1)
IMM GRANULOCYTES NFR BLD AUTO: 0 %
LYMPHOCYTES # BLD AUTO: 1 X10E3/UL (ref 0.7–3.1)
LYMPHOCYTES NFR BLD AUTO: 27 %
MCH RBC QN AUTO: 31.7 PG (ref 26.6–33)
MCHC RBC AUTO-ENTMCNC: 32.5 G/DL (ref 31.5–35.7)
MCV RBC AUTO: 98 FL (ref 79–97)
MONOCYTES # BLD AUTO: 0.5 X10E3/UL (ref 0.1–0.9)
MONOCYTES NFR BLD AUTO: 13 %
NEUTROPHILS # BLD AUTO: 2.1 X10E3/UL (ref 1.4–7)
NEUTROPHILS NFR BLD AUTO: 56 %
PLATELET # BLD AUTO: 341 X10E3/UL (ref 150–450)
RBC # BLD AUTO: 4.32 X10E6/UL (ref 4.14–5.8)
WBC # BLD AUTO: 3.8 X10E3/UL (ref 3.4–10.8)

## 2025-03-05 LAB
ALBUMIN SERPL-MCNC: 4.5 G/DL (ref 3.8–4.8)
ALP SERPL-CCNC: 15 IU/L (ref 44–121)
ALT SERPL-CCNC: 12 IU/L (ref 0–44)
AST SERPL-CCNC: 20 IU/L (ref 0–40)
BILIRUB SERPL-MCNC: 0.7 MG/DL (ref 0–1.2)
BUN SERPL-MCNC: 11 MG/DL (ref 8–27)
BUN/CREAT SERPL: 13 (ref 10–24)
CALCIUM SERPL-MCNC: 9.5 MG/DL (ref 8.6–10.2)
CHLORIDE SERPL-SCNC: 102 MMOL/L (ref 96–106)
CHOLEST SERPL-MCNC: 169 MG/DL (ref 100–199)
CO2 SERPL-SCNC: 24 MMOL/L (ref 20–29)
CREAT SERPL-MCNC: 0.85 MG/DL (ref 0.76–1.27)
EGFRCR SERPLBLD CKD-EPI 2021: 88 ML/MIN/1.73
GLOBULIN SER CALC-MCNC: 2.5 G/DL (ref 1.5–4.5)
GLUCOSE SERPL-MCNC: 107 MG/DL (ref 70–99)
HDLC SERPL-MCNC: 39 MG/DL
LDLC SERPL CALC-MCNC: 112 MG/DL (ref 0–99)
POTASSIUM SERPL-SCNC: 4.1 MMOL/L (ref 3.5–5.2)
PROT SERPL-MCNC: 7 G/DL (ref 6–8.5)
PSA SERPL-MCNC: 1.4 NG/ML (ref 0–4)
SODIUM SERPL-SCNC: 140 MMOL/L (ref 134–144)
TRIGL SERPL-MCNC: 96 MG/DL (ref 0–149)
VLDLC SERPL CALC-MCNC: 18 MG/DL (ref 5–40)

## 2025-03-21 ENCOUNTER — RESULTS FOLLOW-UP (OUTPATIENT)
Age: 80
End: 2025-03-21

## 2025-05-12 RX ORDER — AMLODIPINE BESYLATE 10 MG/1
10 TABLET ORAL DAILY
Qty: 90 TABLET | Refills: 3 | Status: SHIPPED | OUTPATIENT
Start: 2025-05-12

## 2025-06-11 ENCOUNTER — APPOINTMENT (OUTPATIENT)
Facility: HOSPITAL | Age: 80
End: 2025-06-11
Payer: MEDICARE

## 2025-06-11 ENCOUNTER — HOSPITAL ENCOUNTER (EMERGENCY)
Facility: HOSPITAL | Age: 80
Discharge: HOME | End: 2025-06-12
Attending: STUDENT IN AN ORGANIZED HEALTH CARE EDUCATION/TRAINING PROGRAM
Payer: MEDICARE

## 2025-06-11 DIAGNOSIS — I49.3 FREQUENT PVCS: ICD-10-CM

## 2025-06-11 DIAGNOSIS — R07.9 CHEST PAIN, UNSPECIFIED TYPE: Primary | ICD-10-CM

## 2025-06-11 LAB
ALBUMIN SERPL-MCNC: 4 G/DL (ref 3.5–5)
ALBUMIN/GLOB SERPL: 1.1 (ref 1.1–2.2)
ALP SERPL-CCNC: 12 U/L (ref 45–117)
ALT SERPL-CCNC: 20 U/L (ref 12–78)
ANION GAP SERPL CALC-SCNC: 5 MMOL/L (ref 2–12)
AST SERPL-CCNC: ABNORMAL U/L (ref 15–37)
BASOPHILS # BLD: 0.02 K/UL (ref 0–0.1)
BASOPHILS NFR BLD: 0.3 % (ref 0–1)
BILIRUB SERPL-MCNC: 0.7 MG/DL (ref 0.2–1)
BUN SERPL-MCNC: 12 MG/DL (ref 6–20)
BUN/CREAT SERPL: 12 (ref 12–20)
CALCIUM SERPL-MCNC: 9.8 MG/DL (ref 8.5–10.1)
CHLORIDE SERPL-SCNC: 103 MMOL/L (ref 97–108)
CO2 SERPL-SCNC: 29 MMOL/L (ref 21–32)
CREAT SERPL-MCNC: 0.97 MG/DL (ref 0.7–1.3)
DIFFERENTIAL METHOD BLD: ABNORMAL
EKG ATRIAL RATE: 76 BPM
EKG DIAGNOSIS: NORMAL
EKG P AXIS: 71 DEGREES
EKG P-R INTERVAL: 172 MS
EKG Q-T INTERVAL: 354 MS
EKG QRS DURATION: 90 MS
EKG QTC CALCULATION (BAZETT): 398 MS
EKG R AXIS: 22 DEGREES
EKG T AXIS: 75 DEGREES
EKG VENTRICULAR RATE: 76 BPM
EOSINOPHIL # BLD: 0.01 K/UL (ref 0–0.4)
EOSINOPHIL NFR BLD: 0.2 % (ref 0–7)
ERYTHROCYTE [DISTWIDTH] IN BLOOD BY AUTOMATED COUNT: 13.1 % (ref 11.5–14.5)
GLOBULIN SER CALC-MCNC: 3.6 G/DL (ref 2–4)
GLUCOSE SERPL-MCNC: 139 MG/DL (ref 65–100)
HCT VFR BLD AUTO: 43.3 % (ref 36.6–50.3)
HGB BLD-MCNC: 14.4 G/DL (ref 12.1–17)
IMM GRANULOCYTES # BLD AUTO: 0.02 K/UL (ref 0–0.04)
IMM GRANULOCYTES NFR BLD AUTO: 0.3 % (ref 0–0.5)
LYMPHOCYTES # BLD: 0.89 K/UL (ref 0.8–3.5)
LYMPHOCYTES NFR BLD: 14.8 % (ref 12–49)
MCH RBC QN AUTO: 31.8 PG (ref 26–34)
MCHC RBC AUTO-ENTMCNC: 33.3 G/DL (ref 30–36.5)
MCV RBC AUTO: 95.6 FL (ref 80–99)
MONOCYTES # BLD: 0.46 K/UL (ref 0–1)
MONOCYTES NFR BLD: 7.6 % (ref 5–13)
NEUTS SEG # BLD: 4.63 K/UL (ref 1.8–8)
NEUTS SEG NFR BLD: 76.8 % (ref 32–75)
NRBC # BLD: 0 K/UL (ref 0–0.01)
NRBC BLD-RTO: 0 PER 100 WBC
PLATELET # BLD AUTO: 333 K/UL (ref 150–400)
PMV BLD AUTO: 9.3 FL (ref 8.9–12.9)
POTASSIUM SERPL-SCNC: ABNORMAL MMOL/L (ref 3.5–5.1)
PROT SERPL-MCNC: 7.6 G/DL (ref 6.4–8.2)
RBC # BLD AUTO: 4.53 M/UL (ref 4.1–5.7)
SODIUM SERPL-SCNC: 137 MMOL/L (ref 136–145)
TROPONIN I SERPL HS-MCNC: 6 NG/L (ref 0–76)
TROPONIN I SERPL HS-MCNC: 7 NG/L (ref 0–76)
TROPONIN I SERPL HS-MCNC: 9 NG/L (ref 0–76)
WBC # BLD AUTO: 6 K/UL (ref 4.1–11.1)

## 2025-06-11 PROCEDURE — 93005 ELECTROCARDIOGRAM TRACING: CPT | Performed by: STUDENT IN AN ORGANIZED HEALTH CARE EDUCATION/TRAINING PROGRAM

## 2025-06-11 PROCEDURE — 80053 COMPREHEN METABOLIC PANEL: CPT

## 2025-06-11 PROCEDURE — 6370000000 HC RX 637 (ALT 250 FOR IP): Performed by: STUDENT IN AN ORGANIZED HEALTH CARE EDUCATION/TRAINING PROGRAM

## 2025-06-11 PROCEDURE — 71275 CT ANGIOGRAPHY CHEST: CPT

## 2025-06-11 PROCEDURE — 99285 EMERGENCY DEPT VISIT HI MDM: CPT

## 2025-06-11 PROCEDURE — 6360000004 HC RX CONTRAST MEDICATION: Performed by: STUDENT IN AN ORGANIZED HEALTH CARE EDUCATION/TRAINING PROGRAM

## 2025-06-11 PROCEDURE — 96374 THER/PROPH/DIAG INJ IV PUSH: CPT

## 2025-06-11 PROCEDURE — 2500000003 HC RX 250 WO HCPCS: Performed by: STUDENT IN AN ORGANIZED HEALTH CARE EDUCATION/TRAINING PROGRAM

## 2025-06-11 PROCEDURE — 84484 ASSAY OF TROPONIN QUANT: CPT

## 2025-06-11 PROCEDURE — 6360000002 HC RX W HCPCS: Performed by: STUDENT IN AN ORGANIZED HEALTH CARE EDUCATION/TRAINING PROGRAM

## 2025-06-11 PROCEDURE — 36415 COLL VENOUS BLD VENIPUNCTURE: CPT

## 2025-06-11 PROCEDURE — 85025 COMPLETE CBC W/AUTO DIFF WBC: CPT

## 2025-06-11 RX ORDER — ASPIRIN 81 MG/1
324 TABLET, CHEWABLE ORAL ONCE
Status: COMPLETED | OUTPATIENT
Start: 2025-06-11 | End: 2025-06-11

## 2025-06-11 RX ORDER — IOPAMIDOL 755 MG/ML
100 INJECTION, SOLUTION INTRAVASCULAR
Status: COMPLETED | OUTPATIENT
Start: 2025-06-11 | End: 2025-06-11

## 2025-06-11 RX ORDER — FAMOTIDINE 20 MG/1
20 TABLET, FILM COATED ORAL DAILY
Qty: 30 TABLET | Refills: 0 | Status: SHIPPED | OUTPATIENT
Start: 2025-06-11 | End: 2025-06-16 | Stop reason: SDUPTHER

## 2025-06-11 RX ORDER — LIDOCAINE HYDROCHLORIDE 20 MG/ML
15 SOLUTION OROPHARYNGEAL EVERY 8 HOURS PRN
Qty: 100 ML | Refills: 0 | Status: SHIPPED | OUTPATIENT
Start: 2025-06-11 | End: 2025-06-16

## 2025-06-11 RX ADMIN — LIDOCAINE HYDROCHLORIDE 40 ML: 20 SOLUTION ORAL at 21:22

## 2025-06-11 RX ADMIN — ASPIRIN 324 MG: 81 TABLET, CHEWABLE ORAL at 21:22

## 2025-06-11 RX ADMIN — IOPAMIDOL 65 ML: 755 INJECTION, SOLUTION INTRAVENOUS at 20:52

## 2025-06-11 RX ADMIN — FAMOTIDINE 20 MG: 10 INJECTION, SOLUTION INTRAVENOUS at 21:22

## 2025-06-11 ASSESSMENT — PAIN - FUNCTIONAL ASSESSMENT
PAIN_FUNCTIONAL_ASSESSMENT: 0-10
PAIN_FUNCTIONAL_ASSESSMENT: ACTIVITIES ARE NOT PREVENTED

## 2025-06-11 ASSESSMENT — PAIN SCALES - GENERAL
PAINLEVEL_OUTOF10: 5
PAINLEVEL_OUTOF10: 2

## 2025-06-11 ASSESSMENT — PAIN DESCRIPTION - ORIENTATION
ORIENTATION: MID
ORIENTATION: MID

## 2025-06-11 ASSESSMENT — PAIN DESCRIPTION - DESCRIPTORS
DESCRIPTORS: BURNING;SORE
DESCRIPTORS: BURNING;SORE

## 2025-06-11 ASSESSMENT — PAIN DESCRIPTION - LOCATION
LOCATION: CHEST
LOCATION: CHEST

## 2025-06-12 ENCOUNTER — TELEPHONE (OUTPATIENT)
Facility: HOSPITAL | Age: 80
End: 2025-06-12

## 2025-06-12 ENCOUNTER — TELEPHONE (OUTPATIENT)
Age: 80
End: 2025-06-12

## 2025-06-12 VITALS
SYSTOLIC BLOOD PRESSURE: 148 MMHG | TEMPERATURE: 97.7 F | OXYGEN SATURATION: 95 % | HEIGHT: 71 IN | RESPIRATION RATE: 22 BRPM | BODY MASS INDEX: 19.54 KG/M2 | WEIGHT: 139.55 LBS | HEART RATE: 92 BPM | DIASTOLIC BLOOD PRESSURE: 100 MMHG

## 2025-06-12 LAB
EKG ATRIAL RATE: 81 BPM
EKG DIAGNOSIS: NORMAL
EKG P AXIS: 65 DEGREES
EKG P-R INTERVAL: 206 MS
EKG Q-T INTERVAL: 376 MS
EKG QRS DURATION: 84 MS
EKG QTC CALCULATION (BAZETT): 436 MS
EKG R AXIS: 12 DEGREES
EKG T AXIS: 68 DEGREES
EKG VENTRICULAR RATE: 81 BPM

## 2025-06-12 NOTE — ED PROVIDER NOTES
[facility] EMERGENCY DEPARTMENT  EMERGENCY DEPARTMENT ENCOUNTER       Pt Name: Corbin Landeros  MRN: [id number]  Birthdate 1945  Date of evaluation: 6/11/2025  Provider: Osman Fontanez MD   PCP: Saniya So MD  Note Started: 9:01 PM EDT 6/11/25     CHIEF COMPLAINT       Chief Complaint   Patient presents with    Chest Pain     Patient ambulatory into triage c/o burning middle chest pain, headache x3 days. Pt denies cardiac hx. Denies abdominal pain, no N/V/D.      HISTORY OF PRESENT ILLNESS: 1 or more elements      History From: patient, History limited by: none     Corbin Landeros is a 80 y.o. male with history of lung cancer in remission, HTN, HLD, GERD who presents to the emergency department with chest discomfort that he describes as a soreness/burning that has been ongoing for the past few days.  He reports that this is worse in the morning when he wakes up, but he does have intermittent pain throughout the day.  Does not seem to be significantly worsened by exertion and in fact he notices improvement with activity.  He states that he does feel fatigued which is unusual for him.  No recent fevers or chills.  No sick contacts.  He has not had coughing or shortness of breath.  Reported headache in triage, but did not endorse this on my evaluation.    He has had negative stress tests in the past, but none recently.  He does not see a cardiologist.    Please See MDM for Additional Details of the HPI/PMH  Nursing Notes were all reviewed and agreed with or any disagreements were addressed in the HPI.     REVIEW OF SYSTEMS      Positives and Pertinent negatives as per HPI.    PAST HISTORY     Past Medical History:  Past Medical History:   Diagnosis Date    Cancer (HCC) 2012    right lung SCC stage I    Dyspepsia and other specified disorders of function of stomach     Essential hypertension     GERD (gastroesophageal reflux disease)     Hypercholesterolemia     Hyperlipidemia     Prediabetes 8/26/2015

## 2025-06-12 NOTE — TELEPHONE ENCOUNTER
[facility] pt was seen in ED on 06/11/25 and needs to be seen by pcp as soon as possible. [facility] wanted to admit pt to conduct testing but pt refused.     Please call to schedule.

## 2025-06-12 NOTE — DISCHARGE INSTRUCTIONS
Please make a followup with your primary care physician and a cardiolgosit.  If you have any new or worsening concerning medical symptoms please return to the emergency department.

## 2025-06-16 ENCOUNTER — OFFICE VISIT (OUTPATIENT)
Age: 80
End: 2025-06-16
Payer: MEDICARE

## 2025-06-16 VITALS
SYSTOLIC BLOOD PRESSURE: 138 MMHG | TEMPERATURE: 98 F | HEIGHT: 71 IN | HEART RATE: 83 BPM | BODY MASS INDEX: 19.54 KG/M2 | WEIGHT: 139.6 LBS | DIASTOLIC BLOOD PRESSURE: 69 MMHG | OXYGEN SATURATION: 99 % | RESPIRATION RATE: 20 BRPM

## 2025-06-16 DIAGNOSIS — R41.3 MEMORY CHANGES: ICD-10-CM

## 2025-06-16 DIAGNOSIS — I10 PRIMARY HYPERTENSION: ICD-10-CM

## 2025-06-16 DIAGNOSIS — R73.09 ELEVATED HEMOGLOBIN A1C: ICD-10-CM

## 2025-06-16 DIAGNOSIS — R73.09 ELEVATED HEMOGLOBIN A1C: Primary | ICD-10-CM

## 2025-06-16 DIAGNOSIS — K21.9 GASTRO-ESOPHAGEAL REFLUX DISEASE WITHOUT ESOPHAGITIS: ICD-10-CM

## 2025-06-16 PROCEDURE — 1159F MED LIST DOCD IN RCRD: CPT | Performed by: FAMILY MEDICINE

## 2025-06-16 PROCEDURE — 1123F ACP DISCUSS/DSCN MKR DOCD: CPT | Performed by: FAMILY MEDICINE

## 2025-06-16 PROCEDURE — 3075F SYST BP GE 130 - 139MM HG: CPT | Performed by: FAMILY MEDICINE

## 2025-06-16 PROCEDURE — G8420 CALC BMI NORM PARAMETERS: HCPCS | Performed by: FAMILY MEDICINE

## 2025-06-16 PROCEDURE — 1036F TOBACCO NON-USER: CPT | Performed by: FAMILY MEDICINE

## 2025-06-16 PROCEDURE — 3078F DIAST BP <80 MM HG: CPT | Performed by: FAMILY MEDICINE

## 2025-06-16 PROCEDURE — 99215 OFFICE O/P EST HI 40 MIN: CPT | Performed by: FAMILY MEDICINE

## 2025-06-16 PROCEDURE — 1160F RVW MEDS BY RX/DR IN RCRD: CPT | Performed by: FAMILY MEDICINE

## 2025-06-16 PROCEDURE — G8427 DOCREV CUR MEDS BY ELIG CLIN: HCPCS | Performed by: FAMILY MEDICINE

## 2025-06-16 RX ORDER — FAMOTIDINE 20 MG/1
20 TABLET, FILM COATED ORAL DAILY
Qty: 30 TABLET | Refills: 2 | Status: SHIPPED | OUTPATIENT
Start: 2025-06-16 | End: 2025-07-16

## 2025-06-16 SDOH — ECONOMIC STABILITY: FOOD INSECURITY: WITHIN THE PAST 12 MONTHS, THE FOOD YOU BOUGHT JUST DIDN'T LAST AND YOU DIDN'T HAVE MONEY TO GET MORE.: NEVER TRUE

## 2025-06-16 SDOH — ECONOMIC STABILITY: FOOD INSECURITY: WITHIN THE PAST 12 MONTHS, YOU WORRIED THAT YOUR FOOD WOULD RUN OUT BEFORE YOU GOT MONEY TO BUY MORE.: NEVER TRUE

## 2025-06-16 NOTE — PROGRESS NOTES
Have you been to the ER, urgent care clinic since your last visit?  Hospitalized since your last visit?   ED/ Chest soreness    Have you seen or consulted any other health care providers outside our system since your last visit?   NO

## 2025-06-17 ENCOUNTER — TELEPHONE (OUTPATIENT)
Age: 80
End: 2025-06-17

## 2025-06-17 ENCOUNTER — RESULTS FOLLOW-UP (OUTPATIENT)
Age: 80
End: 2025-06-17

## 2025-06-17 DIAGNOSIS — R73.03 PREDIABETES: Primary | ICD-10-CM

## 2025-06-17 LAB
EST. AVERAGE GLUCOSE BLD GHB EST-MCNC: 126 MG/DL
HBA1C MFR BLD: 6 % (ref 4–5.6)

## 2025-06-17 NOTE — TELEPHONE ENCOUNTER
Patient's spouse states that the patient is not scheduled for neuropsychology until January of 2026.   Patient is scheduled with gastro in August.  Patient wife unsure if they should wait until January and August to be seen.    Please call patient to advise.

## 2025-06-18 NOTE — TELEPHONE ENCOUNTER
----- Message from Dr. Saniya So MD sent at 6/17/2025 10:28 PM EDT -----  Your current hgbA1c of 6.0 is lower than your last level of 6.4.  Keep up the good work!  Continue to work on following a diabetic diet and exercise.  Recheck this test: hgbA1c  in  3 months.

## 2025-06-19 NOTE — TELEPHONE ENCOUNTER
Wife is calling and states that she can't get a call back and needs that done.    Pt can not wait this long to be seen.  What can we do?    Please call Sasha and let her know.

## 2025-07-01 NOTE — TELEPHONE ENCOUNTER
I spoke to the patient's wife, I let her know she can call other neurology office and/or place his name on the cancellation list. Patient's wife verbalized understanding of information discussed w/ no further questions at this time.

## 2025-07-02 DIAGNOSIS — K21.9 GASTRO-ESOPHAGEAL REFLUX DISEASE WITHOUT ESOPHAGITIS: ICD-10-CM

## 2025-07-03 RX ORDER — FAMOTIDINE 20 MG/1
20 TABLET, FILM COATED ORAL DAILY
Qty: 90 TABLET | Refills: 0 | Status: SHIPPED | OUTPATIENT
Start: 2025-07-03

## 2025-08-11 ENCOUNTER — TELEPHONE (OUTPATIENT)
Age: 80
End: 2025-08-11

## 2025-08-15 ENCOUNTER — APPOINTMENT (OUTPATIENT)
Facility: HOSPITAL | Age: 80
End: 2025-08-15
Payer: MEDICARE

## 2025-08-15 ENCOUNTER — HOSPITAL ENCOUNTER (EMERGENCY)
Facility: HOSPITAL | Age: 80
Discharge: HOME | End: 2025-08-15
Attending: EMERGENCY MEDICINE
Payer: MEDICARE

## 2025-08-15 VITALS
RESPIRATION RATE: 24 BRPM | TEMPERATURE: 97.9 F | OXYGEN SATURATION: 97 % | DIASTOLIC BLOOD PRESSURE: 77 MMHG | HEART RATE: 81 BPM | SYSTOLIC BLOOD PRESSURE: 137 MMHG

## 2025-08-15 DIAGNOSIS — R53.1 GENERALIZED WEAKNESS: Primary | ICD-10-CM

## 2025-08-15 LAB
ALBUMIN SERPL-MCNC: 4.1 G/DL (ref 3.5–5.2)
ALBUMIN/GLOB SERPL: 1.3 (ref 1.1–2.2)
ALP SERPL-CCNC: 11 U/L (ref 40–129)
ALT SERPL-CCNC: 10 U/L (ref 10–50)
ANION GAP SERPL CALC-SCNC: 12 MMOL/L (ref 2–14)
APPEARANCE UR: CLEAR
AST SERPL-CCNC: 20 U/L (ref 10–50)
BACTERIA URNS QL MICRO: NEGATIVE /HPF
BASOPHILS # BLD: 0.01 K/UL (ref 0–0.1)
BASOPHILS NFR BLD: 0.2 % (ref 0–1)
BILIRUB SERPL-MCNC: 0.9 MG/DL (ref 0–1.2)
BILIRUB UR QL: NEGATIVE
BUN SERPL-MCNC: 8 MG/DL (ref 8–23)
BUN/CREAT SERPL: 10 (ref 12–20)
CALCIUM SERPL-MCNC: 9.7 MG/DL (ref 8.8–10.2)
CHLORIDE SERPL-SCNC: 102 MMOL/L (ref 98–107)
CO2 SERPL-SCNC: 25 MMOL/L (ref 20–29)
COLOR UR: NORMAL
CREAT SERPL-MCNC: 0.84 MG/DL (ref 0.7–1.2)
DIFFERENTIAL METHOD BLD: ABNORMAL
EKG ATRIAL RATE: 84 BPM
EKG DIAGNOSIS: NORMAL
EKG P AXIS: 62 DEGREES
EKG P-R INTERVAL: 172 MS
EKG Q-T INTERVAL: 362 MS
EKG QRS DURATION: 88 MS
EKG QTC CALCULATION (BAZETT): 427 MS
EKG R AXIS: 35 DEGREES
EKG T AXIS: 69 DEGREES
EKG VENTRICULAR RATE: 84 BPM
EOSINOPHIL # BLD: 0.02 K/UL (ref 0–0.4)
EOSINOPHIL NFR BLD: 0.5 % (ref 0–7)
EPITH CASTS URNS QL MICRO: NORMAL /LPF
ERYTHROCYTE [DISTWIDTH] IN BLOOD BY AUTOMATED COUNT: 13.2 % (ref 11.5–14.5)
FLUAV RNA SPEC QL NAA+PROBE: NOT DETECTED
FLUBV RNA SPEC QL NAA+PROBE: NOT DETECTED
GLOBULIN SER CALC-MCNC: 3.1 G/DL (ref 2–4)
GLUCOSE SERPL-MCNC: 121 MG/DL (ref 65–100)
GLUCOSE UR STRIP.AUTO-MCNC: NEGATIVE MG/DL
HCT VFR BLD AUTO: 42.9 % (ref 36.6–50.3)
HGB BLD-MCNC: 14.3 G/DL (ref 12.1–17)
HGB UR QL STRIP: NEGATIVE
IMM GRANULOCYTES # BLD AUTO: 0.02 K/UL (ref 0–0.04)
IMM GRANULOCYTES NFR BLD AUTO: 0.5 % (ref 0–0.5)
KETONES UR QL STRIP.AUTO: NEGATIVE MG/DL
LEUKOCYTE ESTERASE UR QL STRIP.AUTO: NEGATIVE
LYMPHOCYTES # BLD: 0.77 K/UL (ref 0.8–3.5)
LYMPHOCYTES NFR BLD: 17.6 % (ref 12–49)
MAGNESIUM SERPL-MCNC: 1.9 MG/DL (ref 1.6–2.4)
MCH RBC QN AUTO: 32 PG (ref 26–34)
MCHC RBC AUTO-ENTMCNC: 33.3 G/DL (ref 30–36.5)
MCV RBC AUTO: 96 FL (ref 80–99)
MONOCYTES # BLD: 0.45 K/UL (ref 0–1)
MONOCYTES NFR BLD: 10.2 % (ref 5–13)
NEUTS SEG # BLD: 3.12 K/UL (ref 1.8–8)
NEUTS SEG NFR BLD: 71 % (ref 32–75)
NITRITE UR QL STRIP.AUTO: NEGATIVE
NRBC # BLD: 0 K/UL (ref 0–0.01)
NRBC BLD-RTO: 0 PER 100 WBC
PH UR STRIP: 7.5 (ref 5–8)
PLATELET # BLD AUTO: 325 K/UL (ref 150–400)
PMV BLD AUTO: 8.8 FL (ref 8.9–12.9)
POTASSIUM SERPL-SCNC: 3.9 MMOL/L (ref 3.5–5.1)
PROT SERPL-MCNC: 7.2 G/DL (ref 6.4–8.3)
PROT UR STRIP-MCNC: NEGATIVE MG/DL
RBC # BLD AUTO: 4.47 M/UL (ref 4.1–5.7)
RBC #/AREA URNS HPF: NORMAL /HPF (ref 0–5)
RBC MORPH BLD: ABNORMAL
SARS-COV-2 RNA RESP QL NAA+PROBE: NOT DETECTED
SODIUM SERPL-SCNC: 139 MMOL/L (ref 136–145)
SOURCE: NORMAL
SP GR UR REFRACTOMETRY: 1.01
T4 FREE SERPL-MCNC: 1.2 NG/DL (ref 0.9–1.6)
TROPONIN T SERPL HS-MCNC: 16.8 NG/L (ref 0–22)
TSH, 3RD GENERATION: 0.73 UIU/ML (ref 0.27–4.2)
URINE CULTURE IF INDICATED: NORMAL
UROBILINOGEN UR QL STRIP.AUTO: 1 EU/DL (ref 0.2–1)
WBC # BLD AUTO: 4.4 K/UL (ref 4.1–11.1)
WBC URNS QL MICRO: NORMAL /HPF (ref 0–4)

## 2025-08-15 PROCEDURE — 81001 URINALYSIS AUTO W/SCOPE: CPT

## 2025-08-15 PROCEDURE — 85025 COMPLETE CBC W/AUTO DIFF WBC: CPT

## 2025-08-15 PROCEDURE — 93005 ELECTROCARDIOGRAM TRACING: CPT | Performed by: EMERGENCY MEDICINE

## 2025-08-15 PROCEDURE — 87636 SARSCOV2 & INF A&B AMP PRB: CPT

## 2025-08-15 PROCEDURE — 74177 CT ABD & PELVIS W/CONTRAST: CPT

## 2025-08-15 PROCEDURE — 70450 CT HEAD/BRAIN W/O DYE: CPT

## 2025-08-15 PROCEDURE — 6360000004 HC RX CONTRAST MEDICATION: Performed by: EMERGENCY MEDICINE

## 2025-08-15 PROCEDURE — 84443 ASSAY THYROID STIM HORMONE: CPT

## 2025-08-15 PROCEDURE — 83735 ASSAY OF MAGNESIUM: CPT

## 2025-08-15 PROCEDURE — 84439 ASSAY OF FREE THYROXINE: CPT

## 2025-08-15 PROCEDURE — 71045 X-RAY EXAM CHEST 1 VIEW: CPT

## 2025-08-15 PROCEDURE — 99285 EMERGENCY DEPT VISIT HI MDM: CPT

## 2025-08-15 PROCEDURE — 80053 COMPREHEN METABOLIC PANEL: CPT

## 2025-08-15 PROCEDURE — 36415 COLL VENOUS BLD VENIPUNCTURE: CPT

## 2025-08-15 PROCEDURE — 84484 ASSAY OF TROPONIN QUANT: CPT

## 2025-08-15 RX ORDER — IOPAMIDOL 755 MG/ML
100 INJECTION, SOLUTION INTRAVASCULAR
Status: COMPLETED | OUTPATIENT
Start: 2025-08-15 | End: 2025-08-15

## 2025-08-15 RX ADMIN — IOPAMIDOL 100 ML: 755 INJECTION, SOLUTION INTRAVENOUS at 12:25

## 2025-08-15 ASSESSMENT — LIFESTYLE VARIABLES
HOW OFTEN DO YOU HAVE A DRINK CONTAINING ALCOHOL: NEVER
HOW MANY STANDARD DRINKS CONTAINING ALCOHOL DO YOU HAVE ON A TYPICAL DAY: PATIENT DOES NOT DRINK

## 2025-08-15 ASSESSMENT — PAIN SCALES - GENERAL: PAINLEVEL_OUTOF10: 0

## 2025-08-19 ENCOUNTER — TELEPHONE (OUTPATIENT)
Age: 80
End: 2025-08-19

## 2025-08-20 ENCOUNTER — HOSPITAL ENCOUNTER (OUTPATIENT)
Facility: HOSPITAL | Age: 80
Discharge: HOME | End: 2025-08-23
Attending: INTERNAL MEDICINE
Payer: MEDICARE

## 2025-08-20 DIAGNOSIS — C34.12 PRIMARY CANCER OF LEFT UPPER LOBE OF LUNG (HCC): ICD-10-CM

## 2025-08-20 PROCEDURE — 71250 CT THORAX DX C-: CPT

## 2025-08-23 DIAGNOSIS — E78.00 PURE HYPERCHOLESTEROLEMIA, UNSPECIFIED: ICD-10-CM

## 2025-08-26 RX ORDER — ATORVASTATIN CALCIUM 10 MG/1
10 TABLET, FILM COATED ORAL NIGHTLY
Qty: 90 TABLET | Refills: 3 | Status: SHIPPED | OUTPATIENT
Start: 2025-08-26

## 2025-08-29 ENCOUNTER — TELEPHONE (OUTPATIENT)
Age: 80
End: 2025-08-29

## (undated) DEVICE — HIGH FLOW RATE EXTENSION SET, LUER LOCK ADAPTERS

## (undated) DEVICE — FORCEPS BX L240CM JAW DIA2.8MM L CAP W/ NDL MIC MESH TOOTH

## (undated) DEVICE — SYR 3ML LL TIP 1/10ML GRAD --

## (undated) DEVICE — C-ARM: Brand: UNBRANDED

## (undated) DEVICE — Device

## (undated) DEVICE — MAJOR LAPAROTOMY-MRMC: Brand: MEDLINE INDUSTRIES, INC.

## (undated) DEVICE — 3M™ TEGADERM™ TRANSPARENT FILM DRESSING FRAME STYLE, 1626W, 4 IN X 4-3/4 IN (10 CM X 12 CM), 50/CT 4CT/CASE: Brand: 3M™ TEGADERM™

## (undated) DEVICE — SOLUTION IV STRL H2O 500 ML AQUALITE POUR BTL

## (undated) DEVICE — COVER,TABLE,HEAVY DUTY,77"X90",STRL: Brand: MEDLINE

## (undated) DEVICE — EYE PADSSTERILENOT MADE WITH NATURAL RUBBER LATEXSINGLE USE ONLYDO NOT USE IF PACKAGE OPENED OR DAMAGED: Brand: CARDINAL HEALTH

## (undated) DEVICE — STRAINER URIN CALC RNL MSH -- CONVERT TO ITEM 357634

## (undated) DEVICE — (D)SYR 10ML 1/5ML GRAD NSAF -- PKGING CHANGE USE ITEM 338027

## (undated) DEVICE — SOLUTION IRRIG 1000ML 0.9% SOD CHL USP POUR PLAS BTL

## (undated) DEVICE — GOWN,NON-REINFORCED,XXL: Brand: MEDLINE

## (undated) DEVICE — K-WIRE

## (undated) DEVICE — KIT POS FOAM HANA TBL

## (undated) DEVICE — FLEXIBLE IRIS RETRACTORS, 6 UNITS AT 5 HOOKS: Brand: ALCON GRIESHABER

## (undated) DEVICE — TOWEL 4 PLY TISS 19X30 SUE WHT

## (undated) DEVICE — SUTURE VICRYL SZ 0 L36IN ABSRB UD L36MM CT-1 1/2 CIR J946H

## (undated) DEVICE — SURGICAL PROCEDURE PACK CATRCT CUST

## (undated) DEVICE — NEEDLE HYPO 18GA L1.5IN PNK S STL HUB POLYPR SHLD REG BVL

## (undated) DEVICE — STERILE POLYISOPRENE POWDER-FREE SURGICAL GLOVES: Brand: PROTEXIS

## (undated) DEVICE — 3M™ TEGADERM™ TRANSPARENT FILM DRESSING FRAME STYLE, 1624W, 2-3/8 IN X 2-3/4 IN (6 CM X 7 CM), 100/CT 4CT/CASE: Brand: 3M™ TEGADERM™

## (undated) DEVICE — GLOVE ORTHO 8   MSG9480

## (undated) DEVICE — NDL FLTR TIP 5 MIC 18GX1.5IN --

## (undated) DEVICE — SYR 5ML 1/5 GRAD LL NSAF LF --

## (undated) DEVICE — BASIN EMSIS 16OZ GRAPHITE PLAS KID SHP MOLD GRAD FOR ORAL

## (undated) DEVICE — THE MONARCH® "D" CARTRIDGE IS A SINGLE-USE POLYPROPYLENE CARTRIDGE FOR POSTERIOR CHAMBER IOL DELIVERY: Brand: MONARCH® III

## (undated) DEVICE — SYRINGE INSULIN 1ML LUERSLIP TIP W/O SFTY U100 BLISTER PK

## (undated) DEVICE — SOLUTION IV 250ML 0.9% SOD CHL CLR INJ FLX BG CONT PRT CLSR

## (undated) DEVICE — DRESSING PETRO GZ 7.2X0.5 IN WND IMPREG OVERWRAP CURAD DISP

## (undated) DEVICE — 1200 GUARD II KIT W/5MM TUBE W/O VAC TUBE: Brand: GUARDIAN

## (undated) DEVICE — KENDALL DL ECG CABLE AND LEAD WIRE SYSTEM, 3-LEAD, SINGLE PATIENT USE: Brand: KENDALL

## (undated) DEVICE — TRAP SUC MUCOUS 70ML -- MEDICHOICE MEDLINE

## (undated) DEVICE — SOLUTION IRRIG 1000ML STRL H2O USP PLAS POUR BTL

## (undated) DEVICE — SNARE ENDOSCP M L240CM W27MM SHTH DIA2.4MM CHN 2.8MM OVL

## (undated) DEVICE — LOCKING DRILL

## (undated) DEVICE — NEONATAL-ADULT SPO2 SENSOR: Brand: NELLCOR

## (undated) DEVICE — Z DISCONTINUED PER MEDLINE LINE GAS SAMPLING O2/CO2 LNG AD 13 FT NSL W/ TBNG FILTERLINE

## (undated) DEVICE — CATH IV AUTOGRD BC BLU 22GA 25 -- INSYTE

## (undated) DEVICE — SYRINGE 50ML E/T

## (undated) DEVICE — KENDALL RADIOLUCENT FOAM MONITORING ELECTRODE RECTANGULAR SHAPE: Brand: KENDALL

## (undated) DEVICE — SUTURE ETHILON SZ 2-0 L30IN NONABSORBABLE BLK L36MM PSLX 3/8 1697H

## (undated) DEVICE — SYR 10ML LUER LOK 1/5ML GRAD --

## (undated) DEVICE — SPONGE GZ W4XL4IN COT 12 PLY TYP VII WVN C FLD DSGN STERILE

## (undated) DEVICE — GLOVE SURG SZ 85 L12IN FNGR THK79MIL GRN LTX FREE

## (undated) DEVICE — CUFF ADULT 1 PC 1 VINYL DISP --

## (undated) DEVICE — SET ADMIN 16ML TBNG L100IN 2 Y INJ SITE IV PIGGY BK DISP

## (undated) DEVICE — 6619 2 PTNT ISO SYS INCISE AREA&LT;(&GT;&&LT;)&GT;P: Brand: STERI-DRAPE™ IOBAN™ 2

## (undated) DEVICE — CONTAINER SPEC 20 ML LID NEUT BUFF FORMALIN 10 % POLYPR STS

## (undated) DEVICE — PRECISION PIN TAPERED: Brand: GAMMA

## (undated) DEVICE — SUTURE MONOCRYL SZ 2-0 L36IN ABSRB UD L36MM CT-1 1/2 CIR Y945H

## (undated) DEVICE — SOLIDIFIER MEDC 1200ML -- CONVERT TO 356117